# Patient Record
Sex: FEMALE | Race: WHITE | URBAN - METROPOLITAN AREA
[De-identification: names, ages, dates, MRNs, and addresses within clinical notes are randomized per-mention and may not be internally consistent; named-entity substitution may affect disease eponyms.]

---

## 2023-07-12 ENCOUNTER — INPATIENT (INPATIENT)
Facility: HOSPITAL | Age: 73
LOS: 7 days | End: 2023-07-20
Attending: INTERNAL MEDICINE | Admitting: INTERNAL MEDICINE
Payer: MEDICARE

## 2023-07-12 VITALS
SYSTOLIC BLOOD PRESSURE: 129 MMHG | RESPIRATION RATE: 22 BRPM | HEART RATE: 115 BPM | TEMPERATURE: 101 F | DIASTOLIC BLOOD PRESSURE: 60 MMHG | OXYGEN SATURATION: 97 %

## 2023-07-12 DIAGNOSIS — J96.01 ACUTE RESPIRATORY FAILURE WITH HYPOXIA: ICD-10-CM

## 2023-07-12 DIAGNOSIS — Z95.2 PRESENCE OF PROSTHETIC HEART VALVE: ICD-10-CM

## 2023-07-12 DIAGNOSIS — I10 ESSENTIAL (PRIMARY) HYPERTENSION: ICD-10-CM

## 2023-07-12 DIAGNOSIS — I50.20 UNSPECIFIED SYSTOLIC (CONGESTIVE) HEART FAILURE: ICD-10-CM

## 2023-07-12 DIAGNOSIS — J18.9 PNEUMONIA, UNSPECIFIED ORGANISM: ICD-10-CM

## 2023-07-12 DIAGNOSIS — G93.41 METABOLIC ENCEPHALOPATHY: ICD-10-CM

## 2023-07-12 DIAGNOSIS — E78.5 HYPERLIPIDEMIA, UNSPECIFIED: ICD-10-CM

## 2023-07-12 DIAGNOSIS — C34.90 MALIGNANT NEOPLASM OF UNSPECIFIED PART OF UNSPECIFIED BRONCHUS OR LUNG: ICD-10-CM

## 2023-07-12 DIAGNOSIS — Z29.9 ENCOUNTER FOR PROPHYLACTIC MEASURES, UNSPECIFIED: ICD-10-CM

## 2023-07-12 DIAGNOSIS — E11.9 TYPE 2 DIABETES MELLITUS WITHOUT COMPLICATIONS: ICD-10-CM

## 2023-07-12 DIAGNOSIS — I50.43 ACUTE ON CHRONIC COMBINED SYSTOLIC (CONGESTIVE) AND DIASTOLIC (CONGESTIVE) HEART FAILURE: ICD-10-CM

## 2023-07-12 DIAGNOSIS — A41.9 SEPSIS, UNSPECIFIED ORGANISM: ICD-10-CM

## 2023-07-12 LAB
ALBUMIN SERPL ELPH-MCNC: 2.8 G/DL — LOW (ref 3.3–5)
ALP SERPL-CCNC: 397 U/L — HIGH (ref 40–120)
ALT FLD-CCNC: 24 U/L — SIGNIFICANT CHANGE UP (ref 4–33)
ANION GAP SERPL CALC-SCNC: 17 MMOL/L — HIGH (ref 7–14)
APPEARANCE UR: ABNORMAL
AST SERPL-CCNC: 62 U/L — HIGH (ref 4–32)
B PERT DNA SPEC QL NAA+PROBE: SIGNIFICANT CHANGE UP
B PERT+PARAPERT DNA PNL SPEC NAA+PROBE: SIGNIFICANT CHANGE UP
BACTERIA # UR AUTO: NEGATIVE /HPF — SIGNIFICANT CHANGE UP
BASE EXCESS BLDV CALC-SCNC: -1.6 MMOL/L — SIGNIFICANT CHANGE UP (ref -2–3)
BASE EXCESS BLDV CALC-SCNC: -2.2 MMOL/L — LOW (ref -2–3)
BASE EXCESS BLDV CALC-SCNC: 0.6 MMOL/L — SIGNIFICANT CHANGE UP (ref -2–3)
BASOPHILS # BLD AUTO: 0.01 K/UL — SIGNIFICANT CHANGE UP (ref 0–0.2)
BASOPHILS NFR BLD AUTO: 0 % — SIGNIFICANT CHANGE UP (ref 0–2)
BILIRUB SERPL-MCNC: 1.4 MG/DL — HIGH (ref 0.2–1.2)
BILIRUB UR-MCNC: NEGATIVE — SIGNIFICANT CHANGE UP
BLOOD GAS VENOUS COMPREHENSIVE RESULT: SIGNIFICANT CHANGE UP
BORDETELLA PARAPERTUSSIS (RAPRVP): SIGNIFICANT CHANGE UP
BUN SERPL-MCNC: 26 MG/DL — HIGH (ref 7–23)
C PNEUM DNA SPEC QL NAA+PROBE: SIGNIFICANT CHANGE UP
CALCIUM SERPL-MCNC: 11.9 MG/DL — HIGH (ref 8.4–10.5)
CAST: 0 /LPF — SIGNIFICANT CHANGE UP (ref 0–4)
CHLORIDE BLDV-SCNC: 106 MMOL/L — SIGNIFICANT CHANGE UP (ref 96–108)
CHLORIDE BLDV-SCNC: 108 MMOL/L — SIGNIFICANT CHANGE UP (ref 96–108)
CHLORIDE BLDV-SCNC: 111 MMOL/L — HIGH (ref 96–108)
CHLORIDE SERPL-SCNC: 105 MMOL/L — SIGNIFICANT CHANGE UP (ref 98–107)
CK MB CFR SERPL CALC: 2.1 NG/ML — SIGNIFICANT CHANGE UP
CO2 BLDV-SCNC: 23.4 MMOL/L — SIGNIFICANT CHANGE UP (ref 22–26)
CO2 BLDV-SCNC: 24 MMOL/L — SIGNIFICANT CHANGE UP (ref 22–26)
CO2 BLDV-SCNC: 25.2 MMOL/L — SIGNIFICANT CHANGE UP (ref 22–26)
CO2 SERPL-SCNC: 22 MMOL/L — SIGNIFICANT CHANGE UP (ref 22–31)
COLOR SPEC: YELLOW — SIGNIFICANT CHANGE UP
CREAT SERPL-MCNC: 0.94 MG/DL — SIGNIFICANT CHANGE UP (ref 0.5–1.3)
DIFF PNL FLD: ABNORMAL
EGFR: 64 ML/MIN/1.73M2 — SIGNIFICANT CHANGE UP
EOSINOPHIL # BLD AUTO: 0 K/UL — SIGNIFICANT CHANGE UP (ref 0–0.5)
EOSINOPHIL NFR BLD AUTO: 0 % — SIGNIFICANT CHANGE UP (ref 0–6)
FLUAV SUBTYP SPEC NAA+PROBE: SIGNIFICANT CHANGE UP
FLUBV RNA SPEC QL NAA+PROBE: SIGNIFICANT CHANGE UP
GAS PNL BLDV: 139 MMOL/L — SIGNIFICANT CHANGE UP (ref 136–145)
GAS PNL BLDV: 140 MMOL/L — SIGNIFICANT CHANGE UP (ref 136–145)
GAS PNL BLDV: 141 MMOL/L — SIGNIFICANT CHANGE UP (ref 136–145)
GAS PNL BLDV: SIGNIFICANT CHANGE UP
GAS PNL BLDV: SIGNIFICANT CHANGE UP
GLUCOSE BLDV-MCNC: 154 MG/DL — HIGH (ref 70–99)
GLUCOSE BLDV-MCNC: 157 MG/DL — HIGH (ref 70–99)
GLUCOSE BLDV-MCNC: 158 MG/DL — HIGH (ref 70–99)
GLUCOSE SERPL-MCNC: 165 MG/DL — HIGH (ref 70–99)
GLUCOSE UR QL: NEGATIVE MG/DL — SIGNIFICANT CHANGE UP
HADV DNA SPEC QL NAA+PROBE: SIGNIFICANT CHANGE UP
HCO3 BLDV-SCNC: 22 MMOL/L — SIGNIFICANT CHANGE UP (ref 22–29)
HCO3 BLDV-SCNC: 23 MMOL/L — SIGNIFICANT CHANGE UP (ref 22–29)
HCO3 BLDV-SCNC: 24 MMOL/L — SIGNIFICANT CHANGE UP (ref 22–29)
HCOV 229E RNA SPEC QL NAA+PROBE: SIGNIFICANT CHANGE UP
HCOV HKU1 RNA SPEC QL NAA+PROBE: SIGNIFICANT CHANGE UP
HCOV NL63 RNA SPEC QL NAA+PROBE: SIGNIFICANT CHANGE UP
HCOV OC43 RNA SPEC QL NAA+PROBE: SIGNIFICANT CHANGE UP
HCT VFR BLD CALC: 29.6 % — LOW (ref 34.5–45)
HCT VFR BLDA CALC: 28 % — LOW (ref 34.5–46.5)
HCT VFR BLDA CALC: 30 % — LOW (ref 34.5–46.5)
HCT VFR BLDA CALC: 31 % — LOW (ref 34.5–46.5)
HGB BLD CALC-MCNC: 10.2 G/DL — LOW (ref 11.7–16.1)
HGB BLD CALC-MCNC: 9.2 G/DL — LOW (ref 11.7–16.1)
HGB BLD CALC-MCNC: 9.9 G/DL — LOW (ref 11.7–16.1)
HGB BLD-MCNC: 9.4 G/DL — LOW (ref 11.5–15.5)
HMPV RNA SPEC QL NAA+PROBE: SIGNIFICANT CHANGE UP
HPIV1 RNA SPEC QL NAA+PROBE: SIGNIFICANT CHANGE UP
HPIV2 RNA SPEC QL NAA+PROBE: SIGNIFICANT CHANGE UP
HPIV3 RNA SPEC QL NAA+PROBE: SIGNIFICANT CHANGE UP
HPIV4 RNA SPEC QL NAA+PROBE: SIGNIFICANT CHANGE UP
IANC: 17.87 K/UL — HIGH (ref 1.8–7.4)
IMM GRANULOCYTES NFR BLD AUTO: 1.1 % — HIGH (ref 0–0.9)
KETONES UR-MCNC: NEGATIVE MG/DL — SIGNIFICANT CHANGE UP
LACTATE BLDV-MCNC: 2.8 MMOL/L — HIGH (ref 0.5–2)
LACTATE BLDV-MCNC: 3.6 MMOL/L — HIGH (ref 0.5–2)
LACTATE BLDV-MCNC: 4.2 MMOL/L — CRITICAL HIGH (ref 0.5–2)
LEUKOCYTE ESTERASE UR-ACNC: NEGATIVE — SIGNIFICANT CHANGE UP
LYMPHOCYTES # BLD AUTO: 0.77 K/UL — LOW (ref 1–3.3)
LYMPHOCYTES # BLD AUTO: 3.8 % — LOW (ref 13–44)
M PNEUMO DNA SPEC QL NAA+PROBE: SIGNIFICANT CHANGE UP
MCHC RBC-ENTMCNC: 25.5 PG — LOW (ref 27–34)
MCHC RBC-ENTMCNC: 31.8 GM/DL — LOW (ref 32–36)
MCV RBC AUTO: 80.2 FL — SIGNIFICANT CHANGE UP (ref 80–100)
MONOCYTES # BLD AUTO: 1.18 K/UL — HIGH (ref 0–0.9)
MONOCYTES NFR BLD AUTO: 5.9 % — SIGNIFICANT CHANGE UP (ref 2–14)
NEUTROPHILS # BLD AUTO: 17.87 K/UL — HIGH (ref 1.8–7.4)
NEUTROPHILS NFR BLD AUTO: 89.2 % — HIGH (ref 43–77)
NITRITE UR-MCNC: NEGATIVE — SIGNIFICANT CHANGE UP
NRBC # BLD: 0 /100 WBCS — SIGNIFICANT CHANGE UP (ref 0–0)
NRBC # FLD: 0 K/UL — SIGNIFICANT CHANGE UP (ref 0–0)
PCO2 BLDV: 34 MMHG — LOW (ref 39–52)
PCO2 BLDV: 36 MMHG — LOW (ref 39–52)
PCO2 BLDV: 37 MMHG — LOW (ref 39–52)
PH BLDV: 7.4 — SIGNIFICANT CHANGE UP (ref 7.32–7.43)
PH BLDV: 7.4 — SIGNIFICANT CHANGE UP (ref 7.32–7.43)
PH BLDV: 7.46 — HIGH (ref 7.32–7.43)
PH UR: 5.5 — SIGNIFICANT CHANGE UP (ref 5–8)
PLATELET # BLD AUTO: 377 K/UL — SIGNIFICANT CHANGE UP (ref 150–400)
PO2 BLDV: 68 MMHG — HIGH (ref 25–45)
PO2 BLDV: 78 MMHG — HIGH (ref 25–45)
PO2 BLDV: 80 MMHG — HIGH (ref 25–45)
POTASSIUM BLDV-SCNC: 2.6 MMOL/L — CRITICAL LOW (ref 3.5–5.1)
POTASSIUM BLDV-SCNC: 2.9 MMOL/L — CRITICAL LOW (ref 3.5–5.1)
POTASSIUM BLDV-SCNC: 3.1 MMOL/L — LOW (ref 3.5–5.1)
POTASSIUM SERPL-MCNC: 3.1 MMOL/L — LOW (ref 3.5–5.3)
POTASSIUM SERPL-SCNC: 3.1 MMOL/L — LOW (ref 3.5–5.3)
PROT SERPL-MCNC: 5.8 G/DL — LOW (ref 6–8.3)
PROT UR-MCNC: 30 MG/DL
RAPID RVP RESULT: SIGNIFICANT CHANGE UP
RBC # BLD: 3.69 M/UL — LOW (ref 3.8–5.2)
RBC # FLD: 15.8 % — HIGH (ref 10.3–14.5)
RBC CASTS # UR COMP ASSIST: 2 /HPF — SIGNIFICANT CHANGE UP (ref 0–4)
RSV RNA SPEC QL NAA+PROBE: SIGNIFICANT CHANGE UP
RV+EV RNA SPEC QL NAA+PROBE: SIGNIFICANT CHANGE UP
SAO2 % BLDV: 94 % — HIGH (ref 67–88)
SAO2 % BLDV: 97.1 % — HIGH (ref 67–88)
SAO2 % BLDV: 97.3 % — HIGH (ref 67–88)
SARS-COV-2 RNA SPEC QL NAA+PROBE: SIGNIFICANT CHANGE UP
SODIUM SERPL-SCNC: 144 MMOL/L — SIGNIFICANT CHANGE UP (ref 135–145)
SP GR SPEC: 1.03 — SIGNIFICANT CHANGE UP (ref 1–1.03)
SQUAMOUS # UR AUTO: 7 /HPF — HIGH (ref 0–5)
TROPONIN T, HIGH SENSITIVITY RESULT: 83 NG/L — CRITICAL HIGH
TROPONIN T, HIGH SENSITIVITY RESULT: 89 NG/L — CRITICAL HIGH
UROBILINOGEN FLD QL: 0.2 MG/DL — SIGNIFICANT CHANGE UP (ref 0.2–1)
WBC # BLD: 20.05 K/UL — HIGH (ref 3.8–10.5)
WBC # FLD AUTO: 20.05 K/UL — HIGH (ref 3.8–10.5)
WBC UR QL: 11 /HPF — HIGH (ref 0–5)

## 2023-07-12 PROCEDURE — 71045 X-RAY EXAM CHEST 1 VIEW: CPT | Mod: 26

## 2023-07-12 PROCEDURE — 74177 CT ABD & PELVIS W/CONTRAST: CPT | Mod: 26,MA

## 2023-07-12 PROCEDURE — 71275 CT ANGIOGRAPHY CHEST: CPT | Mod: 26,MA

## 2023-07-12 PROCEDURE — 99285 EMERGENCY DEPT VISIT HI MDM: CPT

## 2023-07-12 RX ORDER — DEXTROSE 50 % IN WATER 50 %
15 SYRINGE (ML) INTRAVENOUS ONCE
Refills: 0 | Status: DISCONTINUED | OUTPATIENT
Start: 2023-07-12 | End: 2023-07-14

## 2023-07-12 RX ORDER — ENOXAPARIN SODIUM 100 MG/ML
40 INJECTION SUBCUTANEOUS EVERY 24 HOURS
Refills: 0 | Status: DISCONTINUED | OUTPATIENT
Start: 2023-07-12 | End: 2023-07-12

## 2023-07-12 RX ORDER — METOPROLOL TARTRATE 50 MG
100 TABLET ORAL DAILY
Refills: 0 | Status: DISCONTINUED | OUTPATIENT
Start: 2023-07-12 | End: 2023-07-14

## 2023-07-12 RX ORDER — POLYETHYLENE GLYCOL 3350 17 G/17G
17 POWDER, FOR SOLUTION ORAL DAILY
Refills: 0 | Status: DISCONTINUED | OUTPATIENT
Start: 2023-07-12 | End: 2023-07-20

## 2023-07-12 RX ORDER — PIPERACILLIN AND TAZOBACTAM 4; .5 G/20ML; G/20ML
3.38 INJECTION, POWDER, LYOPHILIZED, FOR SOLUTION INTRAVENOUS ONCE
Refills: 0 | Status: COMPLETED | OUTPATIENT
Start: 2023-07-12 | End: 2023-07-12

## 2023-07-12 RX ORDER — SODIUM CHLORIDE 9 MG/ML
1000 INJECTION, SOLUTION INTRAVENOUS
Refills: 0 | Status: DISCONTINUED | OUTPATIENT
Start: 2023-07-12 | End: 2023-07-14

## 2023-07-12 RX ORDER — SODIUM CHLORIDE 9 MG/ML
500 INJECTION, SOLUTION INTRAVENOUS ONCE
Refills: 0 | Status: COMPLETED | OUTPATIENT
Start: 2023-07-12 | End: 2023-07-12

## 2023-07-12 RX ORDER — DAPAGLIFLOZIN 10 MG/1
10 TABLET, FILM COATED ORAL EVERY 24 HOURS
Refills: 0 | Status: DISCONTINUED | OUTPATIENT
Start: 2023-07-12 | End: 2023-07-13

## 2023-07-12 RX ORDER — SODIUM CHLORIDE 9 MG/ML
1000 INJECTION INTRAMUSCULAR; INTRAVENOUS; SUBCUTANEOUS ONCE
Refills: 0 | Status: COMPLETED | OUTPATIENT
Start: 2023-07-12 | End: 2023-07-12

## 2023-07-12 RX ORDER — LEVOTHYROXINE SODIUM 125 MCG
25 TABLET ORAL DAILY
Refills: 0 | Status: DISCONTINUED | OUTPATIENT
Start: 2023-07-12 | End: 2023-07-14

## 2023-07-12 RX ORDER — DEXTROSE 50 % IN WATER 50 %
25 SYRINGE (ML) INTRAVENOUS ONCE
Refills: 0 | Status: DISCONTINUED | OUTPATIENT
Start: 2023-07-12 | End: 2023-07-14

## 2023-07-12 RX ORDER — SODIUM CHLORIDE 9 MG/ML
4 INJECTION INTRAMUSCULAR; INTRAVENOUS; SUBCUTANEOUS EVERY 12 HOURS
Refills: 0 | Status: DISCONTINUED | OUTPATIENT
Start: 2023-07-12 | End: 2023-07-13

## 2023-07-12 RX ORDER — SACUBITRIL AND VALSARTAN 24; 26 MG/1; MG/1
1 TABLET, FILM COATED ORAL
Refills: 0 | Status: DISCONTINUED | OUTPATIENT
Start: 2023-07-12 | End: 2023-07-14

## 2023-07-12 RX ORDER — INSULIN LISPRO 100/ML
VIAL (ML) SUBCUTANEOUS
Refills: 0 | Status: DISCONTINUED | OUTPATIENT
Start: 2023-07-12 | End: 2023-07-14

## 2023-07-12 RX ORDER — POTASSIUM CHLORIDE 20 MEQ
10 PACKET (EA) ORAL
Refills: 0 | Status: COMPLETED | OUTPATIENT
Start: 2023-07-12 | End: 2023-07-12

## 2023-07-12 RX ORDER — IPRATROPIUM/ALBUTEROL SULFATE 18-103MCG
3 AEROSOL WITH ADAPTER (GRAM) INHALATION EVERY 6 HOURS
Refills: 0 | Status: DISCONTINUED | OUTPATIENT
Start: 2023-07-12 | End: 2023-07-20

## 2023-07-12 RX ORDER — PIPERACILLIN AND TAZOBACTAM 4; .5 G/20ML; G/20ML
3.38 INJECTION, POWDER, LYOPHILIZED, FOR SOLUTION INTRAVENOUS EVERY 8 HOURS
Refills: 0 | Status: COMPLETED | OUTPATIENT
Start: 2023-07-12 | End: 2023-07-19

## 2023-07-12 RX ORDER — GLUCAGON INJECTION, SOLUTION 0.5 MG/.1ML
1 INJECTION, SOLUTION SUBCUTANEOUS ONCE
Refills: 0 | Status: DISCONTINUED | OUTPATIENT
Start: 2023-07-12 | End: 2023-07-14

## 2023-07-12 RX ORDER — POTASSIUM CHLORIDE 20 MEQ
40 PACKET (EA) ORAL EVERY 4 HOURS
Refills: 0 | Status: COMPLETED | OUTPATIENT
Start: 2023-07-12 | End: 2023-07-12

## 2023-07-12 RX ORDER — PANTOPRAZOLE SODIUM 20 MG/1
40 TABLET, DELAYED RELEASE ORAL
Refills: 0 | Status: DISCONTINUED | OUTPATIENT
Start: 2023-07-12 | End: 2023-07-14

## 2023-07-12 RX ORDER — ACETAMINOPHEN 500 MG
1000 TABLET ORAL ONCE
Refills: 0 | Status: COMPLETED | OUTPATIENT
Start: 2023-07-12 | End: 2023-07-12

## 2023-07-12 RX ORDER — VANCOMYCIN HCL 1 G
1000 VIAL (EA) INTRAVENOUS ONCE
Refills: 0 | Status: COMPLETED | OUTPATIENT
Start: 2023-07-12 | End: 2023-07-12

## 2023-07-12 RX ORDER — ATORVASTATIN CALCIUM 80 MG/1
80 TABLET, FILM COATED ORAL AT BEDTIME
Refills: 0 | Status: DISCONTINUED | OUTPATIENT
Start: 2023-07-12 | End: 2023-07-14

## 2023-07-12 RX ORDER — ACETAMINOPHEN 500 MG
650 TABLET ORAL EVERY 6 HOURS
Refills: 0 | Status: DISCONTINUED | OUTPATIENT
Start: 2023-07-12 | End: 2023-07-20

## 2023-07-12 RX ORDER — DEXTROSE 50 % IN WATER 50 %
12.5 SYRINGE (ML) INTRAVENOUS ONCE
Refills: 0 | Status: DISCONTINUED | OUTPATIENT
Start: 2023-07-12 | End: 2023-07-14

## 2023-07-12 RX ORDER — INSULIN LISPRO 100/ML
VIAL (ML) SUBCUTANEOUS AT BEDTIME
Refills: 0 | Status: DISCONTINUED | OUTPATIENT
Start: 2023-07-13 | End: 2023-07-14

## 2023-07-12 RX ADMIN — Medication 250 MILLIGRAM(S): at 19:40

## 2023-07-12 RX ADMIN — SODIUM CHLORIDE 500 MILLILITER(S): 9 INJECTION, SOLUTION INTRAVENOUS at 20:50

## 2023-07-12 RX ADMIN — PIPERACILLIN AND TAZOBACTAM 200 GRAM(S): 4; .5 INJECTION, POWDER, LYOPHILIZED, FOR SOLUTION INTRAVENOUS at 17:02

## 2023-07-12 RX ADMIN — SODIUM CHLORIDE 1000 MILLILITER(S): 9 INJECTION INTRAMUSCULAR; INTRAVENOUS; SUBCUTANEOUS at 15:58

## 2023-07-12 RX ADMIN — Medication 400 MILLIGRAM(S): at 17:01

## 2023-07-12 RX ADMIN — Medication 40 MILLIEQUIVALENT(S): at 20:43

## 2023-07-12 RX ADMIN — Medication 100 MILLIEQUIVALENT(S): at 20:45

## 2023-07-12 RX ADMIN — SODIUM CHLORIDE 1000 MILLILITER(S): 9 INJECTION INTRAMUSCULAR; INTRAVENOUS; SUBCUTANEOUS at 17:01

## 2023-07-12 RX ADMIN — Medication 100 MILLIEQUIVALENT(S): at 23:05

## 2023-07-12 RX ADMIN — PIPERACILLIN AND TAZOBACTAM 3.38 GRAM(S): 4; .5 INJECTION, POWDER, LYOPHILIZED, FOR SOLUTION INTRAVENOUS at 17:00

## 2023-07-12 RX ADMIN — Medication 40 MILLIEQUIVALENT(S): at 23:52

## 2023-07-12 RX ADMIN — Medication 1000 MILLIGRAM(S): at 17:01

## 2023-07-12 NOTE — H&P ADULT - HISTORY OF PRESENT ILLNESS
Patient is a 73 year old F with R sided lung cancer with metastasis to lung and liver, T2DM, COPD, HTN, hypothyroidism, HLD, and depression who presents from [place] for hypoxia and respiratory distress. Per NH staff, the patient began having sudden onset respiratory distress around 11 AM prior to arrival and was found to be hypoxic to the 80s at the time. The patient was placed on nasal cannula by EMS with improvement. The patient endorses shortness of breath,    In the ED, vitals notable for , TMax 38.4C, /60, SpO2 97% on 6L NC. Labs remarkable for WBC 20, hgb 9.4, hsTropT 89 --> 83, K 3.1 and mild LFT elevation. CT C/A/P without PE, but revealing large right hilar mass obstructing the right mainstem bronchus, along with bilateral pulmonary, judy and hepatic metastases. The patient received 2.5L IVF, vanc/zosyn, and IV tylenol. Patient is a 73 year old F with R sided lung cancer with metastasis to lung and liver, mechanical aortic valve on warfarin, ?HFrEF, T2DM, COPD, HTN, hypothyroidism, HLD, and depression who presents from [place] for hypoxia and respiratory distress. Per NH staff, the patient began having sudden onset respiratory distress around 11 AM prior to arrival and was found to be hypoxic to the 80s at the time. The patient was placed on nasal cannula by EMS with improvement. The patient cannot participate in the history given her altered mental status.    In the ED, vitals notable for , TMax 38.4C, /60, SpO2 97% on 6L NC. Labs remarkable for WBC 20, hgb 9.4, hsTropT 89 --> 83, K 3.1 and mild LFT elevation. CT C/A/P without PE, but revealing large right hilar mass obstructing the right mainstem bronchus, along with bilateral pulmonary, judy and hepatic metastases. The patient received 2.5L IVF, vanc/zosyn, and IV tylenol.

## 2023-07-12 NOTE — ED ADULT TRIAGE NOTE - CHIEF COMPLAINT QUOTE
Pt reporting to the ED [facility] as a notification for AMS and respiratory distress. Pt brought directly back to [facility]

## 2023-07-12 NOTE — H&P ADULT - PROBLEM SELECTOR PLAN 10
DVT PPx: home warfarin  Diet:   Code:   Dispo:   Communication:    Discharge information:  Pharmacy:  PCP: EKG with sinus tach + PVCs, prolonged QTc 524  - QTc falsely prolonged in the setting of sinus tachycardia  - recheck when HR returns to normal rate

## 2023-07-12 NOTE — H&P ADULT - PROBLEM SELECTOR PLAN 8
Patient on eztemibe and rosuvastatin at home    Plan:  > continue with atorvastatin while admitted Patient on Ezetimibe and rosuvastatin at home    Plan:  > continue with atorvastatin while admitted

## 2023-07-12 NOTE — ED PROVIDER NOTE - ATTENDING CONTRIBUTION TO CARE
73-year-old female past medical history of lung cancer with right-sided lung mass and metastases to liver, bilateral pneumonia, type 2 diabetes, hypertension, hyperlipidemia, hypothyroidism, COPD, depression brought in by EMS after [facility] with acute onset respiratory distress and lethargy.  Around 11 AM today patient was noted to have respiratory distress O2 sats at 80%, placed on nasal cannula at that time.  Patient is hot to touch, following commands, moving all extremities, decreased breath sounds on the right with visible soft tissue mass noted over right clavicle.  Heart tachycardic with regular rhythm, abdomen with tenderness to palpation.  Sepsis labs, CTA chest, CT abdomen, RVP, TBA.  Patient was treated with fluids will also treat with broad-spectrum antibiotics. Differential includes sepsis 2/2 pneumonia, obstruction secondary to mass, PE, pneumothorax, versus other infectious etiology.  Bedside ultrasound reveals lung sliding on the right chest, chest x-ray does not reveal pneumothorax.

## 2023-07-12 NOTE — ED ADULT NURSE NOTE - NSFALLUNIVINTERV_ED_ALL_ED
Bed/Stretcher in lowest position, wheels locked, appropriate side rails in place/Call bell, personal items and telephone in reach/Instruct patient to call for assistance before getting out of bed/chair/stretcher/Non-slip footwear applied when patient is off stretcher/[place] to call system/Physically safe environment - no spills, clutter or unnecessary equipment/Purposeful proactive rounding/Room/bathroom lighting operational, light cord in reach

## 2023-07-12 NOTE — H&P ADULT - PROBLEM SELECTOR PLAN 1
Patient with tachycardia, tachypnea, leukocytosis in the setting of likely post obstructive PNA  - s/p vanc and zosyn in the ED  - s/p 2.5 L IVF   - patient with recent admission at [facility] for AHRF due to post obstructive PNA, tx with IV levaquin  - likely obstructive PNA from large R hilar mass obstructing the mainstem bronchus  - per review of [facility] records, patient underwent bronchoscopy with biopsy and endobronchial stent placement in 6/2023    Plan:  > continue with IV zosyn, anticipate 7 day course pending intervention (7/12 - )  > follow up UA, UCx, blood cx, urine legionella Patient with tachycardia, tachypnea, leukocytosis in the setting of likely post obstructive PNA  - s/p vanc and zosyn in the ED  - s/p 2.5 L IVF   - patient with recent admission at [facility] for AHRF due to post obstructive PNA, tx with IV levaquin  - likely obstructive PNA from large R hilar mass obstructing the mainstem bronchus  - per review of [facility] records, patient underwent bronchoscopy with biopsy and endobronchial stent placement in 6/2023    Plan:  > continue with IV zosyn, anticipate 7 day course pending intervention (7/12 - )  > ID consult in AM  > follow up UA, UCx, blood cx, urine legionella, MRSA PCR Patient with tachycardia, tachypnea, leukocytosis, fever, in the setting of likely post obstructive PNA  - s/p vanc and zosyn in the ED  - s/p 2.5 L IVF   - patient with recent admission at [facility] for AHRF due to post obstructive PNA, tx with IV levaquin  - likely obstructive PNA from large R hilar mass obstructing the mainstem bronchus  - per review of [facility] records, patient underwent bronchoscopy with biopsy and endobronchial stent placement in 6/2023    Plan:  > continue with IV zosyn, anticipate 7 day course pending intervention (7/12 - )  > ID consult in AM  > follow up UA, UCx, blood cx, urine legionella, MRSA PCR  > ensure optimal hydration  > Tylenol PRN fever, mild pain

## 2023-07-12 NOTE — H&P ADULT - PROBLEM SELECTOR PLAN 2
Likely in the setting of post-obstructive PNA and worsening lung cancer  - found to be hypxoxic  - now stable on 6L NC, SpO2 94-96%    Plan:  > abx as above  > aggressive pulmonary toilet: chest PT, duoneb, hypersal  > pulm consult in AM; patient may benefit from further stenting or re-evaluation of obstructive lesion Likely in the setting of post-obstructive PNA and worsening lung cancer  - found to be hypxoxic  - now stable on 6L NC, SpO2 94-96%    Plan:  > abx as above  > aggressive pulmonary toilet: chest PT, duoneb, hypersal  > pulm consult in AM; patient may benefit from further stenting Likely in the setting of post-obstructive PNA and worsening lung cancer  - found to be hypxoxic  - now stable on 6L NC, SpO2 94-96%    Plan:  > abx as above  > aggressive pulmonary toilet: chest PT, duo-neb, hypersal  > HOB elevation  > aspiration precautions  > pulm consult in AM; patient may benefit from further stenting

## 2023-07-12 NOTE — H&P ADULT - PROBLEM SELECTOR PLAN 11
DVT PPx: home warfarin  Diet: pending bedside dysphagia  Code: FULL code per last hosp at [facility] 6/2023  Dispo: pending clinical improvement

## 2023-07-12 NOTE — ED PROVIDER NOTE - PHYSICAL EXAMINATION
Jennie Talbot MD  GENERAL: Patient +tired, pale.   HEENT: NC/AT, Moist mucous membranes, EOMI.  LUNGS: increased WOB, tachypneic, decreased breath sound   CARDIAC: RRR, no m/r/g.    ABDOMEN: Soft, +generally tender, ND, No rebound, guarding.   EXT: No edema. No calf tenderness.   MSK: No pain with movement, no deformities.  NEURO: A&Ox3. Moving all extremities.  SKIN: Warm and dry. No rash.  PSYCH: Normal affect.

## 2023-07-12 NOTE — ED ADULT NURSE NOTE - OBJECTIVE STATEMENT
chest pain SARITA RN: pt. received to [facility] as a notification for respiratory distress from [facility]. Pt. A&Ox4 at baseline. As per EMS, pt. was fine this AM until approx. 1100, when they noticed her Oxygen started to drop (80s on RA). Upon arrival, pt. noted to be on 6L supplemental O2 via NC. Pt. lethargic but responsive to painful stimuli. breath sounds noted to be absent on the R side, L side lung sounds present. Trachea noted to not be deviated at this time. No crepitus noted. Skin intact, no signs of skin breakdown noted. Sinus tach noted on bedside cardiac monitor. pt. arrives with a 22g IV in the L FA, removed upon arrival. 18g IV placed in the R Wrist, 20g IV placed in the L Hand. Labs drawn and sent as per orders. pt. cleaned, changed and repositioned, purewick applied. comfort measures provided. safety precautions maintained. report endorsed to Primary RN Chuy.

## 2023-07-12 NOTE — H&P ADULT - NSICDXNOFAMILYHX_GEN_ALL_CORE
Called patient regarding missed f/u CT chest. States she was unaware she had appointment but would like to proceed with rescheduling. Nurse navigator called CS and obtained new appointment for 01/05/20 at 2pm at [facility]. Notified patient of this appointment date, time and location. She v/u and agreeable to plan. She knows to call with questions or concerns.    <-- Click to add NO pertinent Family History

## 2023-07-12 NOTE — ED ADULT NURSE REASSESSMENT NOTE - NS ED NURSE REASSESS COMMENT FT1
Facilitator RN: pt sating 88-92% on pulse oximetry on 6L, pt comfortably appearing, in no apparent distress. Spoke to ACP, ok for patient to remains at 6L, request patient to be transported on continuous pulse ox
Pt oxygen saturation was ranging from 95-96% on 6L nasal cannula and HR ranging from . MD Cruz spoken to at bedside. Ordered that pt able to transfer to floor without continuous pulse ox. Pt transferred upstairs not in any distress or discomfort. Pt safety maintained.
Received report from Day RN, pt is A&O x 2-3. Pt is on 6L of oxygen on nasal cannula. Pt is lying in bed comfortably. Pt started on K runs. pt safety maintained.
Pt resting comfortably in bed, in no acute distress. Pt pending admission bed assignment, will continue to monitor.

## 2023-07-12 NOTE — H&P ADULT - NSHPREVIEWOFSYSTEMS_GEN_ALL_CORE
REVIEW OF SYSTEMS:    CONSTITUTIONAL: +weakness; + lethargy; fevers or chills  EYES/ENT: No visual changes;  No vertigo or throat pain   NECK: No pain or stiffness  RESPIRATORY: No cough, wheezing, hemoptysis; No shortness of breath  CARDIOVASCULAR: No chest pain or palpitations  GASTROINTESTINAL: No abdominal or epigastric pain. No nausea, vomiting, or hematemesis; No diarrhea or constipation. No melena or hematochezia.  GENITOURINARY: No dysuria, frequency or hematuria  NEUROLOGICAL: No numbness or weakness  SKIN: No itching, rashes REVIEW OF SYSTEMS:    CONSTITUTIONAL: +weakness; + lethargy; fevers or chills  EYES/ENT: No visual changes;  No vertigo or throat pain   NECK: No pain or stiffness  RESPIRATORY: +shortness of breath; No cough, wheezing, hemoptysis; No shortness of breath  CARDIOVASCULAR: No chest pain or palpitations  GASTROINTESTINAL: No abdominal or epigastric pain. No nausea, vomiting, or hematemesis; No diarrhea or constipation. No melena or hematochezia.  GENITOURINARY: No dysuria, frequency or hematuria  NEUROLOGICAL: No numbness or weakness  SKIN: No itching, rashes

## 2023-07-12 NOTE — ED PROVIDER NOTE - PROGRESS NOTE DETAILS
pt found to be febrile, abx and tylenol and fluids at this time for sepsis.  trop 80s likely 2/2 cardiac demand

## 2023-07-12 NOTE — H&P ADULT - NSHPSOCIALHISTORY_GEN_ALL_CORE
Lives at [facility] Former smoker, quit 5 months ago. Former smoker, quit 5 months ago, previously 25 pack year

## 2023-07-12 NOTE — H&P ADULT - PROBLEM SELECTOR PLAN 5
Patient without documented history of HFrEF; however on GDMT per outpt medication review  - on farxiga, entresto, vericiguat, metoprolol at home    Plan:  > continue with home meds, kidney function stable  > repeat TTE given unclear baseline and diagnosis Patient without documented history of HFrEF; however on GDMT per Outpt medication review  - on Farxiga, Entresto, vericiguat, metoprolol at home    Plan:  > continue with home meds, kidney function stable  > repeat TTE given unclear baseline and diagnosis

## 2023-07-12 NOTE — H&P ADULT - NSHPLABSRESULTS_GEN_ALL_CORE
LABS:                          9.4    20.05 )-----------( 377      ( 12 Jul 2023 14:30 )             29.6     07-12    144  |  105  |  26<H>  ----------------------------<  165<H>  3.1<L>   |  22  |  0.94    Ca    11.9<H>      12 Jul 2023 14:30    TPro  5.8<L>  /  Alb  2.8<L>  /  TBili  1.4<H>  /  DBili  x   /  AST  62<H>  /  ALT  24  /  AlkPhos  397<H>  07-12      < from: CT Abdomen and Pelvis w/ IV Cont (07.12.23 @ 17:31) >    IMPRESSION:  No pulmonary embolism to the segmental level.    Findings suspicious for metastatic lung cancer. Large right hilar mass   obstructing the right mainstem bronchus, as further described above, with   bilateral pulmonary, judy and hepatic metastases. Indeterminate   bilateral adrenal thickening.    Heterogeneous multinodular thyroid.    < end of copied text >    EKG: LABS:                          9.4    20.05 )-----------( 377      ( 12 Jul 2023 14:30 )             29.6     07-12    144  |  105  |  26<H>  ----------------------------<  165<H>  3.1<L>   |  22  |  0.94    Ca    11.9<H>      12 Jul 2023 14:30    TPro  5.8<L>  /  Alb  2.8<L>  /  TBili  1.4<H>  /  DBili  x   /  AST  62<H>  /  ALT  24  /  AlkPhos  397<H>  07-12      < from: CT Abdomen and Pelvis w/ IV Cont (07.12.23 @ 17:31) >    IMPRESSION:  No pulmonary embolism to the segmental level.    Findings suspicious for metastatic lung cancer. Large right hilar mass   obstructing the right mainstem bronchus, as further described above, with   bilateral pulmonary, judy and hepatic metastases. Indeterminate   bilateral adrenal thickening.    Heterogeneous multinodular thyroid.    < end of copied text >    EKG: sinus tachycardia with PVCs LABS:                          9.4    20.05 )-----------( 377      ( 12 Jul 2023 14:30 )             29.6     07-12    144  |  105  |  26<H>  ----------------------------<  165<H>  3.1<L>   |  22  |  0.94    Ca    11.9<H>      12 Jul 2023 14:30    TPro  5.8<L>  /  Alb  2.8<L>  /  TBili  1.4<H>  /  DBili  x   /  AST  62<H>  /  ALT  24  /  AlkPhos  397<H>  07-12      < from: CT Abdomen and Pelvis w/ IV Cont (07.12.23 @ 17:31) >    IMPRESSION:  No pulmonary embolism to the segmental level.    Findings suspicious for metastatic lung cancer. Large right hilar mass   obstructing the right mainstem bronchus, as further described above, with   bilateral pulmonary, judy and hepatic metastases. Indeterminate   bilateral adrenal thickening.    Heterogeneous multinodular thyroid.    < end of copied text >    EKG: sinus tachycardia () with PVCs, RBBB, QTc 524 LABS:                          9.4    20.05 )-----------( 377      ( 12 Jul 2023 14:30 )             29.6     07-12    144  |  105  |  26<H>  ----------------------------<  165<H>  3.1<L>   |  22  |  0.94    Ca    11.9<H>      12 Jul 2023 14:30    TPro  5.8<L>  /  Alb  2.8<L>  /  TBili  1.4<H>  /  DBili  x   /  AST  62<H>  /  ALT  24  /  AlkPhos  397<H>  07-12      < from: CT Abdomen and Pelvis w/ IV Cont (07.12.23 @ 17:31) >    IMPRESSION:  No pulmonary embolism to the segmental level.    Findings suspicious for metastatic lung cancer. Large right hilar mass   obstructing the right mainstem bronchus, as further described above, with   bilateral pulmonary, judy and hepatic metastases. Indeterminate   bilateral adrenal thickening.    Heterogeneous multinodular thyroid.    < end of copied text >      EKG: sinus tachycardia () with PVCs, RBBB, QTc 524

## 2023-07-12 NOTE — ED PROVIDER NOTE - OBJECTIVE STATEMENT
pt is a 72 yo F with lung cancer (R sided mass) with mets to liver, bilat pna, DM2, COPD, hypothyroidism, HTN, HLD, depression who presents to the ED for resp distress. Patient is presenting from [facility].  Per staff there, patient was doing well until about 11 AM to noon when she started having respiratory distress.  Her oxygenation saturation dropped down to 80s.  EMS placed her on nasal cannula.  Patient endorses shortness of breath and feeling warm but denies other symptoms.  At baseline, patient is fully alert and oriented.  At this time, patient is lethargic but still answering questions appropriately

## 2023-07-12 NOTE — H&P ADULT - PROBLEM SELECTOR PLAN 3
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

## 2023-07-12 NOTE — ED PROVIDER NOTE - CLINICAL SUMMARY MEDICAL DECISION MAKING FREE TEXT BOX
Antione - pt is a 72 yo F with lung cancer (R sided mass) with mets to liver, bilat pna, DM2, COPD, hypothyroidism, HTN, HLD, depression who presents to the ED for resp distress. ddx includes but is not limited to pna, worsening cancer, pneumothorax, PE. will check labs, cxr, CTA chest, CTA abd.

## 2023-07-12 NOTE — H&P ADULT - ASSESSMENT
Patient is a 73 year old F with metastatic R sided lung cancer, T2DM, COPD, HTN, hypothyroidism, HLD, and depression who presents from [place] for respiratory distress and hypoxia, found to have sepsis and acute hypoxemic respiratory failure likely 2/2 from post-obstructive pnuemonia [ x ]  Lab studies personally reviewed  [ x ]  Radiology personally reviewed  [ x ]  Old records personally reviewed; including transfer documentation from outside facility    Patient is a 73 year old F with metastatic R sided lung cancer, T2DM, COPD, HTN, hypothyroidism, HLD, and depression who presents from [place] for respiratory distress and hypoxia, found to have sepsis and acute hypoxemic respiratory failure likely 2/2 from post-obstructive pneumonia.

## 2023-07-12 NOTE — H&P ADULT - ATTENDING COMMENTS
73 year old female, with past history as noted above, being treated chiefly for sepsis due to pneumonia, Acute respiratory failure with hypoxia; complicated by lung cancer with metastases.  Tachycardic, tachypneic, febrile and with leukocytosis.  RVP/COVID-19 PCR negative.  Received zosyn and vancomycin in the ED; continuing with zosyn.  IVF hydration.  F/up cultures.  On supplemental oxygen via nasal cannula; currently on 6L NC.  Electrolytes being supplemented (hypokalemic to 3.1 on initial CMP).  Ensure optimal hydration.  HOB elevation, aspiration precautions.  Had endobronchial stent placed in 6/2023.  Pulmonology consult in the AM.   Would also benefit from ID consult in the AM.    QTc = 524, with frequent PVCs and heart rate at 117 bpm; would repeat ECG when heart rate improves/normalizes.    All other management as prescribed.

## 2023-07-12 NOTE — H&P ADULT - PROBLEM SELECTOR PLAN 4
Patient reportedly AAOx3 at baseline, now AAOx1 answering questions inappropriately   - likely from sepsis encephalopathy  - pCO2 WNL despite resp status    Plan:  > continue to monitor, expect improvement with tx of infection as above  > delirium precautions Patient reportedly AAOx3 at baseline, now AAOx1 answering questions inappropriately   - likely from sepsis encephalopathy  - pCO2 WNL despite resp status    Plan:  > continue to monitor, expect improvement with tx of infection as above  > hold pain meds for now   > delirium precautions Patient reportedly AAOx3 at baseline, now AAOx1 answering questions inappropriately   - likely from sepsis encephalopathy  - pCO2 WNL despite resp status    Plan:  > continue to monitor, expect improvement with tx of infection as above  > hold opiate pain meds for now   > delirium precautions  > improving mentation

## 2023-07-12 NOTE — H&P ADULT - NSHPPHYSICALEXAM_GEN_ALL_CORE
VITALS:   T(C): 36.5 (07-12-23 @ 18:35), Max: 38.4 (07-12-23 @ 14:54)  HR: 91 (07-12-23 @ 18:35) (91 - 115)  BP: 160/64 (07-12-23 @ 18:35) (129/60 - 160/64)  RR: 18 (07-12-23 @ 18:35) (18 - 22)  SpO2: 96% (07-12-23 @ 18:35) (96% - 97%)    GENERAL: NAD, lying in bed comfortably  HEAD:  Atraumatic, normocephalic  EYES: EOMI, PERRLA, conjunctiva and sclera clear  ENT: Moist mucous membranes  NECK: Supple, no JVD  HEART: Regular rate and rhythm, no murmurs, rubs, or gallops  LUNGS: Unlabored respirations.  Clear to auscultation bilaterally, no crackles, wheezing, or rhonchi  ABDOMEN: Soft, nontender, nondistended, +BS  EXTREMITIES: 2+ peripheral pulses bilaterally. No clubbing, cyanosis, or edema  NERVOUS SYSTEM:  A&Ox3, no focal deficits   SKIN: No rashes or lesions VITALS:   T(C): 36.5 (07-12-23 @ 18:35), Max: 38.4 (07-12-23 @ 14:54)  HR: 91 (07-12-23 @ 18:35) (91 - 115)  BP: 160/64 (07-12-23 @ 18:35) (129/60 - 160/64)  RR: 18 (07-12-23 @ 18:35) (18 - 22)  SpO2: 96% (07-12-23 @ 18:35) (96% - 97%)    GENERAL: NAD, lying in bed comfortably  HEAD:  Atraumatic, normocephalic  EYES: EOMI, conjunctiva and sclera clear  ENT: Dry mucous membranes  HEART: Tachycardic, sinus with PVCs on telemetry, mechanical valve sound  LUNGS: dyspneic, unable to speak in full sentences, +rhonchorous, dec breath sounds R side  ABDOMEN: Soft, nontender, nondistended, +BS  EXTREMITIES: 2+ peripheral pulses bilaterally. No clubbing, cyanosis, or edema  NERVOUS SYSTEM: A&Ox1, no focal deficits   SKIN: No rashes or lesions VITALS:   T(C): 36.5 (07-12-23 @ 18:35), Max: 38.4 (07-12-23 @ 14:54)  HR: 91 (07-12-23 @ 18:35) (91 - 115)  BP: 160/64 (07-12-23 @ 18:35) (129/60 - 160/64)  RR: 18 (07-12-23 @ 18:35) (18 - 22)  SpO2: 96% (07-12-23 @ 18:35) (96% - 97%)    GENERAL: Appears uncomfortable and w/ some increased respiratory effort  HEAD:  Atraumatic, normocephalic  EYES: EOMI, conjunctiva and sclera clear  ENT: Very dry lips.  Dry mucous membranes.  No conjunctival pallor  HEART: Tachycardic, sinus with PVCs on telemetry, mechanical valve sound  LUNGS: dyspneic, unable to speak in full sentences, +rhonchorous, dec breath sounds R side  ABDOMEN: Soft, nontender, nondistended, +BS  EXTREMITIES: 2+ peripheral pulses bilaterally. No clubbing, cyanosis, or edema  NERVOUS SYSTEM: A&Ox1, no focal deficits   SKIN: Very dry lips.  No rashes or lesions

## 2023-07-12 NOTE — H&P ADULT - NSICDXPASTMEDICALHX_GEN_ALL_CORE_FT
PAST MEDICAL HISTORY:  DM2 (diabetes mellitus, type 2)     HTN (hypertension)     Lung cancer      PAST MEDICAL HISTORY:  COPD without exacerbation     Depression with anxiety     DM2 (diabetes mellitus, type 2)     HFrEF (heart failure with reduced ejection fraction)     HLD (hyperlipidemia)     HTN (hypertension)     Hypothyroidism     Lung cancer

## 2023-07-12 NOTE — H&P ADULT - PROBLEM SELECTOR PLAN 6
DVT PPx: lovenox (held in case of procedure)  Diet:   Code:   Dispo:   Communication:    Discharge information:  Pharmacy:  PCP: Patient with hx of mechanical aortic valve replacement  - on warfarin at home  - dose appears to be 5 mg daily per surescripts, however medication not listed on [facility] medication list    Plan:  > follow up INR in AM; if stable continue 5 mg qd  > daily INR while admitted, goal 2.5 - 3.5 Patient with hx of mechanical aortic valve replacement  - on warfarin at home  - dose appears to be 5 mg daily per surescripts, however medication not listed on [facility] medication list    Plan:  > follow up INR, goal 2.5 - 3.5  > if subtherapeutic, consider starting heparin gtt given possibility of bronch Patient with hx of mechanical aortic valve replacement  - on warfarin at home  - dose appears to be 5 mg daily per S[facility], however medication not listed on [facility] medication list    Plan:  > follow up INR, goal 2.5 - 3.5  > if subtherapeutic, consider starting heparin gtt given possibility of bronch

## 2023-07-12 NOTE — H&P ADULT - PROBLEM SELECTOR PLAN 7
Patient on orals at home: metformin 500 mg BID and farxiga 10 mg qd  - glucose on BMP within range thus far    Plan:  > monitor FS for now  > low dose ISS for meals  > can add basal/bolus if needed Patient on orals at home: metformin 500 mg BID and farxiga 10 mg qd  - glucose on BMP within range thus far    Plan:  > monitor FS  > low dose ISS  > can add basal/bolus if needed Patient on orals at home: metformin 500 mg BID and Farxiga 10 mg qd  - glucose on BMP within range thus far    Plan:  > monitor FS  > low dose ISS  > can add basal/bolus if needed

## 2023-07-12 NOTE — H&P ADULT - PROBLEM SELECTOR PLAN 9
Patient on entresto, amlodipine,    Plan:  > resume home meds Patient on entresto, amlodipine, metoprolol    Plan:  > resume home meds Patient on Entresto, amlodipine, metoprolol    Plan:  > resume home meds

## 2023-07-13 DIAGNOSIS — R52 PAIN, UNSPECIFIED: ICD-10-CM

## 2023-07-13 DIAGNOSIS — Z95.2 PRESENCE OF PROSTHETIC HEART VALVE: Chronic | ICD-10-CM

## 2023-07-13 DIAGNOSIS — Z51.5 ENCOUNTER FOR PALLIATIVE CARE: ICD-10-CM

## 2023-07-13 DIAGNOSIS — R94.31 ABNORMAL ELECTROCARDIOGRAM [ECG] [EKG]: ICD-10-CM

## 2023-07-13 PROBLEM — Z00.00 ENCOUNTER FOR PREVENTIVE HEALTH EXAMINATION: Status: ACTIVE | Noted: 2023-07-13

## 2023-07-13 LAB
A1C WITH ESTIMATED AVERAGE GLUCOSE RESULT: 6.4 % — HIGH (ref 4–5.6)
ALBUMIN SERPL ELPH-MCNC: 2.4 G/DL — LOW (ref 3.3–5)
ALP SERPL-CCNC: 390 U/L — HIGH (ref 40–120)
ALT FLD-CCNC: 25 U/L — SIGNIFICANT CHANGE UP (ref 4–33)
ANION GAP SERPL CALC-SCNC: 15 MMOL/L — HIGH (ref 7–14)
APTT BLD: 44.1 SEC — HIGH (ref 27–36.3)
AST SERPL-CCNC: 67 U/L — HIGH (ref 4–32)
BILIRUB SERPL-MCNC: 1.6 MG/DL — HIGH (ref 0.2–1.2)
BUN SERPL-MCNC: 24 MG/DL — HIGH (ref 7–23)
CALCIUM SERPL-MCNC: 12.1 MG/DL — HIGH (ref 8.4–10.5)
CHLORIDE SERPL-SCNC: 108 MMOL/L — HIGH (ref 98–107)
CO2 SERPL-SCNC: 19 MMOL/L — LOW (ref 22–31)
CREAT SERPL-MCNC: 0.77 MG/DL — SIGNIFICANT CHANGE UP (ref 0.5–1.3)
CULTURE RESULTS: SIGNIFICANT CHANGE UP
EGFR: 82 ML/MIN/1.73M2 — SIGNIFICANT CHANGE UP
ESTIMATED AVERAGE GLUCOSE: 137 — SIGNIFICANT CHANGE UP
GLUCOSE BLDC GLUCOMTR-MCNC: 123 MG/DL — HIGH (ref 70–99)
GLUCOSE BLDC GLUCOMTR-MCNC: 124 MG/DL — HIGH (ref 70–99)
GLUCOSE BLDC GLUCOMTR-MCNC: 128 MG/DL — HIGH (ref 70–99)
GLUCOSE BLDC GLUCOMTR-MCNC: 133 MG/DL — HIGH (ref 70–99)
GLUCOSE SERPL-MCNC: 141 MG/DL — HIGH (ref 70–99)
HCT VFR BLD CALC: 30.8 % — LOW (ref 34.5–45)
HCV AB S/CO SERPL IA: 0.08 S/CO — SIGNIFICANT CHANGE UP (ref 0–0.99)
HCV AB SERPL-IMP: SIGNIFICANT CHANGE UP
HGB BLD-MCNC: 9.8 G/DL — LOW (ref 11.5–15.5)
INR BLD: 5.77 RATIO — CRITICAL HIGH (ref 0.88–1.16)
MAGNESIUM SERPL-MCNC: 2.1 MG/DL — SIGNIFICANT CHANGE UP (ref 1.6–2.6)
MCHC RBC-ENTMCNC: 25.5 PG — LOW (ref 27–34)
MCHC RBC-ENTMCNC: 31.8 GM/DL — LOW (ref 32–36)
MCV RBC AUTO: 80.2 FL — SIGNIFICANT CHANGE UP (ref 80–100)
MRSA PCR RESULT.: SIGNIFICANT CHANGE UP
NRBC # BLD: 0 /100 WBCS — SIGNIFICANT CHANGE UP (ref 0–0)
NRBC # FLD: 0.02 K/UL — HIGH (ref 0–0)
PHOSPHATE SERPL-MCNC: 1.2 MG/DL — LOW (ref 2.5–4.5)
PLATELET # BLD AUTO: 420 K/UL — HIGH (ref 150–400)
POTASSIUM SERPL-MCNC: 3.1 MMOL/L — LOW (ref 3.5–5.3)
POTASSIUM SERPL-SCNC: 3.1 MMOL/L — LOW (ref 3.5–5.3)
PROT SERPL-MCNC: 6 G/DL — SIGNIFICANT CHANGE UP (ref 6–8.3)
PROTHROM AB SERPL-ACNC: 68.1 SEC — HIGH (ref 10.5–13.4)
RBC # BLD: 3.84 M/UL — SIGNIFICANT CHANGE UP (ref 3.8–5.2)
RBC # FLD: 16 % — HIGH (ref 10.3–14.5)
S AUREUS DNA NOSE QL NAA+PROBE: SIGNIFICANT CHANGE UP
SODIUM SERPL-SCNC: 142 MMOL/L — SIGNIFICANT CHANGE UP (ref 135–145)
SPECIMEN SOURCE: SIGNIFICANT CHANGE UP
WBC # BLD: 24.51 K/UL — HIGH (ref 3.8–10.5)
WBC # FLD AUTO: 24.51 K/UL — HIGH (ref 3.8–10.5)

## 2023-07-13 PROCEDURE — 99223 1ST HOSP IP/OBS HIGH 75: CPT

## 2023-07-13 PROCEDURE — 93306 TTE W/DOPPLER COMPLETE: CPT | Mod: 26

## 2023-07-13 PROCEDURE — 12345: CPT | Mod: NC

## 2023-07-13 PROCEDURE — 99223 1ST HOSP IP/OBS HIGH 75: CPT | Mod: GC

## 2023-07-13 PROCEDURE — 99222 1ST HOSP IP/OBS MODERATE 55: CPT

## 2023-07-13 RX ORDER — PHYTONADIONE (VIT K1) 5 MG
10 TABLET ORAL ONCE
Refills: 0 | Status: COMPLETED | OUTPATIENT
Start: 2023-07-13 | End: 2023-07-13

## 2023-07-13 RX ORDER — POTASSIUM PHOSPHATE, MONOBASIC POTASSIUM PHOSPHATE, DIBASIC 236; 224 MG/ML; MG/ML
30 INJECTION, SOLUTION INTRAVENOUS ONCE
Refills: 0 | Status: DISCONTINUED | OUTPATIENT
Start: 2023-07-13 | End: 2023-07-13

## 2023-07-13 RX ORDER — HYDROMORPHONE HYDROCHLORIDE 2 MG/ML
0.25 INJECTION INTRAMUSCULAR; INTRAVENOUS; SUBCUTANEOUS ONCE
Refills: 0 | Status: DISCONTINUED | OUTPATIENT
Start: 2023-07-13 | End: 2023-07-13

## 2023-07-13 RX ORDER — SODIUM CHLORIDE 9 MG/ML
4 INJECTION INTRAMUSCULAR; INTRAVENOUS; SUBCUTANEOUS EVERY 6 HOURS
Refills: 0 | Status: DISCONTINUED | OUTPATIENT
Start: 2023-07-13 | End: 2023-07-20

## 2023-07-13 RX ORDER — SODIUM CHLORIDE 9 MG/ML
1000 INJECTION, SOLUTION INTRAVENOUS ONCE
Refills: 0 | Status: COMPLETED | OUTPATIENT
Start: 2023-07-13 | End: 2023-07-13

## 2023-07-13 RX ORDER — SODIUM CHLORIDE 9 MG/ML
1000 INJECTION, SOLUTION INTRAVENOUS
Refills: 0 | Status: DISCONTINUED | OUTPATIENT
Start: 2023-07-13 | End: 2023-07-14

## 2023-07-13 RX ORDER — POTASSIUM PHOSPHATE, MONOBASIC POTASSIUM PHOSPHATE, DIBASIC 236; 224 MG/ML; MG/ML
30 INJECTION, SOLUTION INTRAVENOUS ONCE
Refills: 0 | Status: COMPLETED | OUTPATIENT
Start: 2023-07-13 | End: 2023-07-13

## 2023-07-13 RX ORDER — HYDROMORPHONE HYDROCHLORIDE 2 MG/ML
0.2 INJECTION INTRAMUSCULAR; INTRAVENOUS; SUBCUTANEOUS EVERY 4 HOURS
Refills: 0 | Status: DISCONTINUED | OUTPATIENT
Start: 2023-07-13 | End: 2023-07-15

## 2023-07-13 RX ORDER — POTASSIUM CHLORIDE 20 MEQ
10 PACKET (EA) ORAL
Refills: 0 | Status: COMPLETED | OUTPATIENT
Start: 2023-07-13 | End: 2023-07-13

## 2023-07-13 RX ADMIN — Medication 3 MILLILITER(S): at 21:09

## 2023-07-13 RX ADMIN — Medication 100 MILLIEQUIVALENT(S): at 16:41

## 2023-07-13 RX ADMIN — PIPERACILLIN AND TAZOBACTAM 25 GRAM(S): 4; .5 INJECTION, POWDER, LYOPHILIZED, FOR SOLUTION INTRAVENOUS at 01:40

## 2023-07-13 RX ADMIN — PIPERACILLIN AND TAZOBACTAM 25 GRAM(S): 4; .5 INJECTION, POWDER, LYOPHILIZED, FOR SOLUTION INTRAVENOUS at 10:38

## 2023-07-13 RX ADMIN — PIPERACILLIN AND TAZOBACTAM 25 GRAM(S): 4; .5 INJECTION, POWDER, LYOPHILIZED, FOR SOLUTION INTRAVENOUS at 17:02

## 2023-07-13 RX ADMIN — Medication 3 MILLILITER(S): at 16:59

## 2023-07-13 RX ADMIN — SODIUM CHLORIDE 4 MILLILITER(S): 9 INJECTION INTRAMUSCULAR; INTRAVENOUS; SUBCUTANEOUS at 09:40

## 2023-07-13 RX ADMIN — DAPAGLIFLOZIN 10 MILLIGRAM(S): 10 TABLET, FILM COATED ORAL at 11:02

## 2023-07-13 RX ADMIN — SODIUM CHLORIDE 4 MILLILITER(S): 9 INJECTION INTRAMUSCULAR; INTRAVENOUS; SUBCUTANEOUS at 21:05

## 2023-07-13 RX ADMIN — Medication 102 MILLIGRAM(S): at 17:01

## 2023-07-13 RX ADMIN — Medication 100 MILLIEQUIVALENT(S): at 00:11

## 2023-07-13 RX ADMIN — HYDROMORPHONE HYDROCHLORIDE 0.25 MILLIGRAM(S): 2 INJECTION INTRAMUSCULAR; INTRAVENOUS; SUBCUTANEOUS at 09:45

## 2023-07-13 RX ADMIN — Medication 100 MILLIEQUIVALENT(S): at 08:34

## 2023-07-13 RX ADMIN — HYDROMORPHONE HYDROCHLORIDE 0.25 MILLIGRAM(S): 2 INJECTION INTRAMUSCULAR; INTRAVENOUS; SUBCUTANEOUS at 09:30

## 2023-07-13 RX ADMIN — SODIUM CHLORIDE 50 MILLILITER(S): 9 INJECTION, SOLUTION INTRAVENOUS at 22:34

## 2023-07-13 RX ADMIN — Medication 100 MILLIEQUIVALENT(S): at 16:52

## 2023-07-13 RX ADMIN — Medication 0.25 MILLIGRAM(S): at 16:40

## 2023-07-13 RX ADMIN — Medication 3 MILLILITER(S): at 09:40

## 2023-07-13 RX ADMIN — SODIUM CHLORIDE 500 MILLILITER(S): 9 INJECTION, SOLUTION INTRAVENOUS at 10:05

## 2023-07-13 RX ADMIN — POTASSIUM PHOSPHATE, MONOBASIC POTASSIUM PHOSPHATE, DIBASIC 83.33 MILLIMOLE(S): 236; 224 INJECTION, SOLUTION INTRAVENOUS at 11:03

## 2023-07-13 NOTE — CONSULT NOTE ADULT - ASSESSMENT
73 year old F smoker with newly diagnosed lung cancer with metastasis to lung and liver not yet on treatment, mechanical aortic valve on warfarin, HFrEF, T2DM, COPD, HTN, hypothyroidism, HLD, and depression who presents from [place] for hypoxia and respiratory distress found to have likely post obstructive pna in the setting of endobronchial obstruction vs external compression from malignancy. IP consulted for further reccs     [facility]ob[facility]ul[facility] for obstructing Lung Ca with no stents placed and not yet on treatment [facility]   would continue vanc zosyn as patient recently hospitalized with prolonged course  please obtain sputum culture, RVP, nasal MRSA swab, blood culture, urine legionella, urine streptococcus  needs aggressive airway clearance: humidified oxygen, standing guaifenesin, duonebs q6h, hyper sal q6h, chest PT q6 h, aerobika/acapella q6 h, deep NT suction q6 hr, chest vest q12h  tentatively added on for urgent bronchoscopy tomorrow if cardiac clearance and INR is adequately reversed   please keep patient NPO at MN, adjust insulin as necessary, CBC, CMP, Mag, PHOS, Coags, type and screen, in the early morning   please obtain ECHO  please start Hep ggt and reverse coumadin.   low threshold for MICU consult

## 2023-07-13 NOTE — PATIENT PROFILE ADULT - FUNCTIONAL ASSESSMENT - DAILY ACTIVITY 1.
October 5, 2020      Merari Elias MD  [address] [facility] [address]           [facility]  [address] [place] [id number]  Phone: [phone]  Fax: [phone]          Patient: Craig Ji   MR Number: [id number]   YOB: 1968   Date of Visit: 10/5/2020       Dear Dr. Merari Elias:    Thank you for referring Craig Ji to me for evaluation. Attached you will find relevant portions of my assessment and plan of care.    If you have questions, please do not hesitate to call me. I look forward to following Craig Ji along with you.    Sincerely,    Kwabena Portillo III, MD    Enclosure  CC:  No Recipients    If you would like to receive this communication electronically, please contact [email or web address] or [phone] to request more information on [employer] access.    For providers and/or their staff who would like to refer a patient to [facility], please contact us through our one-stop-shop provider referral line, [facility], at [phone].    If you feel you have received this communication in error or would no longer like to receive these types of communications, please e-mail [email or web address]          1 = Total assistance

## 2023-07-13 NOTE — CONSULT NOTE ADULT - PROBLEM SELECTOR RECOMMENDATION 9
Per chart review, patient with recently diagnosed lung cancer at [facility] (6/2023).   > CTA/p (7/12): Findings suspicious for metastatic lung cancer. Large right hilar mass obstructing the right mainstem bronchus, as further described above, with bilateral pulmonary, judy and hepatic metastases. Indeterminate   bilateral adrenal thickening.  > Per chart review, patient has not started treatment yet.   > Awaiting onc recs

## 2023-07-13 NOTE — DIETITIAN INITIAL EVALUATION ADULT - PROBLEM SELECTOR PLAN 10
EKG with sinus tach + PVCs, prolonged QTc 524  - QTc falsely prolonged in the setting of sinus tachycardia  - recheck when HR returns to normal rate

## 2023-07-13 NOTE — CONSULT NOTE ADULT - ASSESSMENT
73 year old Female with R sided lung cancer with metastasis to lung and liver, mechanical aortic valve on warfarin, ?HFrEF, T2DM, COPD, HTN, hypothyroidism, HLD, and depression who presented on 7/12 from [place] for hypoxia and respiratory distress. Palliative consulted for symptom management in setting of advanced malignancy.

## 2023-07-13 NOTE — DIETITIAN INITIAL EVALUATION ADULT - NSICDXPASTMEDICALHX_GEN_ALL_CORE_FT
PAST MEDICAL HISTORY:  COPD without exacerbation     Depression with anxiety     DM2 (diabetes mellitus, type 2)     HFrEF (heart failure with reduced ejection fraction)     HLD (hyperlipidemia)     HTN (hypertension)     Hypothyroidism     Lung cancer

## 2023-07-13 NOTE — PROVIDER CONTACT NOTE (OTHER) - SITUATION
Patient failed dysphagia screening. Patient started coughing really bad when sip of water was given. Morning meds are on hold to prevent aspiration.

## 2023-07-13 NOTE — CONSULT NOTE ADULT - ASSESSMENT
73 year old F with R sided lung cancer with metastasis to lung and liver, mechanical aortic valve on warfarin (operated on in [place]), HFrEF, T2DM, COPD, HTN, hypothyroidism, HLD, and depression who presents from [place] for hypoxia and respiratory distress.   CT C/A/P without PE, but revealing large right hilar mass obstructing the right mainstem bronchus, along with bilateral pulmonary, judy and hepatic metastases. She has been admitted on iv abx and pulm plans on doing exploratory bronch and possible restenting procedure tomorrow.  Cardiology consulted for perioperative risk stratification    Cardiovascular Risk Stratification - RCRI =  (1).  1. History of ischemic heart disease: No  2. History of congestive heart failure: Yes  3. History of cerebrovascular disease (stroke or transient ischemic attack): No  4. History of diabetes requiring preoperative insulin use: No  5. Chronic kidney disease (creatinine > 2 mg/dL): No  6. Undergoing suprainguinal vascular, intraperitoneal, or intrathoracic surgery: No  0 predictors = 0.4%, 1 predictor = 0.9%, 2 predictors = 6.6%, =3 predictors = >11%    - Overall this patient is as _0.9_% risk (for cardiac death, nonfatal myocardial infarction, and nonfatal cardiac arrest perioperatively for this _low-moderate_risk procedure).  - She has no evidence of ACS, unstable angina. Her hypoxemic respiratory failure is likely 2/2 bronchial obstruction from mass rather than acute decompensated heart failure. She has no unstable arrhythmias or critical valvular lesions appreciated. No further cardiac testing is recommended at this time  - Continue patient's GDMT (metop, atorva, entresto). Discontinue patient's hydrochlorothiazide. Dapagliflozin should be held given risk of perioperative euglycemic DKA   - Resume patient's Coumadin for mechanical valve w/ hep gtt bridge as soon as s/p interventions  73 year old F with R sided lung cancer with metastasis to lung and liver, mechanical aortic valve on warfarin (operated on in [place]), HFrEF, T2DM, COPD, HTN, hypothyroidism, HLD, and depression who presents from [place] for hypoxia and respiratory distress.   CT C/A/P without PE, but revealing large right hilar mass obstructing the right mainstem bronchus, along with bilateral pulmonary, judy and hepatic metastases. She has been admitted on iv abx and pulm plans on doing exploratory bronch and possible restenting procedure tomorrow.  Cardiology consulted for perioperative risk stratification    Update: Echo this admission with the following:  DIMENSIONS:  Dimensions:     Normal Values:  LA:     3.0 cm    2.0 - 4.0 cm  Ao:     2.4 cm    2.0 - 3.8 cm  SEPTUM: 1.1 cm    0.6 - 1.2 cm  PWT:    1.1 cm    0.6 - 1.1 cm  LVIDd:  4.2 cm    3.0 - 5.6 cm  LVIDs:    ---     1.8 - 4.0 cm  Derived Variables:  LVMI: 90 g/m2  RWT: 0.52  Ejection Fraction (Visual Estimate): 70 %  ------------------------------------------------------------------------  OBSERVATIONS:  Mitral Valve: Mitral annular calcification, otherwise  normal mitral valve.  Aortic Root: Normal aortic root. Possible ascending aorta  dilatation.  Aortic Valve: Mechanical aortic valve prosthesis. Peak  transaortic valve gradient equals 26 mm Hg, mean  transaortic valve gradient equals 15 mm Hg, aortic valve  velocity time integral equals 48 cm, consistent with mild  aortic stenosis.  Left Atrium: Normal left atrium.  Left Ventricle: Endocardium not well visualized;grossly  normal left ventricular systolic function. Normal left  ventricular internal dimensions and wall thicknesses.  Right Heart: Normal right atrium. The right ventricle is  not well visualized; grossly normal right ventricular  systolic function. Normal tricuspid valve. Mild tricuspid  regurgitation. Normal pulmonic valve.  Pericardium/PleuraNormal pericardium with no pericardial  effusion.  Hemodynamic: Estimated right ventricular systolic pressure  equals 43 mm Hg, assuming right atrial pressure equals 10  mm Hg, consistent with mild pulmonary hypertension.  ------------------------------------------------------------------------  CONCLUSIONS:  1. Mitral annular calcification, otherwise normal mitral  valve.  2. Mechanical aortic valveprosthesis. Peak transaortic  valve gradient equals 26 mm Hg, mean transaortic valve  gradient equals 15 mm Hg, aortic valve velocity time  integral equals 48 cm, consistent with mild aortic  stenosis.  3. Endocardium not well visualized; grossly normal left  ventricular systolic function.  4. The right ventricle is not well visualized; grossly  normal right ventricular systolic function.    Cardiovascular Risk Stratification - RCRI =  (1).  1. History of ischemic heart disease: No  2. History of congestive heart failure: Yes  3. History of cerebrovascular disease (stroke or transient ischemic attack): No  4. History of diabetes requiring preoperative insulin use: No  5. Chronic kidney disease (creatinine > 2 mg/dL): No  6. Undergoing suprainguinal vascular, intraperitoneal, or intrathoracic surgery: No  0 predictors = 0.4%, 1 predictor = 0.9%, 2 predictors = 6.6%, =3 predictors = >11%    - Overall this patient is as _0.9_% risk (for cardiac death, nonfatal myocardial infarction, and nonfatal cardiac arrest perioperatively for this _low-moderate_risk procedure).  - She has no evidence of ACS, unstable angina. Her hypoxemic respiratory failure is likely 2/2 bronchial obstruction from mass rather than acute decompensated heart failure. She has no unstable arrhythmias or critical valvular lesions appreciated. No further cardiac testing is recommended at this time  - Continue patient's GDMT (metop, atorva, entresto). Discontinue patient's hydrochlorothiazide. Dapagliflozin should be held given risk of perioperative euglycemic DKA   - Resume patient's Coumadin for mechanical valve w/ hep gtt bridge as soon as s/p interventions     Please call back if additional questions or concerns.

## 2023-07-13 NOTE — CONSULT NOTE ADULT - SUBJECTIVE AND OBJECTIVE BOX
CHIEF COMPLAINT:    HPI:  HPI:  Patient is a 73 year old F with R sided lung cancer with metastasis to lung and liver, mechanical aortic valve on warfarin, ?HFrEF, T2DM, COPD, HTN, hypothyroidism, HLD, and depression who presents from [place] for hypoxia and respiratory distress. Per NH staff, the patient began having sudden onset respiratory distress around 11 AM prior to arrival and was found to be hypoxic to the 80s at the time. The patient was placed on nasal cannula by EMS with improvement. The patient cannot participate in the history given her altered mental status.    In the ED, vitals notable for , TMax 38.4C, /60, SpO2 97% on 6L NC. Labs remarkable for WBC 20, hgb 9.4, hsTropT 89 --> 83, K 3.1 and mild LFT elevation. CT C/A/P without PE, but revealing large right hilar mass obstructing the right mainstem bronchus, along with bilateral pulmonary, judy and hepatic metastases. The patient received 2.5L IVF, vanc/zosyn, and IV tylenol. (12 Jul 2023 21:16)    unable to obtain further history from patient herself however endorses shortness of breath  however spoke to daughter extensively   patient was recently admitted to [facility] s/p endobronchial debulking for obstructing Lung Ca not yet on treatment and was discharged to [facility]    PAST MEDICAL & SURGICAL HISTORY:  Lung cancer      HTN (hypertension)      DM2 (diabetes mellitus, type 2)      COPD without exacerbation      Hypothyroidism      HFrEF (heart failure with reduced ejection fraction)      HLD (hyperlipidemia)      Depression with anxiety      H/O aortic valve replacement          FAMILY HISTORY:  Family history unknown        SOCIAL HISTORY:  Smoking: [ ] Never Smoked [ ] Former Smoker (__ packs x ___ years) [ ] Current Smoker  (__ packs x ___ years)  Substance Use: [ ] Never Used [ ] Used ____  EtOH Use:  Marital Status: [ ] Single [ ]  [ ]  [ ]   Sexual History:   Occupation:  Recent Travel:  Country of Birth:  Advance Directives:    Allergies    morphine (Unknown)    Intolerances        HOME MEDICATIONS:  Home Medications:  amLODIPine 2.5 mg oral tablet: 1 tab(s) orally once a day (at bedtime) (12 Jul 2023 22:52)  droNABinol 2.5 mg oral capsule: 1 tab(s) orally 2 times a day (12 Jul 2023 22:35)  Entresto 24 mg-26 mg oral tablet: 1 tab(s) orally 2 times a day (12 Jul 2023 22:48)  Farxiga 10 mg oral tablet: 1 tab(s) orally once a day (12 Jul 2023 22:47)  fentaNYL 37.5 mcg/hr transdermal film, extended release: 1 patch transdermally every 3 days (12 Jul 2023 22:35)  gabapentin 600 mg oral tablet: 1 tab(s) orally once a day (at bedtime) (12 Jul 2023 22:35)  hydroCHLOROthiazide 25 mg oral tablet: 1 tab(s) orally once a day (12 Jul 2023 22:48)  HYDROmorphone 1 mg/mL oral liquid: 2 milligram(s) orally every 4 hours as needed for  severe pain (12 Jul 2023 22:35)  levalbuterol 0.63 mg/3 mL inhalation solution: 3 milliliter(s) by nebulizer every 8 hours as needed for  shortness of breath and/or wheezing (12 Jul 2023 22:35)  levothyroxine 25 mcg (0.025 mg) oral tablet: 1 tab(s) orally once a day (12 Jul 2023 22:35)  magnesium oxide 400 mg oral tablet: 1 tab(s) orally once a day (12 Jul 2023 22:35)  metFORMIN 500 mg oral tablet: 1 tab(s) orally 2 times a day (12 Jul 2023 22:49)  metoprolol tartrate 100 mg oral tablet: 1 tab(s) orally once a day (12 Jul 2023 22:49)  MiraLax oral powder for reconstitution: 17 gram(s) orally once a day (12 Jul 2023 22:35)  Protonix 40 mg oral delayed release tablet: 1 tab(s) orally once a day (12 Jul 2023 22:35)  rosuvastatin 40 mg oral tablet: 1 tab(s) orally once a day (at bedtime) (12 Jul 2023 22:35)  tiZANidine 4 mg oral tablet: 1 tab(s) orally every 12 hours (12 Jul 2023 22:35)  Verquvo 2.5 mg oral tablet: 1 tab(s) orally once a day (12 Jul 2023 22:35)  warfarin 5 mg oral tablet: 1 orally once a day (12 Jul 2023 22:52)  Zetia 10 mg oral tablet: 1 orally once a day (at bedtime) (12 Jul 2023 22:35)      REVIEW OF SYSTEMS:  Constitutional: [ ] negative [ ] fevers [ ] chills [ ] weight loss [ ] weight gain  HEENT: [ ] negative [ ] dry eyes [ ] eye irritation [ ] postnasal drip [ ] nasal congestion  CV: [ ] negative  [ ] chest pain [ ] orthopnea [ ] palpitations [ ] murmur  Resp: [ ] negative [ ] cough [ ] shortness of breath [ ] dyspnea [ ] wheezing [ ] sputum [ ] hemoptysis  GI: [ ] negative [ ] nausea [ ] vomiting [ ] diarrhea [ ] constipation [ ] abd pain [ ] dysphagia   : [ ] negative [ ] dysuria [ ] nocturia [ ] hematuria [ ] increased urinary frequency  Musculoskeletal: [ ] negative [ ] back pain [ ] myalgias [ ] arthralgias [ ] fracture  Skin: [ ] negative [ ] rash [ ] itch  Neurological: [ ] negative [ ] headache [ ] dizziness [ ] syncope [ ] weakness [ ] numbness  Psychiatric: [ ] negative [ ] anxiety [ ] depression  Endocrine: [ ] negative [ ] diabetes [ ] thyroid problem  Hematologic/Lymphatic: [ ] negative [ ] anemia [ ] bleeding problem  Allergic/Immunologic: [ ] negative [ ] itchy eyes [ ] nasal discharge [ ] hives [ ] angioedema  [ ] All other systems negative  [ ] Unable to assess ROS because ________    OBJECTIVE:  ICU Vital Signs Last 24 Hrs  T(C): 36.3 (13 Jul 2023 09:26), Max: 36.6 (12 Jul 2023 22:35)  T(F): 97.4 (13 Jul 2023 09:26), Max: 97.9 (12 Jul 2023 22:35)  HR: 90 (13 Jul 2023 09:44) (90 - 108)  BP: 155/76 (13 Jul 2023 09:26) (139/72 - 160/64)  BP(mean): --  ABP: --  ABP(mean): --  RR: 19 (13 Jul 2023 09:26) (18 - 22)  SpO2: 97% (13 Jul 2023 09:26) (96% - 97%)    O2 Parameters below as of 13 Jul 2023 09:26  Patient On (Oxygen Delivery Method): nasal cannula              CAPILLARY BLOOD GLUCOSE      POCT Blood Glucose.: 124 mg/dL (13 Jul 2023 11:06)      PHYSICAL EXAM:  General: awake and alert, nontoxic appearing *** lying in bed  HEENT: NC/AT, EOMI b/l, conjunctiva normal, MMM  Lymph Nodes: no cervical LAD  Neck: supple. full range of motion  Respiratory: CTA b/l, no w/r/c, appears comfortable on ***, no conversational dyspnea or accessory muscle use  Cardiovascular: S1 S2 present, RRR, no m/r/g  Abdomen: soft, NT/ND, +BS  Extremities: no c/c/e  Skin: no rashes or lesions noted  Neurological: AAOx3, no focal deficits  Psychiatry: calm, cooperative    LINES:     HOSPITAL MEDICATIONS:  Standing Meds:  albuterol/ipratropium for Nebulization 3 milliLiter(s) Nebulizer every 6 hours  atorvastatin 80 milliGRAM(s) Oral at bedtime  dapagliflozin 10 milliGRAM(s) Oral every 24 hours  dextrose 5%. 1000 milliLiter(s) IV Continuous <Continuous>  dextrose 5%. 1000 milliLiter(s) IV Continuous <Continuous>  dextrose 50% Injectable 25 Gram(s) IV Push once  dextrose 50% Injectable 25 Gram(s) IV Push once  dextrose 50% Injectable 12.5 Gram(s) IV Push once  glucagon  Injectable 1 milliGRAM(s) IntraMuscular once  hydrochlorothiazide 25 milliGRAM(s) Oral daily  insulin lispro (ADMELOG) corrective regimen sliding scale   SubCutaneous three times a day before meals  insulin lispro (ADMELOG) corrective regimen sliding scale   SubCutaneous at bedtime  levothyroxine 25 MICROGram(s) Oral daily  metoprolol tartrate 100 milliGRAM(s) Oral daily  pantoprazole    Tablet 40 milliGRAM(s) Oral before breakfast  phytonadione  IVPB 10 milliGRAM(s) IV Intermittent once  piperacillin/tazobactam IVPB.. 3.375 Gram(s) IV Intermittent every 8 hours  polyethylene glycol 3350 17 Gram(s) Oral daily  potassium chloride  10 mEq/100 mL IVPB 10 milliEquivalent(s) IV Intermittent every 1 hour  sacubitril 24 mG/valsartan 26 mG 1 Tablet(s) Oral two times a day  sodium chloride 3%  Inhalation 4 milliLiter(s) Inhalation every 12 hours      PRN Meds:  acetaminophen     Tablet .. 650 milliGRAM(s) Oral every 6 hours PRN  dextrose Oral Gel 15 Gram(s) Oral once PRN  HYDROmorphone  Injectable 0.2 milliGRAM(s) IV Push every 4 hours PRN      LABS:                        9.8    24.51 )-----------( 420      ( 13 Jul 2023 06:06 )             30.8     Hgb Trend: 9.8<--, 9.4<--  07-13    142  |  108<H>  |  24<H>  ----------------------------<  141<H>  3.1<L>   |  19<L>  |  0.77    Ca    12.1<H>      13 Jul 2023 06:06  Phos  1.2     07-13  Mg     2.10     07-13    TPro  6.0  /  Alb  2.4<L>  /  TBili  1.6<H>  /  DBili  x   /  AST  67<H>  /  ALT  25  /  AlkPhos  390<H>  07-13    Creatinine Trend: 0.77<--, 0.94<--  PT/INR - ( 13 Jul 2023 02:45 )   PT: 68.1 sec;   INR: 5.77 ratio         PTT - ( 13 Jul 2023 02:45 )  PTT:44.1 sec  Urinalysis Basic - ( 13 Jul 2023 06:06 )    Color: x / Appearance: x / SG: x / pH: x  Gluc: 141 mg/dL / Ketone: x  / Bili: x / Urobili: x   Blood: x / Protein: x / Nitrite: x   Leuk Esterase: x / RBC: x / WBC x   Sq Epi: x / Non Sq Epi: x / Bacteria: x        Venous Blood Gas:  07-12 @ 19:30  7.40/36/80/22/97.3  VBG Lactate: 2.8  Venous Blood Gas:  07-12 @ 17:00  7.40/37/68/23/94.0  VBG Lactate: 3.6  Venous Blood Gas:  07-12 @ 14:30  7.46/34/78/24/97.1  VBG Lactate: 4.2      MICROBIOLOGY:       RADIOLOGY:  [ ] Reviewed and interpreted by me    PULMONARY FUNCTION TESTS:    EKG:   CHIEF COMPLAINT:    HPI:  HPI:  Patient is a 73 year old F with R sided lung cancer with metastasis to lung and liver, mechanical aortic valve on warfarin, ?HFrEF, T2DM, COPD, HTN, hypothyroidism, HLD, and depression who presents from [place] for hypoxia and respiratory distress. Per NH staff, the patient began having sudden onset respiratory distress around 11 AM prior to arrival and was found to be hypoxic to the 80s at the time. The patient was placed on nasal cannula by EMS with improvement. The patient cannot participate in the history given her altered mental status.    In the ED, vitals notable for , TMax 38.4C, /60, SpO2 97% on 6L NC. Labs remarkable for WBC 20, hgb 9.4, hsTropT 89 --> 83, K 3.1 and mild LFT elevation. CT C/A/P without PE, but revealing large right hilar mass obstructing the right mainstem bronchus, along with bilateral pulmonary, judy and hepatic metastases. The patient received 2.5L IVF, vanc/zosyn, and IV tylenol. (12 Jul 2023 21:16)    unable to obtain further history from patient herself however endorses shortness of breath  however spoke to daughter extensively   patient was recently admitted to [facility] s/p endobronchial debulking for obstructing Lung Ca not yet on treatment and was discharged to [facility]    PAST MEDICAL & SURGICAL HISTORY:  Lung cancer      HTN (hypertension)      DM2 (diabetes mellitus, type 2)      COPD without exacerbation      Hypothyroidism      HFrEF (heart failure with reduced ejection fraction)      HLD (hyperlipidemia)      Depression with anxiety      H/O aortic valve replacement          FAMILY HISTORY:  Family history unknown        SOCIAL HISTORY:  smoker, from [place], former doctor    Allergies    morphine (Unknown)    Intolerances        HOME MEDICATIONS:  Home Medications:  amLODIPine 2.5 mg oral tablet: 1 tab(s) orally once a day (at bedtime) (12 Jul 2023 22:52)  droNABinol 2.5 mg oral capsule: 1 tab(s) orally 2 times a day (12 Jul 2023 22:35)  Entresto 24 mg-26 mg oral tablet: 1 tab(s) orally 2 times a day (12 Jul 2023 22:48)  Farxiga 10 mg oral tablet: 1 tab(s) orally once a day (12 Jul 2023 22:47)  fentaNYL 37.5 mcg/hr transdermal film, extended release: 1 patch transdermally every 3 days (12 Jul 2023 22:35)  gabapentin 600 mg oral tablet: 1 tab(s) orally once a day (at bedtime) (12 Jul 2023 22:35)  hydroCHLOROthiazide 25 mg oral tablet: 1 tab(s) orally once a day (12 Jul 2023 22:48)  HYDROmorphone 1 mg/mL oral liquid: 2 milligram(s) orally every 4 hours as needed for  severe pain (12 Jul 2023 22:35)  levalbuterol 0.63 mg/3 mL inhalation solution: 3 milliliter(s) by nebulizer every 8 hours as needed for  shortness of breath and/or wheezing (12 Jul 2023 22:35)  levothyroxine 25 mcg (0.025 mg) oral tablet: 1 tab(s) orally once a day (12 Jul 2023 22:35)  magnesium oxide 400 mg oral tablet: 1 tab(s) orally once a day (12 Jul 2023 22:35)  metFORMIN 500 mg oral tablet: 1 tab(s) orally 2 times a day (12 Jul 2023 22:49)  metoprolol tartrate 100 mg oral tablet: 1 tab(s) orally once a day (12 Jul 2023 22:49)  MiraLax oral powder for reconstitution: 17 gram(s) orally once a day (12 Jul 2023 22:35)  Protonix 40 mg oral delayed release tablet: 1 tab(s) orally once a day (12 Jul 2023 22:35)  rosuvastatin 40 mg oral tablet: 1 tab(s) orally once a day (at bedtime) (12 Jul 2023 22:35)  tiZANidine 4 mg oral tablet: 1 tab(s) orally every 12 hours (12 Jul 2023 22:35)  Verquvo 2.5 mg oral tablet: 1 tab(s) orally once a day (12 Jul 2023 22:35)  warfarin 5 mg oral tablet: 1 orally once a day (12 Jul 2023 22:52)  Zetia 10 mg oral tablet: 1 orally once a day (at bedtime) (12 Jul 2023 22:35)      REVIEW OF SYSTEMS:  unable to assess    OBJECTIVE:  ICU Vital Signs Last 24 Hrs  T(C): 36.3 (13 Jul 2023 09:26), Max: 36.6 (12 Jul 2023 22:35)  T(F): 97.4 (13 Jul 2023 09:26), Max: 97.9 (12 Jul 2023 22:35)  HR: 90 (13 Jul 2023 09:44) (90 - 108)  BP: 155/76 (13 Jul 2023 09:26) (139/72 - 160/64)  BP(mean): --  ABP: --  ABP(mean): --  RR: 19 (13 Jul 2023 09:26) (18 - 22)  SpO2: 97% (13 Jul 2023 09:26) (96% - 97%)    O2 Parameters below as of 13 Jul 2023 09:26  Patient On (Oxygen Delivery Method): nasal cannula              CAPILLARY BLOOD GLUCOSE      POCT Blood Glucose.: 124 mg/dL (13 Jul 2023 11:06)      PHYSICAL EXAM:  General: awake and alert,  HEENT: NC/AT, EOMI b/l, conjunctiva normal, MMM  Lymph Nodes: no cervical LAD  Neck: supple. full range of motion  Respiratory: coarse rhonchi and wheezeing   Cardiovascular: S1 S2 present, RRR, no m/r/g  Abdomen: soft, NT/ND, +BS  Extremities: no c/c/e  Skin: no rashes or lesions noted  Neurological: AAOx1, no focal deficits  Psychiatry: calm, cooperative    LINES:     HOSPITAL MEDICATIONS:  Standing Meds:  albuterol/ipratropium for Nebulization 3 milliLiter(s) Nebulizer every 6 hours  atorvastatin 80 milliGRAM(s) Oral at bedtime  dapagliflozin 10 milliGRAM(s) Oral every 24 hours  dextrose 5%. 1000 milliLiter(s) IV Continuous <Continuous>  dextrose 5%. 1000 milliLiter(s) IV Continuous <Continuous>  dextrose 50% Injectable 25 Gram(s) IV Push once  dextrose 50% Injectable 25 Gram(s) IV Push once  dextrose 50% Injectable 12.5 Gram(s) IV Push once  glucagon  Injectable 1 milliGRAM(s) IntraMuscular once  hydrochlorothiazide 25 milliGRAM(s) Oral daily  insulin lispro (ADMELOG) corrective regimen sliding scale   SubCutaneous three times a day before meals  insulin lispro (ADMELOG) corrective regimen sliding scale   SubCutaneous at bedtime  levothyroxine 25 MICROGram(s) Oral daily  metoprolol tartrate 100 milliGRAM(s) Oral daily  pantoprazole    Tablet 40 milliGRAM(s) Oral before breakfast  phytonadione  IVPB 10 milliGRAM(s) IV Intermittent once  piperacillin/tazobactam IVPB.. 3.375 Gram(s) IV Intermittent every 8 hours  polyethylene glycol 3350 17 Gram(s) Oral daily  potassium chloride  10 mEq/100 mL IVPB 10 milliEquivalent(s) IV Intermittent every 1 hour  sacubitril 24 mG/valsartan 26 mG 1 Tablet(s) Oral two times a day  sodium chloride 3%  Inhalation 4 milliLiter(s) Inhalation every 12 hours      PRN Meds:  acetaminophen     Tablet .. 650 milliGRAM(s) Oral every 6 hours PRN  dextrose Oral Gel 15 Gram(s) Oral once PRN  HYDROmorphone  Injectable 0.2 milliGRAM(s) IV Push every 4 hours PRN      LABS:                        9.8    24.51 )-----------( 420      ( 13 Jul 2023 06:06 )             30.8     Hgb Trend: 9.8<--, 9.4<--  07-13    142  |  108<H>  |  24<H>  ----------------------------<  141<H>  3.1<L>   |  19<L>  |  0.77    Ca    12.1<H>      13 Jul 2023 06:06  Phos  1.2     07-13  Mg     2.10     07-13    TPro  6.0  /  Alb  2.4<L>  /  TBili  1.6<H>  /  DBili  x   /  AST  67<H>  /  ALT  25  /  AlkPhos  390<H>  07-13    Creatinine Trend: 0.77<--, 0.94<--  PT/INR - ( 13 Jul 2023 02:45 )   PT: 68.1 sec;   INR: 5.77 ratio         PTT - ( 13 Jul 2023 02:45 )  PTT:44.1 sec  Urinalysis Basic - ( 13 Jul 2023 06:06 )    Color: x / Appearance: x / SG: x / pH: x  Gluc: 141 mg/dL / Ketone: x  / Bili: x / Urobili: x   Blood: x / Protein: x / Nitrite: x   Leuk Esterase: x / RBC: x / WBC x   Sq Epi: x / Non Sq Epi: x / Bacteria: x        Venous Blood Gas:  07-12 @ 19:30  7.40/36/80/22/97.3  VBG Lactate: 2.8  Venous Blood Gas:  07-12 @ 17:00  7.40/37/68/23/94.0  VBG Lactate: 3.6  Venous Blood Gas:  07-12 @ 14:30  7.46/34/78/24/97.1  VBG Lactate: 4.2      MICROBIOLOGY:       RADIOLOGY:  [ ] Reviewed and interpreted by me    PULMONARY FUNCTION TESTS:    EKG:

## 2023-07-13 NOTE — DIETITIAN INITIAL EVALUATION ADULT - PROBLEM SELECTOR PLAN 2
Likely in the setting of post-obstructive PNA and worsening lung cancer  - found to be hypxoxic  - now stable on 6L NC, SpO2 94-96%    Plan:  > abx as above  > aggressive pulmonary toilet: chest PT, duo-neb, hypersal  > HOB elevation  > aspiration precautions  > pulm consult in AM; patient may benefit from further stenting

## 2023-07-13 NOTE — PATIENT PROFILE ADULT - FALL HARM RISK - HARM RISK INTERVENTIONS

## 2023-07-13 NOTE — DIETITIAN INITIAL EVALUATION ADULT - PROBLEM SELECTOR PLAN 4
Patient reportedly AAOx3 at baseline, now AAOx1 answering questions inappropriately   - likely from sepsis encephalopathy  - pCO2 WNL despite resp status    Plan:  > continue to monitor, expect improvement with tx of infection as above  > hold opiate pain meds for now   > delirium precautions  > improving mentation

## 2023-07-13 NOTE — DIETITIAN INITIAL EVALUATION ADULT - PROBLEM SELECTOR PLAN 5
Patient without documented history of HFrEF; however on GDMT per Outpt medication review  - on Farxiga, Entresto, vericiguat, metoprolol at home    Plan:  > continue with home meds, kidney function stable  > repeat TTE given unclear baseline and diagnosis

## 2023-07-13 NOTE — PROGRESS NOTE ADULT - PROBLEM SELECTOR PLAN 4
Patient reportedly AAOx3 at baseline, now AAOx1 answering questions inappropriately   - likely from sepsis encephalopathy  - pCO2 WNL despite resp status    Plan:  > continue to monitor, expect improvement with tx of infection as above  > hold opiate pain meds for now   > delirium precautions  > improving mentation Patient reportedly AAOx3 at baseline, now AAOx1 answering questions inappropriately   - likely from sepsis encephalopathy  - pCO2 WNL despite resp status    Plan:  > continue to monitor, expect improvement with tx of infection as above  > delirium precautions  > improving mentation

## 2023-07-13 NOTE — CONSULT NOTE ADULT - PROBLEM SELECTOR RECOMMENDATION 5
Patient's daughter, Zhanna Chang, [phone], is listed as her emergency contact. Patient also gave provider permission to speak to her, however discussed case with Dr. Last who just spoke to patient's daughter. Daughter very overwhelmed at this time and asking for no further calls today unless emergent.     Thank you for allowing us to participate in your patient's care. We will continue to follow with you. Please page [phone] for any q's or c's. The [facility]iatric and Palliative Medicine service has coverage 24 hours a day/ 7 days a week to provide medical recommendations regarding symptom management needs via telephone.    Melissa Hernandez D.O.   Palliative Medicine

## 2023-07-13 NOTE — CONSULT NOTE ADULT - SUBJECTIVE AND OBJECTIVE BOX
CARDIOLOGY FELLOW CONSULT NOTE    HPI:  Patient is a 73 year old F with R sided lung cancer with metastasis to lung and liver, mechanical aortic valve on warfarin (operated on in [place]), HFrEF, T2DM, COPD, HTN, hypothyroidism, HLD, and depression who presents from [place] for hypoxia and respiratory distress. Per NH staff, the patient began having sudden onset respiratory distress around 11 AM prior to arrival and was found to be hypoxic to the 80s at the time. The patient was placed on nasal cannula by EMS with improvement.   In the ED, vitals notable for , TMax 38.4C, /60, SpO2 97% on 6L NC. Labs remarkable for WBC 20, hgb 9.4, hsTropT 89 --> 83, K 3.1 and mild LFT elevation. CT C/A/P without PE, but revealing large right hilar mass obstructing the right mainstem bronchus, along with bilateral pulmonary, judy and hepatic metastases. The patient received 2.5L IVF, vanc/zosyn, and IV tylenol.    Hospital Course:  Cardiology consulted for perioperative risk stratification as pulm plans on doing possible bronch with re-stenting tomorrow AM.  Patient is awake, alert, on 6L NC laying supine. Says difficulty breathing is not worse laying down vs. sitting up. No chest pain whatsoever. No STEPHANIE   Cre 0.77; hstrop 89 > 83   ECG reviewed, with RBBB and atrial bigeminy  ID following, on antibiotics  REceived vitamin K for INR reversal   Palliative consulted, for pain management    PMHx:   Lung cancer  HTN (hypertension)  DM2 (diabetes mellitus, type 2)  COPD without exacerbation  Hypothyroidism  HFrEF (heart failure with reduced ejection fraction)  HLD (hyperlipidemia)  Depression with anxiety    PSHx:   H/O aortic valve replacement    Allergies:  morphine (Unknown)    Current Medications:   acetaminophen     Tablet .. 650 milliGRAM(s) Oral every 6 hours PRN  albuterol/ipratropium for Nebulization 3 milliLiter(s) Nebulizer every 6 hours  atorvastatin 80 milliGRAM(s) Oral at bedtime  dapagliflozin 10 milliGRAM(s) Oral every 24 hours  dextrose 5%. 1000 milliLiter(s) IV Continuous <Continuous>  dextrose 5%. 1000 milliLiter(s) IV Continuous <Continuous>  dextrose 50% Injectable 25 Gram(s) IV Push once  dextrose 50% Injectable 25 Gram(s) IV Push once  dextrose 50% Injectable 12.5 Gram(s) IV Push once  dextrose Oral Gel 15 Gram(s) Oral once PRN  glucagon  Injectable 1 milliGRAM(s) IntraMuscular once  hydrochlorothiazide 25 milliGRAM(s) Oral daily  HYDROmorphone  Injectable 0.2 milliGRAM(s) IV Push every 4 hours PRN  insulin lispro (ADMELOG) corrective regimen sliding scale   SubCutaneous three times a day before meals  insulin lispro (ADMELOG) corrective regimen sliding scale   SubCutaneous at bedtime  levothyroxine 25 MICROGram(s) Oral daily  metoprolol tartrate 100 milliGRAM(s) Oral daily  pantoprazole    Tablet 40 milliGRAM(s) Oral before breakfast  phytonadione  IVPB 10 milliGRAM(s) IV Intermittent once  piperacillin/tazobactam IVPB.. 3.375 Gram(s) IV Intermittent every 8 hours  polyethylene glycol 3350 17 Gram(s) Oral daily  potassium chloride  10 mEq/100 mL IVPB 10 milliEquivalent(s) IV Intermittent every 1 hour  sacubitril 24 mG/valsartan 26 mG 1 Tablet(s) Oral two times a day  sodium chloride 3%  Inhalation 4 milliLiter(s) Inhalation every 12 hours    FAMILY HISTORY:  Family history unknown    REVIEW OF SYSTEMS:  CONSTITUTIONAL: No weakness, fevers or chills  EYES/ENT: No visual changes;  No dysphagia  NECK: No pain or stiffness  RESPIRATORY: No cough, wheezing, hemoptysis; No shortness of breath  CARDIOVASCULAR: No chest pain or palpitations; No lower extremity edema  GASTROINTESTINAL: No abdominal or epigastric pain. No nausea, vomiting, or hematemesis; No diarrhea or constipation. No melena or hematochezia.  BACK: No back pain  GENITOURINARY: No dysuria, frequency or hematuria  NEUROLOGICAL: No numbness or weakness  SKIN: No itching, burning, rashes, or lesions   All other review of systems is negative unless indicated above.    Physical Exam:  T(F): 97.4 (07-13), Max: 97.9 (07-12)  HR: 90 (07-13) (90 - 108)  BP: 155/76 (07-13) (139/72 - 160/64)  RR: 19 (07-13)  SpO2: 97% (07-13)  GENERAL: Chronically ill appearing   HEAD:  Atraumatic, Normocephalic  ENT: EOMI, PERRLA, conjunctiva and sclera clear, Neck supple, No JVD, moist mucosa  CHEST/LUNG: Clear to auscultation bilaterally anteriorly  BACK: No spinal tenderness  HEART: Regular rate and rhythm; No murmurs, rubs, or gallops. +RACHEL   ABDOMEN: Soft, Nontender, Nondistended; Bowel sounds present  EXTREMITIES:  No clubbing, cyanosis, or edema  PSYCH: Nl behavior, nl affect  NEUROLOGY: AAOx3, non-focal, cranial nerves intact  SKIN: Normal color, No rashes or lesions    ECG: RBBB, atrial bigeminy     Labs: Personally reviewed                        9.8    24.51 )-----------( 420      ( 13 Jul 2023 06:06 )             30.8     07-13    142  |  108<H>  |  24<H>  ----------------------------<  141<H>  3.1<L>   |  19<L>  |  0.77    Ca    12.1<H>      13 Jul 2023 06:06  Phos  1.2     07-13  Mg     2.10     07-13    TPro  6.0  /  Alb  2.4<L>  /  TBili  1.6<H>  /  DBili  x   /  AST  67<H>  /  ALT  25  /  AlkPhos  390<H>  07-13    PT/INR - ( 13 Jul 2023 02:45 )   PT: 68.1 sec;   INR: 5.77 ratio         PTT - ( 13 Jul 2023 02:45 )  PTT:44.1 sec    CARDIAC MARKERS ( 12 Jul 2023 17:00 )  83 ng/L / x     / x     / x     / x     / 2.1 ng/mL  CARDIAC MARKERS ( 12 Jul 2023 14:30 )  89 ng/L / x     / x     / x     / x     / x                      Signed by:  Jere Holder MD  PGY4 Cardiology   CARDIOLOGY FELLOW CONSULT NOTE    HPI:  Patient is a 73 year old F with R sided lung cancer with metastasis to lung and liver, mechanical aortic valve on warfarin (operated on in [place]), HFrEF, T2DM, COPD, HTN, hypothyroidism, HLD, and depression who presents from [place] for hypoxia and respiratory distress. Per NH staff, the patient began having sudden onset respiratory distress around 11 AM prior to arrival and was found to be hypoxic to the 80s at the time. The patient was placed on nasal cannula by EMS with improvement.   In the ED, vitals notable for , TMax 38.4C, /60, SpO2 97% on 6L NC. Labs remarkable for WBC 20, hgb 9.4, hsTropT 89 --> 83, K 3.1 and mild LFT elevation. CT C/A/P without PE, but revealing large right hilar mass obstructing the right mainstem bronchus, along with bilateral pulmonary, judy and hepatic metastases. The patient received 2.5L IVF, vanc/zosyn, and IV tylenol.    Hospital Course:  Cardiology consulted for perioperative risk stratification as pulm plans on doing possible bronch with re-stenting tomorrow AM.  Patient is awake, alert, on 6L NC laying supine. Says difficulty breathing is not worse laying down vs. sitting up. No chest pain whatsoever. No STEPHANIE   Cre 0.77; hstrop 89 > 83   ECG reviewed, with RBBB and atrial bigeminy  ID following, on antibiotics  REceived vitamin K for INR reversal   Palliative consulted, for pain management    PMHx:   Lung cancer  HTN (hypertension)  DM2 (diabetes mellitus, type 2)  COPD without exacerbation  Hypothyroidism  HFrEF (heart failure with reduced ejection fraction)  HLD (hyperlipidemia)  Depression with anxiety    PSHx:   H/O aortic valve replacement    Allergies:  morphine (Unknown)    Current Medications:   acetaminophen     Tablet .. 650 milliGRAM(s) Oral every 6 hours PRN  albuterol/ipratropium for Nebulization 3 milliLiter(s) Nebulizer every 6 hours  atorvastatin 80 milliGRAM(s) Oral at bedtime  dapagliflozin 10 milliGRAM(s) Oral every 24 hours  dextrose 5%. 1000 milliLiter(s) IV Continuous <Continuous>  dextrose 5%. 1000 milliLiter(s) IV Continuous <Continuous>  dextrose 50% Injectable 25 Gram(s) IV Push once  dextrose 50% Injectable 25 Gram(s) IV Push once  dextrose 50% Injectable 12.5 Gram(s) IV Push once  dextrose Oral Gel 15 Gram(s) Oral once PRN  glucagon  Injectable 1 milliGRAM(s) IntraMuscular once  hydrochlorothiazide 25 milliGRAM(s) Oral daily  HYDROmorphone  Injectable 0.2 milliGRAM(s) IV Push every 4 hours PRN  insulin lispro (ADMELOG) corrective regimen sliding scale   SubCutaneous three times a day before meals  insulin lispro (ADMELOG) corrective regimen sliding scale   SubCutaneous at bedtime  levothyroxine 25 MICROGram(s) Oral daily  metoprolol tartrate 100 milliGRAM(s) Oral daily  pantoprazole    Tablet 40 milliGRAM(s) Oral before breakfast  phytonadione  IVPB 10 milliGRAM(s) IV Intermittent once  piperacillin/tazobactam IVPB.. 3.375 Gram(s) IV Intermittent every 8 hours  polyethylene glycol 3350 17 Gram(s) Oral daily  potassium chloride  10 mEq/100 mL IVPB 10 milliEquivalent(s) IV Intermittent every 1 hour  sacubitril 24 mG/valsartan 26 mG 1 Tablet(s) Oral two times a day  sodium chloride 3%  Inhalation 4 milliLiter(s) Inhalation every 12 hours    FAMILY HISTORY:  Family history unknown    REVIEW OF SYSTEMS:  CONSTITUTIONAL: No weakness, fevers or chills  EYES/ENT: No visual changes;  No dysphagia  NECK: No pain or stiffness  RESPIRATORY: No cough, wheezing, hemoptysis; No shortness of breath  CARDIOVASCULAR: No chest pain or palpitations; No lower extremity edema  GASTROINTESTINAL: No abdominal or epigastric pain. No nausea, vomiting, or hematemesis; No diarrhea or constipation. No melena or hematochezia.  BACK: No back pain  GENITOURINARY: No dysuria, frequency or hematuria  NEUROLOGICAL: No numbness or weakness  SKIN: No itching, burning, rashes, or lesions   All other review of systems is negative unless indicated above.    Physical Exam:  T(F): 97.4 (07-13), Max: 97.9 (07-12)  HR: 90 (07-13) (90 - 108)  BP: 155/76 (07-13) (139/72 - 160/64)  RR: 19 (07-13)  SpO2: 97% (07-13)  GENERAL: Chronically ill appearing   HEAD:  Atraumatic, Normocephalic  ENT: EOMI, PERRLA, conjunctiva and sclera clear, Neck supple, No JVD, moist mucosa  CHEST/LUNG: Clear to auscultation bilaterally anteriorly  BACK: No spinal tenderness  HEART: Regular rate and rhythm; No murmurs, rubs, or gallops. +RACHEL   ABDOMEN: Soft, Nontender, Nondistended; Bowel sounds present  EXTREMITIES:  No clubbing, cyanosis, or edema  PSYCH: Nl behavior, nl affect  NEUROLOGY: AAOx3, non-focal, cranial nerves intact  SKIN: Normal color, No rashes or lesions    ECG: RBBB, atrial bigeminy     Labs: Personally reviewed                        9.8    24.51 )-----------( 420      ( 13 Jul 2023 06:06 )             30.8     07-13    142  |  108<H>  |  24<H>  ----------------------------<  141<H>  3.1<L>   |  19<L>  |  0.77    Ca    12.1<H>      13 Jul 2023 06:06  Phos  1.2     07-13  Mg     2.10     07-13    TPro  6.0  /  Alb  2.4<L>  /  TBili  1.6<H>  /  DBili  x   /  AST  67<H>  /  ALT  25  /  AlkPhos  390<H>  07-13    PT/INR - ( 13 Jul 2023 02:45 )   PT: 68.1 sec;   INR: 5.77 ratio         PTT - ( 13 Jul 2023 02:45 )  PTT:44.1 sec    CARDIAC MARKERS ( 12 Jul 2023 17:00 )  83 ng/L / x     / x     / x     / x     / 2.1 ng/mL  CARDIAC MARKERS ( 12 Jul 2023 14:30 )  89 ng/L / x     / x     / x     / x     / x

## 2023-07-13 NOTE — DIETITIAN INITIAL EVALUATION ADULT - PROBLEM SELECTOR PLAN 7
Patient on orals at home: metformin 500 mg BID and Farxiga 10 mg qd  - glucose on BMP within range thus far    Plan:  > monitor FS  > low dose ISS  > can add basal/bolus if needed

## 2023-07-13 NOTE — PROGRESS NOTE ADULT - PROBLEM SELECTOR PLAN 3
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, CT head at the time with meningioma (could not undergo MR for further visualization given sternotomy wires were not MR compatible)  - has not yet started any tx    Plan:  > onc consult in AM  > hold home dilaudid and fentanyl for now given encephalopathy, appears comfortable, restart as needed Patient with recently diagnosed lung cancer  - extensive smoking history, recently quit  - recent admission 6/2023 at [facility]: underwent bronchoscopy with biopsy, lung pathology consistent with 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 Hernandez. Low dose IV dilaudid PRN for pain

## 2023-07-13 NOTE — PROVIDER CONTACT NOTE (CRITICAL VALUE NOTIFICATION) - ACTION/TREATMENT ORDERED:
Deshaun Churchill (TEAMS) notified. No recommendations made at this time. Will continue to monitor. Safety maintained.

## 2023-07-13 NOTE — CONSULT NOTE ADULT - ASSESSMENT
73 year old Female with R sided lung cancer with metastasis to lung and liver, mechanical aortic valve on warfarin, ?HFrEF, T2DM, COPD, HTN, hypothyroidism, HLD, and depression who presented on 7/12 from [place] for hypoxia and respiratory distress.    Febrile to 101.2 F  On 6 L NC   Leukocytosis 20--24K   Elevated cuca 1.4-->1.6  Elevated alk phos 397  AST 62    Workup:  -CT angio C/A/P: No pulmonary embolism to the segmental level. Findings suspicious for metastatic lung cancer. Large right hilar mass obstructing the right mainstem bronchus, as further described above, with bilateral pulmonary, judy and hepatic metastases. Indeterminate bilateral adrenal thickening.  -RVP neg     -UA (7/12): WBC 11, neg bacteria, neg LE and Nitrite     Antimicrobials:  Vanc 7/12  Zosyn 7/12-->    #Fever, leukocytosis, acute hypoxic resp failure   #R sided lung cancer with metastasis to lung and liver    Recommendations:     PT TO BE SEEN. PRELIM NOTE  PENDING RECS. PLEASE WAIT FOR FINAL RECS AFTER DISCUSSION WITH ATTENDING#                   73 year old Female with R sided lung cancer with metastasis to lung and liver, mechanical aortic valve on warfarin, ?HFrEF, T2DM, COPD, HTN, hypothyroidism, HLD, and depression who presented on 7/12 from [place] for hypoxia and respiratory distress.    Febrile to 101.2 F  On 6 L NC   Leukocytosis 20--24K   Elevated cuca 1.4-->1.6  Elevated alk phos 397  AST 62    Workup:  -CT angio C/A/P: No pulmonary embolism to the segmental level. Findings suspicious for metastatic lung cancer. Large right hilar mass obstructing the right mainstem bronchus, as further described above, with bilateral pulmonary, judy and hepatic metastases. Indeterminate bilateral adrenal thickening.  -RVP neg     -UA (7/12): WBC 11, neg bacteria, neg LE and Nitrite     Antimicrobials:  Vanc 7/12  Zosyn 7/12-->    #Fever, leukocytosis, acute hypoxic resp failure, obstructive R lung mass, post obstructive pneumonia Vs worsening lung mass and metastasis   #Recent [facility] hospitalization with endobronchial stent   #R sided lung cancer with metastasis to lung and liver    Recommendations:   Continue Zosyn 3.375 g IV q 8hrs   F/up MRSA PCR   Send sputum Cx if feasible  F/up blood Cx   Pulm eval   Monitor respiratory status, currently on 6 L NC    Seen and discussed with Dr Mika Piña MD, PGY4  ID fellow  [employer] Teams Preferred  After 5pm/weekends call [phone]

## 2023-07-13 NOTE — DIETITIAN INITIAL EVALUATION ADULT - PERTINENT LABORATORY DATA
07-13    142  |  108<H>  |  24<H>  ----------------------------<  141<H>  3.1<L>   |  19<L>  |  0.77    Ca    12.1<H>      13 Jul 2023 06:06  Phos  1.2     07-13  Mg     2.10     07-13    TPro  6.0  /  Alb  2.4<L>  /  TBili  1.6<H>  /  DBili  x   /  AST  67<H>  /  ALT  25  /  AlkPhos  390<H>  07-13  POCT Blood Glucose.: 124 mg/dL (07-13-23 @ 11:06)  A1C with Estimated Average Glucose Result: 6.4 % (07-13-23 @ 06:06)

## 2023-07-13 NOTE — CHART NOTE - NSCHARTNOTEFT_GEN_A_CORE
This report was requested by: Melissa Hernandez | Reference #: [id number]    Practitioner Count: 3  Pharmacy Count: 1  Current Opioid Prescriptions: 3  Current Benzodiazepine Prescriptions: 0  Current Stimulant Prescriptions: 0      Patient Demographic Information (PDI)       PDI	First Name	Last Name	Birth Date	Gender	Street Address	City	State	Zip Code  KADE España	1950	Female	[id number]	[facility] [place] [facility]	[address]    Prescription Information      PDI Filter:    PDI	My Rx	Current Rx	Drug Type	Rx Written	Rx Dispensed	Drug	Quantity	Days Supply	Prescriber Name	Prescriber SANDY #	Payment Method	Dispenser  A	N	Y	O	06/30/2023	06/30/2023	fentanyl 12 mcg/hr patch	10	30	Marlen Chester	[id number]	Cash	[facility]  A	N	Y	O	06/30/2023	06/30/2023	fentanyl 25 mcg/hr patch	10	30	Marlen Chester	[id number]	Cash	[facility]  A	N	Y	O	06/19/2023	06/28/2023	hydromorphone 5 mg/5 ml soln	360ml	30	Khadijah Williamson MD	[id number]	[place]	[facility]  A	N	Y		06/19/2023	06/19/2023	dronabinol 2.5 mg capsule	60	30	Khadijah Williamson MD	[id number]	Cash	[facility]  A	N	N	O	06/16/2023	06/16/2023	fentanyl 12 mcg/hr patch	5	15	Hanny Fuentes	[id number]	Cash	[facility]  A	N	N	O	06/16/2023	06/16/2023	fentanyl 25 mcg/hr patch	5	15	Hanny Fuentes	[id number]	Cash	[facility]  A	N	N	O	06/16/2023	06/16/2023	hydromorphone 5 mg/5 ml soln	112ml	14	Hanny Fuentes	[id number]	[place]	[facility]

## 2023-07-13 NOTE — CONSULT NOTE ADULT - PROBLEM SELECTOR RECOMMENDATION 4
Per [facility], patient was on fentanyl patch 12mcg q72 hours at home with dilaudid solution 5mg prn   > Case discussed with Dr. Last who spoke to pt's daughter. 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 In hospital, patient received 0.25mg IV dilaudid x1, without adverse reactions.   > Will start 0.2mg IV dilaudid q4h prn severe pain   > Bowel regimen while on opioids- dulcolax suppository 10mg qd prn as patient NPO  > narcan prn

## 2023-07-13 NOTE — CONSULT NOTE ADULT - CONSULT REASON
pneumonia
symptom management
Lung cancer, metastatic
endobronchial obstruction
Perioperative risk stratification

## 2023-07-13 NOTE — PROGRESS NOTE ADULT - PROBLEM SELECTOR PLAN 1
Patient with tachycardia, tachypnea, leukocytosis, fever, in the setting of likely post obstructive PNA  - s/p vanc and zosyn in the ED  - s/p 2.5 L IVF   - patient with recent admission at [facility] for AHRF due to post obstructive PNA, tx with IV levaquin  - likely obstructive PNA from large R hilar mass obstructing the mainstem bronchus  - per review of [facility] records, patient underwent bronchoscopy with biopsy and endobronchial stent placement in 6/2023    Plan:  > continue with IV zosyn, anticipate 7 day course pending intervention (7/12 - )  > ID consult in AM  > follow up UA, UCx, blood cx, urine legionella, MRSA PCR  > ensure optimal hydration  > Tylenol PRN fever, mild pain Patient with tachycardia, tachypnea, leukocytosis, fever, in the setting of likely post obstructive PNA  - s/p vanc and zosyn in the ED  - patient with recent admission at [facility] for AHRF due to post obstructive PNA, tx with IV levaquin  - likely obstructive PNA from large R hilar mass obstructing the mainstem bronchus  - per review of [facility] records, patient underwent bronchoscopy with biopsy and NO endobronchial stent placement     Plan:  > continue with IV zosyn, ID recs appreciated   > follow up UA, UCx, blood cx, urine legionella, urine strep, MRSA PCR  > Plan for AHRF as below

## 2023-07-13 NOTE — PROGRESS NOTE ADULT - ASSESSMENT
73F with newly diagnosed metastatic R sided lung cancer, AVR (on Jantoven), T2DM, COPD, HTN, hypothyroidism, HF and depression who presents from [place] for respiratory distress and hypoxia, found to have sepsis and acute hypoxemic respiratory failure likely 2/2 from post-obstructive pneumonia. 73F with newly diagnosed metastatic R sided lung cancer, AVR (on Jantoven), T2DM, COPD, HTN, hypothyroidism, HFpEF and depression who presents from [place] for respiratory distress and hypoxia, found to have sepsis and acute hypoxemic respiratory failure likely 2/2 from post-obstructive pneumonia for R lung mass

## 2023-07-13 NOTE — DIETITIAN INITIAL EVALUATION ADULT - PERTINENT MEDS FT
MEDICATIONS  (STANDING):  albuterol/ipratropium for Nebulization 3 milliLiter(s) Nebulizer every 6 hours  atorvastatin 80 milliGRAM(s) Oral at bedtime  dapagliflozin 10 milliGRAM(s) Oral every 24 hours  dextrose 5%. 1000 milliLiter(s) (100 mL/Hr) IV Continuous <Continuous>  dextrose 5%. 1000 milliLiter(s) (50 mL/Hr) IV Continuous <Continuous>  dextrose 50% Injectable 25 Gram(s) IV Push once  dextrose 50% Injectable 25 Gram(s) IV Push once  dextrose 50% Injectable 12.5 Gram(s) IV Push once  glucagon  Injectable 1 milliGRAM(s) IntraMuscular once  hydrochlorothiazide 25 milliGRAM(s) Oral daily  insulin lispro (ADMELOG) corrective regimen sliding scale   SubCutaneous three times a day before meals  insulin lispro (ADMELOG) corrective regimen sliding scale   SubCutaneous at bedtime  levothyroxine 25 MICROGram(s) Oral daily  metoprolol tartrate 100 milliGRAM(s) Oral daily  pantoprazole    Tablet 40 milliGRAM(s) Oral before breakfast  piperacillin/tazobactam IVPB.. 3.375 Gram(s) IV Intermittent every 8 hours  polyethylene glycol 3350 17 Gram(s) Oral daily  potassium chloride  10 mEq/100 mL IVPB 10 milliEquivalent(s) IV Intermittent every 1 hour  sacubitril 24 mG/valsartan 26 mG 1 Tablet(s) Oral two times a day  sodium chloride 3%  Inhalation 4 milliLiter(s) Inhalation every 12 hours    MEDICATIONS  (PRN):  acetaminophen     Tablet .. 650 milliGRAM(s) Oral every 6 hours PRN Temp greater or equal to 38C (100.4F), Mild Pain (1 - 3)  dextrose Oral Gel 15 Gram(s) Oral once PRN Blood Glucose LESS THAN 70 milliGRAM(s)/deciliter

## 2023-07-13 NOTE — CONSULT NOTE ADULT - PROBLEM SELECTOR RECOMMENDATION 3
On supplemental oxygen   Patient appears very dyspneic during encounter.   Awaiting pulm recs  CT shows obstructing right mainstem bronchus- would recommend eval for pulmonary stent

## 2023-07-13 NOTE — PROGRESS NOTE ADULT - PROBLEM SELECTOR PLAN 11
DVT PPx: home warfarin  Diet: pending bedside dysphagia  Code: FULL code per last hosp at [facility] 6/2023  Dispo: pending clinical improvement DVT PPx: supratherapeutic INR   Diet: pending S/S eval for dysphagia  Code: FULL code per last hosp at [facility] 6/2023  Dispo: guarded     Communication: Spoke with patient's daughter Zhanna ([phone]). We discussed patient's clinical course at [place] and [facility]. Daughter also spoke w/ pulm team and aware for possible procedure tomorrow. Discussed treatment for pneumonia and plan for INR. Discussed we will attempt to get her meds from [facility]. Daughter very overwhelmed, emotional - support provided

## 2023-07-13 NOTE — PROGRESS NOTE ADULT - PROBLEM SELECTOR PLAN 7
Patient on orals at home: metformin 500 mg BID and Farxiga 10 mg qd  - glucose on BMP within range thus far    Plan:  > monitor FS  > low dose ISS  > can add basal/bolus if needed Patient on orals at home: metformin 500 mg BID and Farxiga 10 mg qd  - glucose on BMP within range thus far    Plan:  > monitor FS  > low dose ISS  > Hold farxiga as NPO   > can add basal/bolus if needed

## 2023-07-13 NOTE — PROGRESS NOTE ADULT - PROBLEM SELECTOR PLAN 6
Patient with hx of mechanical aortic valve replacement  - on warfarin at home  - dose appears to be 5 mg daily per [facility], however medication not listed on [facility] medication list    Plan:  > follow up INR, goal 2.5 - 3.5  > if subtherapeutic, consider starting heparin gtt given possibility of bronch Patient with hx of mechanical aortic valve replacement  - on JANTOVEN at home, per daughter she CANNOT take coumadin/warfarin. She has her own meds and it was being dispensed at [facility]  - Attempting to obtain meds from [facility] assistance appreciated. Staff to bring meds tomorrow     Plan:  > INR supratherapeutic at 5.77, plan for bronch in AM --> Given IV Vit K 10mg x 1. Check INR in AM, will give further reversal if needed vs. start on heparin gtt

## 2023-07-13 NOTE — CONSULT NOTE ADULT - SUBJECTIVE AND OBJECTIVE BOX
[facility]  Melissa Hernandez, Palliative Care Attending  Contact Info: Page [phone] (including Nights/Weekends), message on Microsoft Teams (Melissa Hernandez), or leave  at Palliative Office [phone] (non-urgent)     Date of [id number] @ 14:38  HPI: Patient is a 73 year old F with R sided lung cancer with metastasis to lung and liver, mechanical aortic valve on warfarin, ?HFrEF, T2DM, COPD, HTN, hypothyroidism, HLD, and depression who presents from [place] for hypoxia and respiratory distress. Per NH staff, the patient began having sudden onset respiratory distress around 11 AM prior to arrival and was found to be hypoxic to the 80s at the time. The patient was placed on nasal cannula by EMS with improvement. The patient cannot participate in the history given her altered mental status.    In the ED, vitals notable for , TMax 38.4C, /60, SpO2 97% on 6L NC. Labs remarkable for WBC 20, hgb 9.4, hsTropT 89 --> 83, K 3.1 and mild LFT elevation. CT C/A/P without PE, but revealing large right hilar mass obstructing the right mainstem bronchus, along with bilateral pulmonary, judy and hepatic metastases. The patient received 2.5L IVF, vanc/zosyn, and IV tylenol. (12 Jul 2023 21:16)    PERTINENT PM/SXH:   Lung cancer  HTN (hypertension)  DM2 (diabetes mellitus, type 2)  COPD without exacerbation  Hypothyroidism  HFrEF (heart failure with reduced ejection fraction)  HLD (hyperlipidemia)  Depression with anxiety    H/O aortic valve replacement    FAMILY HISTORY:  Family history unknown    Family Hx substance abuse [ ]yes [ ]no  ITEMS NOT CHECKED ARE NOT PRESENT    SOCIAL HISTORY: Per discussion with Dr. 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   Significant other/partner[ ]  Children[ x]  Spiritism/Spirituality:  Substance hx:  [ ]   Tobacco hx:  [ ]   Alcohol hx: [ ]   Home Opioid hx: see separate chart note [ ] I-Stop Reference No: [id number]  Living Situation: [ ]Home  [ ]Long term care  [x ][facility] [ ]Other    ADVANCE DIRECTIVES:    DNR/MOLST  [ ]  Living Will  [ ]   DECISION MAKER(s):   [ ] Health Care Proxy(s)  [x ] Surrogate(s)  [ ] Guardian           Name(s): Zhanna Chang (daughter) Phone Number(s): [phone]    BASELINE (I)ADL(s) (prior to admission): Per chart review, patient came from rehab.   [place]: [ ]Total  [x ] Moderate [ ]Dependent    Allergies    morphine (Unknown)    Intolerances    MEDICATIONS  (STANDING):  albuterol/ipratropium for Nebulization 3 milliLiter(s) Nebulizer every 6 hours  atorvastatin 80 milliGRAM(s) Oral at bedtime  dapagliflozin 10 milliGRAM(s) Oral every 24 hours  dextrose 5%. 1000 milliLiter(s) (100 mL/Hr) IV Continuous <Continuous>  dextrose 5%. 1000 milliLiter(s) (50 mL/Hr) IV Continuous <Continuous>  dextrose 50% Injectable 25 Gram(s) IV Push once  dextrose 50% Injectable 25 Gram(s) IV Push once  dextrose 50% Injectable 12.5 Gram(s) IV Push once  glucagon  Injectable 1 milliGRAM(s) IntraMuscular once  hydrochlorothiazide 25 milliGRAM(s) Oral daily  insulin lispro (ADMELOG) corrective regimen sliding scale   SubCutaneous three times a day before meals  insulin lispro (ADMELOG) corrective regimen sliding scale   SubCutaneous at bedtime  levothyroxine 25 MICROGram(s) Oral daily  metoprolol tartrate 100 milliGRAM(s) Oral daily  pantoprazole    Tablet 40 milliGRAM(s) Oral before breakfast  phytonadione  IVPB 10 milliGRAM(s) IV Intermittent once  piperacillin/tazobactam IVPB.. 3.375 Gram(s) IV Intermittent every 8 hours  polyethylene glycol 3350 17 Gram(s) Oral daily  potassium chloride  10 mEq/100 mL IVPB 10 milliEquivalent(s) IV Intermittent every 1 hour  sacubitril 24 mG/valsartan 26 mG 1 Tablet(s) Oral two times a day  sodium chloride 3%  Inhalation 4 milliLiter(s) Inhalation every 12 hours    MEDICATIONS  (PRN):  acetaminophen     Tablet .. 650 milliGRAM(s) Oral every 6 hours PRN Temp greater or equal to 38C (100.4F), Mild Pain (1 - 3)  dextrose Oral Gel 15 Gram(s) Oral once PRN Blood Glucose LESS THAN 70 milliGRAM(s)/deciliter    PRESENT SYMPTOMS: [ ]Unable to self-report  [ ] CPOT [ ] PAINADs [ ] RDOS  Source if other than patient:  [ ]Family   [ ]Team     Pain: [ x]yes [ ]no  QOL impact - patient writhing in bed   Location - pt points to her head, back, shoulder.   Aggravating factors - unable to elicit   Quality - unable to elicit as patient restless in bed   Radiation - unable to elicit as patient restless in bed   Timing- constant   Severity (0-10 scale): 10  Minimal acceptable level/pain goal (0-10 scale): 2    CPOT:    https://www.[email or web address]/getattachment/[email or web address]/Critical-Care-Pain-Observation-Tool-(CPOT)    Dyspnea:                           [ ]Mild [ ]Moderate [x ]Severe  Anxiety:                             [ ]Mild [ ]Moderate [ ]Severe  Fatigue:                             [ ]Mild [ ]Moderate [ ]Severe  Nausea:                             [ ]Mild [ ]Moderate [ ]Severe  Loss of appetite:              [ ]Mild [ ]Moderate [ ]Severe  Constipation:                    [ ]Mild [ ]Moderate [ ]Severe    Other Symptoms:   [ x]All other review of systems negative     PCSSQ[Palliative Care Spiritual Screening Question]   Severity (0-10):  Chaplaincy Referral: [ ] yes [ ] refused [ ] following [x ] deferred     Caregiver [place]? : [ ] yes [ ] no [x ] Deferred [ ] Declined             Social work referral [ ] Patient & Family Centered Care Referral [ ]  Anticipatory Grief present?:  [ ] yes [ ] no  [x ] Deferred                  Social work referral [ ] Patient & Family Centered Care Referral [ ]    PHYSICAL EXAM:  Vital Signs Last 24 Hrs  T(C): 36.3 (13 Jul 2023 09:26), Max: 38.4 (12 Jul 2023 14:54)  T(F): 97.4 (13 Jul 2023 09:26), Max: 101.2 (12 Jul 2023 14:54)  HR: 90 (13 Jul 2023 09:44) (90 - 115)  BP: 155/76 (13 Jul 2023 09:26) (129/60 - 160/64)  BP(mean): 81 (12 Jul 2023 14:54) (81 - 81)  RR: 19 (13 Jul 2023 09:26) (18 - 22)  SpO2: 97% (13 Jul 2023 09:26) (96% - 97%)    Parameters below as of 13 Jul 2023 09:26  Patient On (Oxygen Delivery Method): nasal cannula     I&O's Summary    GENERAL: [ ]Cachexia    [x ]Alert  [ x]Oriented x 1-2 (knows self, states she is in [place])   [ ]Lethargic  [ ]Unarousable  [x ]Verbal  [ ]Non-Verbal  Behavioral:   [ ] Anxiety  [ ] Delirium [ ] Agitation [x ] Other- restless in bed, pushing hospital bed buttons   HEENT:  [ ]Normal   [x ]Dry mouth   [ ]ET Tube/Trach  [ ]Oral lesions  PULMONARY:   [ ]Clear [ x]Tachypnea  [ ]Audible excessive secretions   [ ]Rhonchi        [ ]Right [ ]Left [ ]Bilateral  [ ]Crackles        [ ]Right [ ]Left [ ]Bilateral  [ ]Wheezing     [ ]Right [ ]Left [ ]Bilateral  [ ]Diminished breath sounds [ ]right [ ]left [ ]bilateral  CARDIOVASCULAR:    [ ]Regular [ ]Irregular [ x]Tachy  [ ]Deion [ ]Murmur [ ]Other  GASTROINTESTINAL:  [x ]Soft  [ ]Distended   [ ]+BS  [ ]Non tender [ ]Tender  [ ]Other [ ]PEG [ ]OGT/ NGT  Last BM: 7/13  GENITOURINARY:  [ ]Normal [x ] Incontinent   [ ]Oliguria/Anuria   [ ]Phan  MUSCULOSKELETAL:   [ ]Normal   [ x]Weakness  [ ]Bed/Wheelchair bound [ ]Edema  NEUROLOGIC:   [ ]No focal deficits  [ ]Cognitive impairment  [ ]Dysphagia [ ]Dysarthria [ ]Paresis [x ]Other   SKIN: Please see flowsheets   [ ]Normal  [ ]Rash  [ ]Other  [ ]Pressure ulcer(s)       Present on admission [ ]y [ ]n    CRITICAL CARE:  [ ] Shock Present  [ ]Septic [ ]Cardiogenic [ ]Neurologic [ ]Hypovolemic  [ ]  Vasopressors [ ]  Inotropes   [ ]Respiratory failure present [ ]Mechanical ventilation [ ]Non-invasive ventilatory support [ ]High flow    [ ]Acute  [ ]Chronic [ ]Hypoxic  [ ]Hypercarbic [ ]Other  [ ]Other organ failure     LABS:                        9.8    24.51 )-----------( 420      ( 13 Jul 2023 06:06 )             30.8   07-13    142  |  108<H>  |  24<H>  ----------------------------<  141<H>  3.1<L>   |  19<L>  |  0.77    Ca    12.1<H>      13 Jul 2023 06:06  Phos  1.2     07-13  Mg     2.10     07-13    TPro  6.0  /  Alb  2.4<L>  /  TBili  1.6<H>  /  DBili  x   /  AST  67<H>  /  ALT  25  /  AlkPhos  390<H>  07-13  PT/INR - ( 13 Jul 2023 02:45 )   PT: 68.1 sec;   INR: 5.77 ratio         PTT - ( 13 Jul 2023 02:45 )  PTT:44.1 sec    Urinalysis Basic - ( 13 Jul 2023 06:06 )    Color: x / Appearance: x / SG: x / pH: x  Gluc: 141 mg/dL / Ketone: x  / Bili: x / Urobili: x   Blood: x / Protein: x / Nitrite: x   Leuk Esterase: x / RBC: x / WBC x   Sq Epi: x / Non Sq Epi: x / Bacteria: x      RADIOLOGY & ADDITIONAL STUDIES: < from: CT Abdomen and Pelvis w/ IV Cont (07.12.23 @ 17:31) >  IMPRESSION:  No pulmonary embolism to the segmental level.    Findings suspicious for metastatic lung cancer. Large right hilar mass   obstructing the right mainstem bronchus, as further described above, with   bilateral pulmonary, judy and hepatic metastases. Indeterminate   bilateral adrenal thickening.    Heterogeneous multinodular thyroid.    < end of copied text >      PROTEIN CALORIE MALNUTRITION PRESENT: [ ]mild [ ]moderate [ ]severe [ ]underweight [ ]morbid obesity  https://www.andeal.org/vault/2440/web/files/ONC/Table_Clinical%20Characteristics%20to%20Document%20Malnutrition-White%20JV%20et%20al%202012.pdf    Height (cm): 157.5 (07-13-23 @ 06:08)  Weight (kg): 72.8 (07-13-23 @ 06:08)  BMI (kg/m2): 29.3 (07-13-23 @ 06:08)    [ ]PPSV2 < or = to 30% [ ]significant weight loss  [ ]poor nutritional intake  [ ]anasarca[ ]Artificial Nutrition      Other REFERRALS:  [ ]Hospice  [ ]Child Life  [ ]Social Work  [ ]Case management [ ]Holistic Therapy

## 2023-07-13 NOTE — DIETITIAN INITIAL EVALUATION ADULT - REASON FOR ADMISSION
Pneumonia due to infectious organism  Patient is a 73 year old F with R sided lung cancer with metastasis to lung and liver, mechanical aortic valve on warfarin, ?HFrEF, T2DM, COPD, HTN, hypothyroidism, HLD, and depression who presents from [place] for hypoxia and respiratory distress.

## 2023-07-13 NOTE — PATIENT PROFILE ADULT - NSPRESCRALCAMT_GEN_A_NUR
Prep Survey      Responses   [facility] patient discharged from?  [facility]   Is LACE score < 7 ?  No   Emergency Room discharge w/ pulse ox?  No   Eligibility  [facility]   Date of Admission  02/03/21   Date of Discharge  02/06/21   Discharge Disposition  Home or Self Care   Discharge diagnosis  Atrial fibrillation with rapid ventricular response,    Diabetes mellitus type 2 with hyperglycemia and neuropathy   Does the patient have one of the following disease processes/diagnoses(primary or secondary)?  Other   Does the patient have Home health ordered?  No   Is there a DME ordered?  No   Prep survey completed?  Yes          Talia Rosas RN        
Patient AXO2. Unable to obtain information.

## 2023-07-13 NOTE — CONSULT NOTE ADULT - SUBJECTIVE AND OBJECTIVE BOX
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 Per NH staff, the patient began having sudden onset respiratory distress around 11 AM prior to arrival and was found to be hypoxic to the 80s at the time. The patient was placed on nasal cannula by EMS with improvement. The patient cannot participate in the history given her altered mental status.      ID was consulted for further recommendations.     REVIEW OF SYSTEMS  [  ] ROS unobtainable because:    [  ] All other systems negative except as noted below    Constitutional:  [ ] fever [ ] chills  [ ] weight loss  [ ]night sweat  [ ]poor appetite/PO intake [ ]fatigue   Skin:  [ ] rash [ ] phlebitis	  Eyes: [ ] icterus [ ] pain  [ ] discharge	  ENMT: [ ] sore throat  [ ] thrush [ ] ulcers [ ] exudates [ ]anosmia  Respiratory: [ ] dyspnea [ ] hemoptysis [ ] cough [ ] sputum	  Cardiovascular:  [ ] chest pain [ ] palpitations [ ] edema	  Gastrointestinal:  [ ] nausea [ ] vomiting [ ] diarrhea [ ] constipation [ ] pain	  Genitourinary:  [ ] dysuria [ ] frequency [ ] hematuria [ ] discharge [ ] flank pain  [ ] incontinence  Musculoskeletal:  [ ] myalgias [ ] arthralgias [ ] arthritis  [ ] back pain  Neurological:  [ ] headache [ ] weakness [ ] seizures  [ ] confusion/altered mental status    prior hospital charts reviewed [V]  primary team notes reviewed [V]  other consultant notes reviewed [V]    PAST MEDICAL & SURGICAL HISTORY:  Lung cancer    HTN (hypertension)    DM2 (diabetes mellitus, type 2)    COPD without exacerbation    Hypothyroidism    HFrEF (heart failure with reduced ejection fraction)    HLD (hyperlipidemia)    Depression with anxiety    H/O aortic valve replacement    SOCIAL HISTORY:  - Denied smoking/vaping/alcohol/recreational drug use    FAMILY HISTORY:  Family history unknown    Allergies  morphine (Unknown)    ANTIMICROBIALS:  piperacillin/tazobactam IVPB.. 3.375 every 8 hours    ANTIMICROBIALS (past 90 days):  MEDICATIONS  (STANDING):  piperacillin/tazobactam IVPB..   25 mL/Hr IV Intermittent (07-13-23 @ 10:38)   25 mL/Hr IV Intermittent (07-13-23 @ 01:40)    piperacillin/tazobactam IVPB...   200 mL/Hr IV Intermittent (07-12-23 @ 17:02)    vancomycin  IVPB.   250 mL/Hr IV Intermittent (07-12-23 @ 19:40)    OTHER MEDS:   MEDICATIONS  (STANDING):  acetaminophen     Tablet .. 650 every 6 hours PRN  albuterol/ipratropium for Nebulization 3 every 6 hours  atorvastatin 80 at bedtime  dapagliflozin 10 every 24 hours  dextrose 50% Injectable 25 once  dextrose 50% Injectable 25 once  dextrose 50% Injectable 12.5 once  dextrose Oral Gel 15 once PRN  glucagon  Injectable 1 once  hydrochlorothiazide 25 daily  insulin lispro (ADMELOG) corrective regimen sliding scale  at bedtime  insulin lispro (ADMELOG) corrective regimen sliding scale  three times a day before meals  levothyroxine 25 daily  metoprolol tartrate 100 daily  pantoprazole    Tablet 40 before breakfast  polyethylene glycol 3350 17 daily  sacubitril 24 mG/valsartan 26 mG 1 two times a day  sodium chloride 3%  Inhalation 4 every 12 hours      VITALS:  Vital Signs Last 24 Hrs  T(F): 97.4 (07-13-23 @ 09:26), Max: 101.2 (07-12-23 @ 14:54)    Vital Signs Last 24 Hrs  HR: 90 (07-13-23 @ 09:44) (90 - 115)  BP: 155/76 (07-13-23 @ 09:26) (129/60 - 160/64)  RR: 19 (07-13-23 @ 09:26)  SpO2: 97% (07-13-23 @ 09:26) (96% - 97%)  Wt(kg): --    EXAM:  Physical Exam:  Constitutional:  well preserved, comfortable  Head/Eyes: no icterus, PERRL, EOMI  ENT:  supple; no thrush  LUNGS:  CTA  CVS:  normal S1, S2, no murmur  Abd:  soft, non-tender; non-distended  Ext:  no edema  Vascular:  IV site no erythema tenderness or discharge  MSK:  joints without swelling  Neuro: AAO X 3, non- focal    Labs:                        9.8    24.51 )-----------( 420      ( 13 Jul 2023 06:06 )             30.8     07-13    142  |  108<H>  |  24<H>  ----------------------------<  141<H>  3.1<L>   |  19<L>  |  0.77    Ca    12.1<H>      13 Jul 2023 06:06  Phos  1.2     07-13  Mg     2.10     07-13    TPro  6.0  /  Alb  2.4<L>  /  TBili  1.6<H>  /  DBili  x   /  AST  67<H>  /  ALT  25  /  AlkPhos  390<H>  07-13    WBC Trend:  WBC Count: 24.51 (07-13-23 @ 06:06)  WBC Count: 20.05 (07-12-23 @ 14:30)    Auto Neutrophil #: 17.87 K/uL (07-12-23 @ 14:30)    Creatine Trend:  Creatinine: 0.77 (07-13)  Creatinine: 0.94 (07-12)    Liver Biochemical Testing Trend:  Alanine Aminotransferase (ALT/SGPT): 25 (07-13)  Alanine Aminotransferase (ALT/SGPT): 24 (07-12)  Aspartate Aminotransferase (AST/SGOT): 67 (07-13-23 @ 06:06)  Aspartate Aminotransferase (AST/SGOT): 62 (07-12-23 @ 14:30)  Bilirubin Total: 1.6 (07-13)  Bilirubin Total: 1.4 (07-12)    Trend LDH    Auto Eosinophil %: 0.0 % (07-12-23 @ 14:30)    Urinalysis Basic - ( 13 Jul 2023 06:06 )    Color: x / Appearance: x / SG: x / pH: x  Gluc: 141 mg/dL / Ketone: x  / Bili: x / Urobili: x   Blood: x / Protein: x / Nitrite: x   Leuk Esterase: x / RBC: x / WBC x   Sq Epi: x / Non Sq Epi: x / Bacteria: x    MICROBIOLOGY:    Rapid RVP Result: NotDetec (07-12 @ 14:30)  Troponin T, High Sensitivity Result: 83 (07-12)  Troponin T, High Sensitivity Result: 89 (07-12)    Blood Gas Venous - Lactate: 2.8 (07-12 @ 19:30)  Blood Gas Venous - Lactate: 3.6 (07-12 @ 17:00)  Blood Gas Venous - Lactate: 4.2 (07-12 @ 14:30)    A1C with Estimated Average Glucose Result: 6.4 % (07-13-23 @ 06:06)    RADIOLOGY:  imaging below personally reviewed  < from: CT Angio Chest PE Protocol w/ IV Cont (07.12.23 @ 17:31) >  IMPRESSION:  No pulmonary embolism to the segmental level.    Findings suspicious for metastatic lung cancer. Large right hilar mass   obstructing the right mainstem bronchus, as further described above, with   bilateral pulmonary, judy and hepatic metastases. Indeterminate   bilateral adrenal thickening.    Heterogeneous multinodular thyroid.    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 Per NH staff, the patient began having sudden onset respiratory distress around 11 AM prior to arrival and was found to be hypoxic to the 80s at the time. The patient was placed on nasal cannula by EMS with improvement. The patient cannot participate in the history given her altered mental status.    ID was consulted for further recommendations.     REVIEW OF SYSTEMS  [X] ROS unobtainable because:  AMS   [  ] All other systems negative except as noted below    Constitutional:  [ ] fever [ ] chills  [ ] weight loss  [ ]night sweat  [ ]poor appetite/PO intake [ ]fatigue   Skin:  [ ] rash [ ] phlebitis	  Eyes: [ ] icterus [ ] pain  [ ] discharge	  ENMT: [ ] sore throat  [ ] thrush [ ] ulcers [ ] exudates [ ]anosmia  Respiratory: [ ] dyspnea [ ] hemoptysis [ ] cough [ ] sputum	  Cardiovascular:  [ ] chest pain [ ] palpitations [ ] edema	  Gastrointestinal:  [ ] nausea [ ] vomiting [ ] diarrhea [ ] constipation [ ] pain	  Genitourinary:  [ ] dysuria [ ] frequency [ ] hematuria [ ] discharge [ ] flank pain  [ ] incontinence  Musculoskeletal:  [ ] myalgias [ ] arthralgias [ ] arthritis  [ ] back pain  Neurological:  [ ] headache [ ] weakness [ ] seizures  [ ] confusion/altered mental status    prior hospital charts reviewed [V]  primary team notes reviewed [V]  other consultant notes reviewed [V]    PAST MEDICAL & SURGICAL HISTORY:  Lung cancer    HTN (hypertension)    DM2 (diabetes mellitus, type 2)    COPD without exacerbation    Hypothyroidism    HFrEF (heart failure with reduced ejection fraction)    HLD (hyperlipidemia)    Depression with anxiety    H/O aortic valve replacement    SOCIAL HISTORY:  - Denied smoking/vaping/alcohol/recreational drug use    FAMILY HISTORY:  Family history unknown    Allergies  morphine (Unknown)    ANTIMICROBIALS:  piperacillin/tazobactam IVPB.. 3.375 every 8 hours    ANTIMICROBIALS (past 90 days):  MEDICATIONS  (STANDING):  piperacillin/tazobactam IVPB..   25 mL/Hr IV Intermittent (07-13-23 @ 10:38)   25 mL/Hr IV Intermittent (07-13-23 @ 01:40)    piperacillin/tazobactam IVPB...   200 mL/Hr IV Intermittent (07-12-23 @ 17:02)    vancomycin  IVPB.   250 mL/Hr IV Intermittent (07-12-23 @ 19:40)    OTHER MEDS:   MEDICATIONS  (STANDING):  acetaminophen     Tablet .. 650 every 6 hours PRN  albuterol/ipratropium for Nebulization 3 every 6 hours  atorvastatin 80 at bedtime  dapagliflozin 10 every 24 hours  dextrose 50% Injectable 25 once  dextrose 50% Injectable 25 once  dextrose 50% Injectable 12.5 once  dextrose Oral Gel 15 once PRN  glucagon  Injectable 1 once  hydrochlorothiazide 25 daily  insulin lispro (ADMELOG) corrective regimen sliding scale  at bedtime  insulin lispro (ADMELOG) corrective regimen sliding scale  three times a day before meals  levothyroxine 25 daily  metoprolol tartrate 100 daily  pantoprazole    Tablet 40 before breakfast  polyethylene glycol 3350 17 daily  sacubitril 24 mG/valsartan 26 mG 1 two times a day  sodium chloride 3%  Inhalation 4 every 12 hours      VITALS:  Vital Signs Last 24 Hrs  T(F): 97.4 (07-13-23 @ 09:26), Max: 101.2 (07-12-23 @ 14:54)    Vital Signs Last 24 Hrs  HR: 90 (07-13-23 @ 09:44) (90 - 115)  BP: 155/76 (07-13-23 @ 09:26) (129/60 - 160/64)  RR: 19 (07-13-23 @ 09:26)  SpO2: 97% (07-13-23 @ 09:26) (96% - 97%)  Wt(kg): --    EXAM:  Physical Exam:  Constitutional:  agitated  Head/Eyes: no icterus, PERRL, EOMI  ENT:  supple; no thrush  LUNGS:  R sided decrease breath sounds  CVS:  normal S1, S2, no murmur  Abd:  soft, non-tender; non-distended  Ext:  no edema  Vascular:  IV site no erythema tenderness or discharge, b/l upper extremity ecchymosis   MSK:  joints without swelling  Neuro: AAO X 2    Labs:                        9.8    24.51 )-----------( 420      ( 13 Jul 2023 06:06 )             30.8     07-13    142  |  108<H>  |  24<H>  ----------------------------<  141<H>  3.1<L>   |  19<L>  |  0.77    Ca    12.1<H>      13 Jul 2023 06:06  Phos  1.2     07-13  Mg     2.10     07-13    TPro  6.0  /  Alb  2.4<L>  /  TBili  1.6<H>  /  DBili  x   /  AST  67<H>  /  ALT  25  /  AlkPhos  390<H>  07-13    WBC Trend:  WBC Count: 24.51 (07-13-23 @ 06:06)  WBC Count: 20.05 (07-12-23 @ 14:30)    Auto Neutrophil #: 17.87 K/uL (07-12-23 @ 14:30)    Creatine Trend:  Creatinine: 0.77 (07-13)  Creatinine: 0.94 (07-12)    Liver Biochemical Testing Trend:  Alanine Aminotransferase (ALT/SGPT): 25 (07-13)  Alanine Aminotransferase (ALT/SGPT): 24 (07-12)  Aspartate Aminotransferase (AST/SGOT): 67 (07-13-23 @ 06:06)  Aspartate Aminotransferase (AST/SGOT): 62 (07-12-23 @ 14:30)  Bilirubin Total: 1.6 (07-13)  Bilirubin Total: 1.4 (07-12)    Trend LDH    Auto Eosinophil %: 0.0 % (07-12-23 @ 14:30)    Urinalysis Basic - ( 13 Jul 2023 06:06 )    Color: x / Appearance: x / SG: x / pH: x  Gluc: 141 mg/dL / Ketone: x  / Bili: x / Urobili: x   Blood: x / Protein: x / Nitrite: x   Leuk Esterase: x / RBC: x / WBC x   Sq Epi: x / Non Sq Epi: x / Bacteria: x    MICROBIOLOGY:    Rapid RVP Result: NotDetec (07-12 @ 14:30)  Troponin T, High Sensitivity Result: 83 (07-12)  Troponin T, High Sensitivity Result: 89 (07-12)    Blood Gas Venous - Lactate: 2.8 (07-12 @ 19:30)  Blood Gas Venous - Lactate: 3.6 (07-12 @ 17:00)  Blood Gas Venous - Lactate: 4.2 (07-12 @ 14:30)    A1C with Estimated Average Glucose Result: 6.4 % (07-13-23 @ 06:06)    RADIOLOGY:  imaging below personally reviewed  < from: CT Angio Chest PE Protocol w/ IV Cont (07.12.23 @ 17:31) >  IMPRESSION:  No pulmonary embolism to the segmental level.    Findings suspicious for metastatic lung cancer. Large right hilar mass   obstructing the right mainstem bronchus, as further described above, with   bilateral pulmonary, judy and hepatic metastases. Indeterminate   bilateral adrenal thickening.    Heterogeneous multinodular thyroid.    --- End of Report ---    < end of copied text >

## 2023-07-13 NOTE — DIETITIAN INITIAL EVALUATION ADULT - OTHER INFO
A&Ox2; RN at bedside. States patient has a lot of anxiety this AM. Pt is NPO awaiting bedside swallow eval; she failed nursing screen. No reported GI issues (nausea/vomiting/diarrhea/constipation.) NKFA. No wt history available in HIE.

## 2023-07-13 NOTE — PATIENT PROFILE ADULT - NSPROGENPREVTRANSF_GEN_A_NUR
Patient AXO2. Unable to obtain information. Consent (Ear)/Introductory Paragraph: The rationale for Mohs was explained to the patient and consent was obtained. The risks, benefits and alternatives to therapy were discussed in detail. Specifically, the risks of ear deformity, infection, scarring, bleeding, prolonged wound healing, incomplete removal, allergy to anesthesia, nerve injury and recurrence were addressed. Prior to the procedure, the treatment site was clearly identified and confirmed by the patient. All components of Universal Protocol/PAUSE Rule completed.

## 2023-07-13 NOTE — PROGRESS NOTE ADULT - PROBLEM SELECTOR PLAN 5
Patient without documented history of HFrEF; however on GDMT per Outpt medication review  - on Farxiga, Entresto, vericiguat, metoprolol at home    Plan:  > continue with home meds, kidney function stable  > repeat TTE given unclear baseline and diagnosis Patient without documented history of HFrEF; however on GDMT per Outpt medication review  - on Farxiga, Entresto, vericiguat, metoprolol at home    Plan:  > repeat TTE showing grossly normal LVSF

## 2023-07-13 NOTE — CONSULT NOTE ADULT - ASSESSMENT
a/p  72 y/o woman with Metastatic lung carcinoma liver mets. Presents with hypoxia.    1) onc- NSCLC. liver mets. Poor PS.   not a ctx candidate at this time.  can consider outpt rx , if there is improvement.  -will touch base with patients dtr in am  - palliative care following  -prognosis is poor    2) AMS. likely related to hypercalcemia. malignancy and infection could also be playing a role  - mgmt of hypercalcemia as per med.  consider aggressive hydration/diuresis, steroids, bisphosphonates, calcitonin  - consider mri of brain to r/o mets    3) heme- anemia, leukocytosis. likely related to malig.  monitor cbc and manage supportively.  check iron stores, b12, folate, ldh, hapto.     4)coagulopathy- h/o rx with coumadin. also has liver mets.  vit k given.  f/u coags    5) Resp- mgmt as per pulm    6)

## 2023-07-13 NOTE — CONSULT NOTE ADULT - TIME BILLING
time spent in review of laboratory data, radiology images and results, discussion with primary team/patient, and monitoring for potential decompensation. Interventions performed as documented above. In addition, time also spent to risks and benefits of the procedure, alternative procedures, diagnostic and therapeutic outcomes as well as further management plan. Complex patient requiring services. Interventional Pulmonology services provided to the patient were separate from general pulmonary service due to complexity of issues and interventions required. Time spent separate from time spent doing any procedures. Time also spent in discussion with IP team from [facility].

## 2023-07-13 NOTE — DIETITIAN INITIAL EVALUATION ADULT - PROBLEM SELECTOR PLAN 1
Patient with tachycardia, tachypnea, leukocytosis, fever, in the setting of likely post obstructive PNA  - s/p vanc and zosyn in the ED  - s/p 2.5 L IVF   - patient with recent admission at [facility] for AHRF due to post obstructive PNA, tx with IV levaquin  - likely obstructive PNA from large R hilar mass obstructing the mainstem bronchus  - per review of [facility] records, patient underwent bronchoscopy with biopsy and endobronchial stent placement in 6/2023    Plan:  > continue with IV zosyn, anticipate 7 day course pending intervention (7/12 - )  > ID consult in AM  > follow up UA, UCx, blood cx, urine legionella, MRSA PCR  > ensure optimal hydration  > Tylenol PRN fever, mild pain

## 2023-07-13 NOTE — PROGRESS NOTE ADULT - SUBJECTIVE AND OBJECTIVE BOX
[facility] Division of Hospital Medicine  Naomi Last MD  Available on [employer] TEAMS    SUBJECTIVE / OVERNIGHT EVENTS: Patient seen and examined. Reports that she is short of breath. Confused and asking me to speak to her daughter. Per RN, failed dysphagia screening and has been feeling anxious.       MEDICATIONS  (STANDING):  albuterol/ipratropium for Nebulization 3 milliLiter(s) Nebulizer every 6 hours  atorvastatin 80 milliGRAM(s) Oral at bedtime  dextrose 5%. 1000 milliLiter(s) (50 mL/Hr) IV Continuous <Continuous>  dextrose 5%. 1000 milliLiter(s) (100 mL/Hr) IV Continuous <Continuous>  dextrose 50% Injectable 12.5 Gram(s) IV Push once  dextrose 50% Injectable 25 Gram(s) IV Push once  dextrose 50% Injectable 25 Gram(s) IV Push once  glucagon  Injectable 1 milliGRAM(s) IntraMuscular once  insulin lispro (ADMELOG) corrective regimen sliding scale   SubCutaneous three times a day before meals  insulin lispro (ADMELOG) corrective regimen sliding scale   SubCutaneous at bedtime  levothyroxine 25 MICROGram(s) Oral daily  metoprolol tartrate 100 milliGRAM(s) Oral daily  pantoprazole    Tablet 40 milliGRAM(s) Oral before breakfast  piperacillin/tazobactam IVPB.. 3.375 Gram(s) IV Intermittent every 8 hours  polyethylene glycol 3350 17 Gram(s) Oral daily  sacubitril 24 mG/valsartan 26 mG 1 Tablet(s) Oral two times a day  sodium chloride 3%  Inhalation 4 milliLiter(s) Inhalation every 6 hours    MEDICATIONS  (PRN):  acetaminophen     Tablet .. 650 milliGRAM(s) Oral every 6 hours PRN Temp greater or equal to 38C (100.4F), Mild Pain (1 - 3)  dextrose Oral Gel 15 Gram(s) Oral once PRN Blood Glucose LESS THAN 70 milliGRAM(s)/deciliter  HYDROmorphone  Injectable 0.2 milliGRAM(s) IV Push every 4 hours PRN Severe Pain (7 - 10)      I&O's Summary      PHYSICAL EXAM:  Vital Signs Last 24 Hrs  T(C): 36.4 (13 Jul 2023 17:00), Max: 36.6 (12 Jul 2023 22:35)  T(F): 97.5 (13 Jul 2023 17:00), Max: 97.9 (12 Jul 2023 22:35)  HR: 88 (13 Jul 2023 17:01) (88 - 108)  BP: 160/70 (13 Jul 2023 17:00) (139/72 - 160/70)  BP(mean): --  RR: 19 (13 Jul 2023 17:00) (18 - 22)  SpO2: 100% (13 Jul 2023 17:00) (96% - 100%)    Parameters below as of 13 Jul 2023 17:00  Patient On (Oxygen Delivery Method): nasal cannula      CONSTITUTIONAL: ill appearing female in moderate resp distress   EYES: Conjunctiva and sclera clear  ENMT: dry oral mucosa  RESPIRATORY: Increased resp effort; + decreased BS on R side. Scattered course breath sounds   CARDIOVASCULAR: +systolic murmur; no LE edema   ABDOMEN: Soft, Nontender, Nondistended; Bowel sounds present  MUSCULOSKELETAL:  No clubbing or cyanosis of digits; No joint swelling or tenderness to palpation  PSYCH: AAOx1-2    LABS:                        9.8    24.51 )-----------( 420      ( 13 Jul 2023 06:06 )             30.8     07-13    142  |  108<H>  |  24<H>  ----------------------------<  141<H>  3.1<L>   |  19<L>  |  0.77    Ca    12.1<H>      13 Jul 2023 06:06  Phos  1.2     07-13  Mg     2.10     07-13    TPro  6.0  /  Alb  2.4<L>  /  TBili  1.6<H>  /  DBili  x   /  AST  67<H>  /  ALT  25  /  AlkPhos  390<H>  07-13    PT/INR - ( 13 Jul 2023 02:45 )   PT: 68.1 sec;   INR: 5.77 ratio         PTT - ( 13 Jul 2023 02:45 )  PTT:44.1 sec  CARDIAC MARKERS ( 12 Jul 2023 17:00 )  x     / x     / x     / x     / 2.1 ng/mL      Urinalysis Basic - ( 13 Jul 2023 06:06 )    Color: x / Appearance: x / SG: x / pH: x  Gluc: 141 mg/dL / Ketone: x  / Bili: x / Urobili: x   Blood: x / Protein: x / Nitrite: x   Leuk Esterase: x / RBC: x / WBC x   Sq Epi: x / Non Sq Epi: x / Bacteria: x        SARS-CoV-2: NotDetec (12 Jul 2023 14:30)      RADIOLOGY & ADDITIONAL TESTS:  New Results Reviewed Today:   New Imaging Personally Reviewed Today:  New Electrocardiogram Personally Reviewed Today:  Prior or Outpatient Records Reviewed Today:    COMMUNICATION:  Care Discussed with Consultants/Other Providers and Details of Discussion:  Discussions with Patient/Family:  PCP Communication:

## 2023-07-13 NOTE — CONSULT NOTE ADULT - SUBJECTIVE AND OBJECTIVE BOX
ANATOLY MERRITT  MRN-[id number]    Patient is a 73y old  Female who presents with a chief complaint of Respiratory distress (13 Jul 2023 17:24)    HPI:  Patient is a 73 year old F with R sided lung cancer with metastasis to lung and liver, mechanical aortic valve on warfarin, ?HFrEF, T2DM, COPD, HTN, hypothyroidism, HLD, and depression who presents from [place] for hypoxia and respiratory distress. Per NH staff, the patient began having sudden onset respiratory distress around 11 AM prior to arrival and was found to be hypoxic to the 80s at the time. The patient was placed on nasal cannula by EMS with improvement. The patient cannot participate in the history given her altered mental status.    In the ED, vitals notable for , TMax 38.4C, /60, SpO2 97% on 6L NC. Labs remarkable for WBC 20, hgb 9.4, hsTropT 89 --> 83, K 3.1 and mild LFT elevation. CT C/A/P without PE, but revealing large right hilar mass obstructing the right mainstem bronchus, along with bilateral pulmonary, judy and hepatic metastases. The patient received 2.5L IVF, vanc/zosyn, and IV tylenol. (12 Jul 2023 21:16)      PAST MEDICAL & SURGICAL HISTORY:  Lung cancer  HTN (hypertension)  DM2 (diabetes mellitus, type 2)  COPD without exacerbation  Hypothyroidism  HFrEF (heart failure with reduced ejection fraction)  HLD (hyperlipidemia)  Depression with anxiety  H/O aortic valve replacement    Current Meds  MEDICATIONS  (STANDING):  albuterol/ipratropium for Nebulization 3 milliLiter(s) Nebulizer every 6 hours  atorvastatin 80 milliGRAM(s) Oral at bedtime  dextrose 5%. 1000 milliLiter(s) (50 mL/Hr) IV Continuous <Continuous>  dextrose 5%. 1000 milliLiter(s) (100 mL/Hr) IV Continuous <Continuous>  dextrose 50% Injectable 12.5 Gram(s) IV Push once  dextrose 50% Injectable 25 Gram(s) IV Push once  dextrose 50% Injectable 25 Gram(s) IV Push once  glucagon  Injectable 1 milliGRAM(s) IntraMuscular once  insulin lispro (ADMELOG) corrective regimen sliding scale   SubCutaneous three times a day before meals  insulin lispro (ADMELOG) corrective regimen sliding scale   SubCutaneous at bedtime  levothyroxine 25 MICROGram(s) Oral daily  metoprolol tartrate 100 milliGRAM(s) Oral daily  pantoprazole    Tablet 40 milliGRAM(s) Oral before breakfast  piperacillin/tazobactam IVPB.. 3.375 Gram(s) IV Intermittent every 8 hours  polyethylene glycol 3350 17 Gram(s) Oral daily  sacubitril 24 mG/valsartan 26 mG 1 Tablet(s) Oral two times a day  sodium chloride 3%  Inhalation 4 milliLiter(s) Inhalation every 6 hours    MEDICATIONS  (PRN):  acetaminophen     Tablet .. 650 milliGRAM(s) Oral every 6 hours PRN Temp greater or equal to 38C (100.4F), Mild Pain (1 - 3)  dextrose Oral Gel 15 Gram(s) Oral once PRN Blood Glucose LESS THAN 70 milliGRAM(s)/deciliter  HYDROmorphone  Injectable 0.2 milliGRAM(s) IV Push every 4 hours PRN Severe Pain (7 - 10)    Allergies    morphine (Unknown)    Social History  former smoker    FAMILY HISTORY:  Family history unknown      REVIEW OF SYSTEMS    Pt is confused, unable to participate in ROS      Vital Signs Last 24 Hrs  T(C): 36.4 (13 Jul 2023 17:00), Max: 36.6 (12 Jul 2023 22:35)  T(F): 97.5 (13 Jul 2023 17:00), Max: 97.9 (12 Jul 2023 22:35)  HR: 108 (13 Jul 2023 19:26) (88 - 108)  BP: 151/62 (13 Jul 2023 19:26) (139/72 - 160/70)  BP(mean): 86 (13 Jul 2023 19:26) (86 - 86)  RR: 16 (13 Jul 2023 19:26) (16 - 22)  SpO2: 98% (13 Jul 2023 19:26) (96% - 100%)    Parameters below as of 13 Jul 2023 19:26  Patient On (Oxygen Delivery Method): nasal cannula  O2 Flow (L/min): 6      PHYSICAL EXAM:    Constitutional: NAD    Eyes: PERRLA EOMI, anicteric sclera    Heent :No oral sores, no pharyngeal injection. moist mucosa.    Neck: supple, no jvd, no LAD    Respiratory: CTA b/l     Cardiovascular: s1s2, no m/g/r    Gastrointestinal: soft, nt, nd, + BS    Extremities: no c/c/e    Skin: no rash on exposed skin    Lymph Nodes: no lymphadenopathy.      Lab  CBC Full  -  ( 13 Jul 2023 06:06 )  WBC Count : 24.51 K/uL  RBC Count : 3.84 M/uL  Hemoglobin : 9.8 g/dL  Hematocrit : 30.8 %  Platelet Count - Automated : 420 K/uL  Mean Cell Volume : 80.2 fL  Mean Cell Hemoglobin : 25.5 pg  Mean Cell Hemoglobin Concentration : 31.8 gm/dL  Auto Neutrophil # : x  Auto Lymphocyte # : x  Auto Monocyte # : x  Auto Eosinophil # : x  Auto Basophil # : x  Auto Neutrophil % : x  Auto Lymphocyte % : x  Auto Monocyte % : x  Auto Eosinophil % : x  Auto Basophil % : x    07-13    142  |  108<H>  |  24<H>  ----------------------------<  141<H>  3.1<L>   |  19<L>  |  0.77    Ca    12.1<H>      13 Jul 2023 06:06  Phos  1.2     07-13  Mg     2.10     07-13    TPro  6.0  /  Alb  2.4<L>  /  TBili  1.6<H>  /  DBili  x   /  AST  67<H>  /  ALT  25  /  AlkPhos  390<H>  07-13    PT/INR - ( 13 Jul 2023 02:45 )   PT: 68.1 sec;   INR: 5.77 ratio         PTT - ( 13 Jul 2023 02:45 )  PTT:44.1 sec    Rad:    Assessment/Plan       "Called pt- no current symptoms. Advised if he develops afib symptoms again to go to ED. He agreed. Will keep on schedule    Additional Information    Negative: Passed out (i.e., fainted, collapsed and was not responding)    Negative: Shock suspected (e.g., cold/pale/clammy skin, too weak to stand, low BP, rapid pulse)    Negative: Difficult to awaken or acting confused (e.g., disoriented, slurred speech)    Negative: Visible sweat on face or sweat dripping down face    Negative: Unable to walk, or can only walk with assistance (e.g., requires support)    Negative: Received SHOCK from implantable cardiac defibrillator and has persisting symptoms (i.e., palpitations, lightheadedness)    Negative: Sounds like a life-threatening emergency to the triager    Negative: Chest pain    Negative: Difficulty breathing    Negative: Dizziness, lightheadedness, or weakness    Negative: Heart beating very rapidly (e.g., > 140 / minute) and present now (EXCEPTION: during exercise)    Negative: Heart beating very slowly (e.g., < 50 / minute) (EXCEPTION: athlete)    Negative: New or worsened shortness of breath with activity (dyspnea on exertion)    Negative: Patient sounds very sick or weak to the triager    Negative: Wearing a \"holter monitor\" or \"cardiac event monitor\"    Negative: Received SHOCK from implantable cardiac defibrillator (and now feels well)    Negative: Heart beating very rapidly (e.g., > 140 / minute) and not present now (EXCEPTION: during exercise)    Negative: Skipped or extra beat(s) and increases with exercise or exertion    Negative: Skipped or extra beat(s) and occurs 4 or more times per minute    Negative: History of heart disease (i.e., heart attack, bypass surgery, angina, angioplasty)    Negative: Age > 60 years    Negative: Taking water pill (i.e., diuretic) or heart medication (e.g., digoxin)    Negative: Patient wants to be seen    Negative: History of hyperthyroidism or taking thyroid medication    " "Negative: Known or suspected substance abuse (e.g., cocaine, alcohol abuse)    Negative: Palpitations and no improvement after following Care Advice    Answer Assessment - Initial Assessment Questions  1. DESCRIPTION: \"Please describe your heart rate or heart beat that you are having\" (e.g., fast/slow, regular/irregular, skipped or extra beats, \"palpitations\")      Fluttering feeling in the chest  2. ONSET: \"When did it start?\" (Minutes, hours or days)       10/17/20  3. DURATION: \"How long does it last\" (e.g., seconds, minutes, hours)      4 hours  4. PATTERN \"Does it come and go, or has it been constant since it started?\"  \"Does it get worse with exertion?\"   \"Are you feeling it now?\"      Come and go. Happens when laying in bed. Not feeling not  5. TAP: \"Using your hand, can you tap out what you are feeling on a chair or table in front of you, so that I can hear?\" (Note: not all patients can do this)          6. HEART RATE: \"Can you tell me your heart rate?\" \"How many beats in 15 seconds?\"  (Note: not all patients can do this)        60  7. RECURRENT SYMPTOM: \"Have you ever had this before?\" If so, ask: \"When was the last time?\" and \"What happened that time?\"       A year ago when he was stressed  8. CAUSE: \"What do you think is causing the palpitations?\"      afib or sleep apnea  9. CARDIAC HISTORY: \"Do you have any history of heart disease?\" (e.g., heart attack, angina, bypass surgery, angioplasty, arrhythmia)       no  10. OTHER SYMPTOMS: \"Do you have any other symptoms?\" (e.g., dizziness, chest pain, sweating, difficulty breathing)        dizzy  11. PREGNANCY: \"Is there any chance you are pregnant?\" \"When was your last menstrual period?\"        no    Protocols used: HEART RATE AND HEARTBEAT [id number]    "

## 2023-07-13 NOTE — PROGRESS NOTE ADULT - PROBLEM SELECTOR PLAN 2
Likely in the setting of post-obstructive PNA and worsening lung cancer  - found to be hypxoxic  - now stable on 6L NC, SpO2 94-96%    Plan:  > abx as above  > aggressive pulmonary toilet: chest PT, duo-neb, hypersal  > HOB elevation  > aspiration precautions  > pulm consult in AM; patient may benefit from further stenting Likely in the setting of large R hilar mass obstructing the mainstem bronchus seen on CTA Chest, negative for PE   - found to be hypoxic, on 6L NC    Plan:  > abx as above  > aggressive pulmonary toilet: chest PT, duo-neb, hypersal  > HOB elevation  > aspiration precautions  > Discussed with pulm fellow Dr. Mendoza, plan for possible urgent bronchoscopy tomorrow. Per collateral from pulm, no stent was placed at [facility]. Will give Vit K 10mg IVPB x1 for INR reversal. Discussed need for cardiology clearance prior to bronch - cards consulted, recs appreciated   > low threshold for MICU eval if worsening resp status

## 2023-07-13 NOTE — CONSULT NOTE ADULT - ATTENDING COMMENTS
Date of service 7/13    Patient with progressive lung cancer with malignant central airway obstruction s/p rigid bronchoscopy 6 weeks ago at [facility] with debulking of tumor and of RMB and BI. Now with progressive hypoxemic respiratory failure with worsening obstruction in the BI again with distal atelectasis. Discussed case with [facility] team. Systemic therapy has not been started. For symptom relief, may need endobronchial intervention. Reverse INR to be able to perform procedure safely. NPO after midnight. IP team to follow.
There are no active cardiac conditions. There is no evidence of ACS, heart failure, or obstructive valvular heart disease. The patient may safely proceed with the planned procedure, including the use of sedation or GET as clinically indicated.  No further cardiac testing is required.
74 yo woman with right lung cancer s/p bronch, stent and biopsy at outside hospital now sent from [place] with respiratory failure, WBC 24K  Obstructive right lung mass   Post obstructive pneumonia   RVP neg   blood cultures testing   Agree with c/w zosyn   check nasal MSSA/ MRSA PCR   will follow

## 2023-07-13 NOTE — CONSULT NOTE ADULT - PROBLEM SELECTOR RECOMMENDATION 2
f/u blood cultures x2, urine cx   On zosyn   Appreciate ID recs  Pt answers questions after extensive questioning. She is aware of her lung cancer but unable to elicit further information.

## 2023-07-13 NOTE — DIETITIAN INITIAL EVALUATION ADULT - PROBLEM SELECTOR PLAN 3
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

## 2023-07-13 NOTE — DIETITIAN INITIAL EVALUATION ADULT - PROBLEM SELECTOR PLAN 6
Patient with hx of mechanical aortic valve replacement  - on warfarin at home  - dose appears to be 5 mg daily per Surescripts, however medication not listed on [facility] medication list    Plan:  > follow up INR, goal 2.5 - 3.5  > if subtherapeutic, consider starting heparin gtt given possibility of bronch

## 2023-07-14 DIAGNOSIS — Z71.89 OTHER SPECIFIED COUNSELING: ICD-10-CM

## 2023-07-14 DIAGNOSIS — I47.1 SUPRAVENTRICULAR TACHYCARDIA: ICD-10-CM

## 2023-07-14 DIAGNOSIS — E83.52 HYPERCALCEMIA: ICD-10-CM

## 2023-07-14 LAB
ALBUMIN SERPL ELPH-MCNC: 2.5 G/DL — LOW (ref 3.3–5)
ALP SERPL-CCNC: 340 U/L — HIGH (ref 40–120)
ALT FLD-CCNC: 29 U/L — SIGNIFICANT CHANGE UP (ref 4–33)
ANION GAP SERPL CALC-SCNC: 22 MMOL/L — HIGH (ref 7–14)
ANISOCYTOSIS BLD QL: SLIGHT — SIGNIFICANT CHANGE UP
APTT BLD: 35.7 SEC — SIGNIFICANT CHANGE UP (ref 27–36.3)
AST SERPL-CCNC: 70 U/L — HIGH (ref 4–32)
BASOPHILS # BLD AUTO: 0 K/UL — SIGNIFICANT CHANGE UP (ref 0–0.2)
BASOPHILS NFR BLD AUTO: 0 % — SIGNIFICANT CHANGE UP (ref 0–2)
BILIRUB SERPL-MCNC: 2 MG/DL — HIGH (ref 0.2–1.2)
BLD GP AB SCN SERPL QL: NEGATIVE — SIGNIFICANT CHANGE UP
BUN SERPL-MCNC: 18 MG/DL — SIGNIFICANT CHANGE UP (ref 7–23)
CALCIUM SERPL-MCNC: 12.6 MG/DL — HIGH (ref 8.4–10.5)
CHLORIDE SERPL-SCNC: 107 MMOL/L — SIGNIFICANT CHANGE UP (ref 98–107)
CO2 SERPL-SCNC: 20 MMOL/L — LOW (ref 22–31)
CREAT SERPL-MCNC: 0.69 MG/DL — SIGNIFICANT CHANGE UP (ref 0.5–1.3)
EGFR: 92 ML/MIN/1.73M2 — SIGNIFICANT CHANGE UP
EOSINOPHIL # BLD AUTO: 0 K/UL — SIGNIFICANT CHANGE UP (ref 0–0.5)
EOSINOPHIL NFR BLD AUTO: 0 % — SIGNIFICANT CHANGE UP (ref 0–6)
FERRITIN SERPL-MCNC: 1049 NG/ML — HIGH (ref 13–330)
GLUCOSE BLDC GLUCOMTR-MCNC: 129 MG/DL — HIGH (ref 70–99)
GLUCOSE BLDC GLUCOMTR-MCNC: 156 MG/DL — HIGH (ref 70–99)
GLUCOSE BLDC GLUCOMTR-MCNC: 162 MG/DL — HIGH (ref 70–99)
GLUCOSE BLDC GLUCOMTR-MCNC: 165 MG/DL — HIGH (ref 70–99)
GLUCOSE BLDC GLUCOMTR-MCNC: 181 MG/DL — HIGH (ref 70–99)
GLUCOSE SERPL-MCNC: 186 MG/DL — HIGH (ref 70–99)
HAPTOGLOB SERPL-MCNC: 308 MG/DL — HIGH (ref 34–200)
HCT VFR BLD CALC: 28.7 % — LOW (ref 34.5–45)
HGB BLD-MCNC: 9.2 G/DL — LOW (ref 11.5–15.5)
HYPOCHROMIA BLD QL: SLIGHT — SIGNIFICANT CHANGE UP
IANC: 23.65 K/UL — HIGH (ref 1.8–7.4)
INR BLD: 1.89 RATIO — HIGH (ref 0.88–1.16)
IRON SATN MFR SERPL: 26 % — SIGNIFICANT CHANGE UP (ref 14–50)
IRON SATN MFR SERPL: 28 UG/DL — LOW (ref 30–160)
LDH SERPL L TO P-CCNC: 307 U/L — HIGH (ref 135–225)
LYMPHOCYTES # BLD AUTO: 0.93 K/UL — LOW (ref 1–3.3)
LYMPHOCYTES # BLD AUTO: 3.5 % — LOW (ref 13–44)
MAGNESIUM SERPL-MCNC: 2.1 MG/DL — SIGNIFICANT CHANGE UP (ref 1.6–2.6)
MCHC RBC-ENTMCNC: 25.1 PG — LOW (ref 27–34)
MCHC RBC-ENTMCNC: 32.1 GM/DL — SIGNIFICANT CHANGE UP (ref 32–36)
MCV RBC AUTO: 78.4 FL — LOW (ref 80–100)
MICROCYTES BLD QL: SLIGHT — SIGNIFICANT CHANGE UP
MONOCYTES # BLD AUTO: 0.69 K/UL — SIGNIFICANT CHANGE UP (ref 0–0.9)
MONOCYTES NFR BLD AUTO: 2.6 % — SIGNIFICANT CHANGE UP (ref 2–14)
MRSA PCR RESULT.: SIGNIFICANT CHANGE UP
NEUTROPHILS # BLD AUTO: 24.97 K/UL — HIGH (ref 1.8–7.4)
NEUTROPHILS NFR BLD AUTO: 93.9 % — HIGH (ref 43–77)
PHOSPHATE SERPL-MCNC: 1.8 MG/DL — LOW (ref 2.5–4.5)
PLAT MORPH BLD: NORMAL — SIGNIFICANT CHANGE UP
PLATELET # BLD AUTO: 401 K/UL — HIGH (ref 150–400)
PLATELET COUNT - ESTIMATE: NORMAL — SIGNIFICANT CHANGE UP
POIKILOCYTOSIS BLD QL AUTO: SLIGHT — SIGNIFICANT CHANGE UP
POLYCHROMASIA BLD QL SMEAR: SLIGHT — SIGNIFICANT CHANGE UP
POTASSIUM SERPL-MCNC: 3 MMOL/L — LOW (ref 3.5–5.3)
POTASSIUM SERPL-SCNC: 3 MMOL/L — LOW (ref 3.5–5.3)
PROT SERPL-MCNC: 5.8 G/DL — LOW (ref 6–8.3)
PROTHROM AB SERPL-ACNC: 22.1 SEC — HIGH (ref 10.5–13.4)
RBC # BLD: 3.66 M/UL — LOW (ref 3.8–5.2)
RBC # FLD: 16.1 % — HIGH (ref 10.3–14.5)
RBC BLD AUTO: ABNORMAL
RETICS #: 59.2 K/UL — SIGNIFICANT CHANGE UP (ref 25–125)
RETICS/RBC NFR: 1.6 % — SIGNIFICANT CHANGE UP (ref 0.5–2.5)
RH IG SCN BLD-IMP: NEGATIVE — SIGNIFICANT CHANGE UP
S AUREUS DNA NOSE QL NAA+PROBE: SIGNIFICANT CHANGE UP
SODIUM SERPL-SCNC: 149 MMOL/L — HIGH (ref 135–145)
TARGETS BLD QL SMEAR: SLIGHT — SIGNIFICANT CHANGE UP
TIBC SERPL-MCNC: 106 UG/DL — LOW (ref 220–430)
UIBC SERPL-MCNC: 78 UG/DL — LOW (ref 110–370)
WBC # BLD: 26.59 K/UL — HIGH (ref 3.8–10.5)
WBC # FLD AUTO: 26.59 K/UL — HIGH (ref 3.8–10.5)

## 2023-07-14 PROCEDURE — 99498 ADVNCD CARE PLAN ADDL 30 MIN: CPT

## 2023-07-14 PROCEDURE — 99232 SBSQ HOSP IP/OBS MODERATE 35: CPT

## 2023-07-14 PROCEDURE — 99497 ADVNCD CARE PLAN 30 MIN: CPT | Mod: 25

## 2023-07-14 PROCEDURE — 99233 SBSQ HOSP IP/OBS HIGH 50: CPT | Mod: 25

## 2023-07-14 PROCEDURE — 99233 SBSQ HOSP IP/OBS HIGH 50: CPT | Mod: GC

## 2023-07-14 PROCEDURE — 93010 ELECTROCARDIOGRAM REPORT: CPT

## 2023-07-14 PROCEDURE — 99233 SBSQ HOSP IP/OBS HIGH 50: CPT

## 2023-07-14 RX ORDER — HEPARIN SODIUM 5000 [USP'U]/ML
6000 INJECTION INTRAVENOUS; SUBCUTANEOUS EVERY 6 HOURS
Refills: 0 | Status: DISCONTINUED | OUTPATIENT
Start: 2023-07-14 | End: 2023-07-14

## 2023-07-14 RX ORDER — POTASSIUM CHLORIDE 20 MEQ
10 PACKET (EA) ORAL
Refills: 0 | Status: COMPLETED | OUTPATIENT
Start: 2023-07-14 | End: 2023-07-14

## 2023-07-14 RX ORDER — SODIUM CHLORIDE 9 MG/ML
1000 INJECTION, SOLUTION INTRAVENOUS
Refills: 0 | Status: DISCONTINUED | OUTPATIENT
Start: 2023-07-14 | End: 2023-07-14

## 2023-07-14 RX ORDER — POTASSIUM PHOSPHATE, MONOBASIC POTASSIUM PHOSPHATE, DIBASIC 236; 224 MG/ML; MG/ML
30 INJECTION, SOLUTION INTRAVENOUS ONCE
Refills: 0 | Status: COMPLETED | OUTPATIENT
Start: 2023-07-14 | End: 2023-07-14

## 2023-07-14 RX ORDER — PANTOPRAZOLE SODIUM 20 MG/1
40 TABLET, DELAYED RELEASE ORAL DAILY
Refills: 0 | Status: DISCONTINUED | OUTPATIENT
Start: 2023-07-14 | End: 2023-07-20

## 2023-07-14 RX ORDER — HEPARIN SODIUM 5000 [USP'U]/ML
3000 INJECTION INTRAVENOUS; SUBCUTANEOUS EVERY 6 HOURS
Refills: 0 | Status: DISCONTINUED | OUTPATIENT
Start: 2023-07-14 | End: 2023-07-14

## 2023-07-14 RX ORDER — SODIUM CHLORIDE 9 MG/ML
1000 INJECTION, SOLUTION INTRAVENOUS
Refills: 0 | Status: DISCONTINUED | OUTPATIENT
Start: 2023-07-14 | End: 2023-07-16

## 2023-07-14 RX ORDER — SODIUM CHLORIDE 9 MG/ML
4 INJECTION INTRAMUSCULAR; INTRAVENOUS; SUBCUTANEOUS EVERY 12 HOURS
Refills: 0 | Status: DISCONTINUED | OUTPATIENT
Start: 2023-07-14 | End: 2023-07-18

## 2023-07-14 RX ORDER — CALCITONIN SALMON 200 [IU]/ML
290 INJECTION, SOLUTION INTRAMUSCULAR EVERY 12 HOURS
Refills: 0 | Status: COMPLETED | OUTPATIENT
Start: 2023-07-14 | End: 2023-07-15

## 2023-07-14 RX ORDER — PAMIDRONATE DISODIUM 9 MG/ML
90 INJECTION, SOLUTION INTRAVENOUS ONCE
Refills: 0 | Status: COMPLETED | OUTPATIENT
Start: 2023-07-14 | End: 2023-07-14

## 2023-07-14 RX ORDER — METOPROLOL TARTRATE 50 MG
2.5 TABLET ORAL EVERY 6 HOURS
Refills: 0 | Status: DISCONTINUED | OUTPATIENT
Start: 2023-07-14 | End: 2023-07-15

## 2023-07-14 RX ORDER — METOPROLOL TARTRATE 50 MG
5 TABLET ORAL EVERY 6 HOURS
Refills: 0 | Status: DISCONTINUED | OUTPATIENT
Start: 2023-07-14 | End: 2023-07-14

## 2023-07-14 RX ORDER — LEVOTHYROXINE SODIUM 125 MCG
20 TABLET ORAL AT BEDTIME
Refills: 0 | Status: DISCONTINUED | OUTPATIENT
Start: 2023-07-14 | End: 2023-07-20

## 2023-07-14 RX ORDER — HEPARIN SODIUM 5000 [USP'U]/ML
INJECTION INTRAVENOUS; SUBCUTANEOUS
Qty: 25000 | Refills: 0 | Status: DISCONTINUED | OUTPATIENT
Start: 2023-07-14 | End: 2023-07-14

## 2023-07-14 RX ADMIN — Medication 100 MILLIEQUIVALENT(S): at 11:51

## 2023-07-14 RX ADMIN — PIPERACILLIN AND TAZOBACTAM 25 GRAM(S): 4; .5 INJECTION, POWDER, LYOPHILIZED, FOR SOLUTION INTRAVENOUS at 01:46

## 2023-07-14 RX ADMIN — Medication 5 MILLIGRAM(S): at 09:53

## 2023-07-14 RX ADMIN — Medication 1: at 12:40

## 2023-07-14 RX ADMIN — HYDROMORPHONE HYDROCHLORIDE 0.2 MILLIGRAM(S): 2 INJECTION INTRAMUSCULAR; INTRAVENOUS; SUBCUTANEOUS at 13:10

## 2023-07-14 RX ADMIN — HYDROMORPHONE HYDROCHLORIDE 0.2 MILLIGRAM(S): 2 INJECTION INTRAMUSCULAR; INTRAVENOUS; SUBCUTANEOUS at 04:52

## 2023-07-14 RX ADMIN — POTASSIUM PHOSPHATE, MONOBASIC POTASSIUM PHOSPHATE, DIBASIC 83.33 MILLIMOLE(S): 236; 224 INJECTION, SOLUTION INTRAVENOUS at 12:39

## 2023-07-14 RX ADMIN — PIPERACILLIN AND TAZOBACTAM 25 GRAM(S): 4; .5 INJECTION, POWDER, LYOPHILIZED, FOR SOLUTION INTRAVENOUS at 17:57

## 2023-07-14 RX ADMIN — PAMIDRONATE DISODIUM 65 MILLIGRAM(S): 9 INJECTION, SOLUTION INTRAVENOUS at 18:50

## 2023-07-14 RX ADMIN — PIPERACILLIN AND TAZOBACTAM 25 GRAM(S): 4; .5 INJECTION, POWDER, LYOPHILIZED, FOR SOLUTION INTRAVENOUS at 09:32

## 2023-07-14 RX ADMIN — HYDROMORPHONE HYDROCHLORIDE 0.2 MILLIGRAM(S): 2 INJECTION INTRAMUSCULAR; INTRAVENOUS; SUBCUTANEOUS at 22:35

## 2023-07-14 RX ADMIN — Medication 100 MILLIEQUIVALENT(S): at 09:33

## 2023-07-14 RX ADMIN — SODIUM CHLORIDE 75 MILLILITER(S): 9 INJECTION, SOLUTION INTRAVENOUS at 17:59

## 2023-07-14 RX ADMIN — Medication 3 MILLILITER(S): at 16:19

## 2023-07-14 RX ADMIN — Medication 2.5 MILLIGRAM(S): at 17:58

## 2023-07-14 RX ADMIN — Medication 20 MICROGRAM(S): at 21:54

## 2023-07-14 RX ADMIN — HYDROMORPHONE HYDROCHLORIDE 0.2 MILLIGRAM(S): 2 INJECTION INTRAMUSCULAR; INTRAVENOUS; SUBCUTANEOUS at 18:28

## 2023-07-14 RX ADMIN — Medication 100 MILLIEQUIVALENT(S): at 10:40

## 2023-07-14 RX ADMIN — Medication 3 MILLILITER(S): at 10:16

## 2023-07-14 RX ADMIN — Medication 3 MILLILITER(S): at 04:13

## 2023-07-14 RX ADMIN — HYDROMORPHONE HYDROCHLORIDE 0.2 MILLIGRAM(S): 2 INJECTION INTRAMUSCULAR; INTRAVENOUS; SUBCUTANEOUS at 22:00

## 2023-07-14 RX ADMIN — CALCITONIN SALMON 290 INTERNATIONAL UNIT(S): 200 INJECTION, SOLUTION INTRAMUSCULAR at 18:50

## 2023-07-14 RX ADMIN — SODIUM CHLORIDE 4 MILLILITER(S): 9 INJECTION INTRAMUSCULAR; INTRAVENOUS; SUBCUTANEOUS at 04:31

## 2023-07-14 RX ADMIN — Medication 3 MILLILITER(S): at 22:46

## 2023-07-14 RX ADMIN — SODIUM CHLORIDE 50 MILLILITER(S): 9 INJECTION, SOLUTION INTRAVENOUS at 09:52

## 2023-07-14 RX ADMIN — HYDROMORPHONE HYDROCHLORIDE 0.2 MILLIGRAM(S): 2 INJECTION INTRAMUSCULAR; INTRAVENOUS; SUBCUTANEOUS at 05:35

## 2023-07-14 RX ADMIN — SODIUM CHLORIDE 4 MILLILITER(S): 9 INJECTION INTRAMUSCULAR; INTRAVENOUS; SUBCUTANEOUS at 16:20

## 2023-07-14 RX ADMIN — SODIUM CHLORIDE 4 MILLILITER(S): 9 INJECTION INTRAMUSCULAR; INTRAVENOUS; SUBCUTANEOUS at 10:16

## 2023-07-14 RX ADMIN — SODIUM CHLORIDE 4 MILLILITER(S): 9 INJECTION INTRAMUSCULAR; INTRAVENOUS; SUBCUTANEOUS at 22:46

## 2023-07-14 RX ADMIN — Medication 1: at 07:31

## 2023-07-14 RX ADMIN — Medication 2.5 MILLIGRAM(S): at 12:39

## 2023-07-14 RX ADMIN — Medication 2.5 MILLIGRAM(S): at 23:28

## 2023-07-14 RX ADMIN — PANTOPRAZOLE SODIUM 40 MILLIGRAM(S): 20 TABLET, DELAYED RELEASE ORAL at 12:39

## 2023-07-14 RX ADMIN — HYDROMORPHONE HYDROCHLORIDE 0.2 MILLIGRAM(S): 2 INJECTION INTRAMUSCULAR; INTRAVENOUS; SUBCUTANEOUS at 17:58

## 2023-07-14 RX ADMIN — HYDROMORPHONE HYDROCHLORIDE 0.2 MILLIGRAM(S): 2 INJECTION INTRAMUSCULAR; INTRAVENOUS; SUBCUTANEOUS at 12:39

## 2023-07-14 NOTE — PROGRESS NOTE ADULT - PROBLEM SELECTOR PLAN 4
Patient reportedly AAOx3 at baseline, now AAOx1-2  - likely from sepsis encephalopathy vs. hypercalcemia   - continue to monitor, expect improvement with tx of infection and hypercalcemia  - delirium precautions

## 2023-07-14 NOTE — PROGRESS NOTE ADULT - SUBJECTIVE AND OBJECTIVE BOX
[facility] Division of Hospital Medicine  Naomi Last MD  Available on [employer] TEAMS    SUBJECTIVE / OVERNIGHT EVENTS: Patient seen and examined. She reports she is feeling "much better". Noted to have atrial tachycardia to 150s, given IV lopressor 5mg IVP.    MEDICATIONS  (STANDING):  albuterol/ipratropium for Nebulization 3 milliLiter(s) Nebulizer every 6 hours  atorvastatin 80 milliGRAM(s) Oral at bedtime  calcitonin Injectable 290 International Unit(s) IntraMuscular every 12 hours  dextrose 5% + sodium chloride 0.45%. 1000 milliLiter(s) (75 mL/Hr) IV Continuous <Continuous>  dextrose 5%. 1000 milliLiter(s) (50 mL/Hr) IV Continuous <Continuous>  dextrose 5%. 1000 milliLiter(s) (100 mL/Hr) IV Continuous <Continuous>  dextrose 50% Injectable 12.5 Gram(s) IV Push once  dextrose 50% Injectable 25 Gram(s) IV Push once  dextrose 50% Injectable 25 Gram(s) IV Push once  glucagon  Injectable 1 milliGRAM(s) IntraMuscular once  insulin lispro (ADMELOG) corrective regimen sliding scale   SubCutaneous at bedtime  insulin lispro (ADMELOG) corrective regimen sliding scale   SubCutaneous three times a day before meals  levothyroxine Injectable 20 MICROGram(s) IV Push at bedtime  metoprolol tartrate Injectable 2.5 milliGRAM(s) IV Push every 6 hours  pantoprazole  Injectable 40 milliGRAM(s) IV Push daily  piperacillin/tazobactam IVPB.. 3.375 Gram(s) IV Intermittent every 8 hours  polyethylene glycol 3350 17 Gram(s) Oral daily  sacubitril 24 mG/valsartan 26 mG 1 Tablet(s) Oral two times a day  sodium chloride 3%  Inhalation 4 milliLiter(s) Inhalation every 6 hours    MEDICATIONS  (PRN):  acetaminophen     Tablet .. 650 milliGRAM(s) Oral every 6 hours PRN Temp greater or equal to 38C (100.4F), Mild Pain (1 - 3)  dextrose Oral Gel 15 Gram(s) Oral once PRN Blood Glucose LESS THAN 70 milliGRAM(s)/deciliter  HYDROmorphone  Injectable 0.2 milliGRAM(s) IV Push every 4 hours PRN Severe Pain (7 - 10)      I&O's Summary      PHYSICAL EXAM:  Vital Signs Last 24 Hrs  T(C): 37.1 (14 Jul 2023 12:20), Max: 37.1 (14 Jul 2023 12:20)  T(F): 98.7 (14 Jul 2023 12:20), Max: 98.7 (14 Jul 2023 12:20)  HR: 117 (14 Jul 2023 12:20) (88 - 150)  BP: 156/79 (14 Jul 2023 12:20) (141/86 - 160/70)  BP(mean): 86 (13 Jul 2023 19:26) (86 - 86)  RR: 20 (14 Jul 2023 12:20) (16 - 20)  SpO2: 97% (14 Jul 2023 12:20) (96% - 100%)    Parameters below as of 14 Jul 2023 12:20  Patient On (Oxygen Delivery Method): nasal cannula  O2 Flow (L/min): 6    CONSTITUTIONAL: ill appearing female in moderate resp distress   EYES: Conjunctiva and sclera clear  ENMT: dry oral mucosa  RESPIRATORY: Increased resp effort; + decreased BS on R side. Scattered course breath sounds   CARDIOVASCULAR: +tachycardia, systolic murmur; no LE edema   ABDOMEN: Soft, Nontender, Nondistended; Bowel sounds present  MUSCULOSKELETAL:  No clubbing or cyanosis of digits; No joint swelling or tenderness to palpation  PSYCH: AAOx1-2    LABS:                        9.2    26.59 )-----------( 401      ( 14 Jul 2023 06:18 )             28.7     07-14    149<H>  |  107  |  18  ----------------------------<  186<H>  3.0<L>   |  20<L>  |  0.69    Ca    12.6<H>      14 Jul 2023 06:18  Phos  1.8     07-14  Mg     2.10     07-14    TPro  5.8<L>  /  Alb  2.5<L>  /  TBili  2.0<H>  /  DBili  x   /  AST  70<H>  /  ALT  29  /  AlkPhos  340<H>  07-14    PT/INR - ( 14 Jul 2023 06:18 )   PT: 22.1 sec;   INR: 1.89 ratio         PTT - ( 14 Jul 2023 06:18 )  PTT:35.7 sec  CARDIAC MARKERS ( 12 Jul 2023 17:00 )  x     / x     / x     / x     / 2.1 ng/mL      Urinalysis Basic - ( 14 Jul 2023 06:18 )    Color: x / Appearance: x / SG: x / pH: x  Gluc: 186 mg/dL / Ketone: x  / Bili: x / Urobili: x   Blood: x / Protein: x / Nitrite: x   Leuk Esterase: x / RBC: x / WBC x   Sq Epi: x / Non Sq Epi: x / Bacteria: x        Culture - Urine (collected 12 Jul 2023 22:30)  Source: Clean Catch Clean Catch (Midstream)  Final Report (13 Jul 2023 22:02):    <10,000 CFU/mL Normal Urogenital Nohelia    Culture - Blood (collected 12 Jul 2023 17:51)  Source: .Blood Blood-Peripheral  Preliminary Report (13 Jul 2023 21:01):    No growth at 24 hours    Culture - Blood (collected 12 Jul 2023 17:51)  Source: .Blood Blood-Peripheral  Preliminary Report (13 Jul 2023 21:01):    No growth at 24 hours      SARS-CoV-2: NotDetec (12 Jul 2023 14:30)      RADIOLOGY & ADDITIONAL TESTS:  New Results Reviewed Today:   New Imaging Personally Reviewed Today:  New Electrocardiogram Personally Reviewed Today:  Prior or Outpatient Records Reviewed Today:    COMMUNICATION:  Care Discussed with Consultants/Other Providers and Details of Discussion: discussed with pulm fellow, tentative plan for bronch today pending anesthesia. Discussed w/ cards fellow Dr. Billings, EKG reviewed consistent with atrial tachycardia, okay with metoprolol   Discussions with Patient/Family: please see GOC discussion from today for full details   PCP Communication:

## 2023-07-14 NOTE — PROGRESS NOTE ADULT - PROBLEM SELECTOR PLAN 7
Patient with hx of mechanical aortic valve replacement  - on JANTOVEN at home, per daughter she CANNOT take coumadin/warfarin. She has her own meds and it was being dispensed at [facility]. Obtained meds from [facility], now at bedside  - INR supratherapeutic at 5.77 on admission, s/p IV Vit K 10mg x 1, now subtherapeutic. Per discussion with pulm team, no heparin until after procedure.  - Will start on heparin gtt once cleared by pulm team

## 2023-07-14 NOTE — PROGRESS NOTE ADULT - PROBLEM SELECTOR PLAN 4
Per I-stop, patient was on fentanyl patch 12mcg q72 hours at home with dilaudid solution 5mg prn   > Case discussed with Dr. Last who spoke to pt's daughter. 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 In hospital, patient received 0.25mg IV dilaudid x1, without adverse reactions.   > Continue 0.2mg IV dilaudid q4h prn severe pain. Patient remains NPO per Swallowe eval.   > Bowel regimen while on opioids- dulcolax suppository 10mg qd prn as patient NPO  > narcan prn.

## 2023-07-14 NOTE — CHART NOTE - NSCHARTNOTEFT_GEN_A_CORE
Spoke with pulmonary fellow Dr. Mendoza who spoke with patient's daughter. Daughter would not like to proceed with comfort measures after discussion with oncology team and not pursue bronchoscopy. Spoke with patient's daughter Zhanna and confirmed decision for comfort measures, DNR/DNI. Discussed role of AC given her AVR. After discussion, decision was made to not continue heparin gtt or Jantoven as this requires frequent monitoring and blood draws and not in line with comfort. Orders changed to reflect decision.     Naomi Last MD  Hospitalist  Pager: [phone] Spoke with pulmonary fellow Dr. Mendoza who spoke with patient's daughter. Daughter would not like to proceed with comfort measures after discussion with oncology team and not pursue bronchoscopy. Spoke with patient's daughter Zhanna and confirmed decision for comfort measures, DNR/DNI. Discussed role of AC given her AVR. After discussion, decision was made to not continue heparin gtt or Jantoven as this requires frequent monitoring and blood draws and not in line with comfort. Orders changed to reflect decision. Will continue with antibiotics and calcium-lowing therapy. Discussed with palliative care also Dr. Hernandez - will address role for hospice on Monday.     Naomi Last MD  Hospitalist  Pager: [phone]

## 2023-07-14 NOTE — SWALLOW BEDSIDE ASSESSMENT ADULT - COMMENTS
As per Hospitalist note dated 7/13/23 "73F with newly diagnosed metastatic R sided lung cancer, AVR (on Jantoven), T2DM, COPD, HTN, hypothyroidism, HFpEF and depression who presents from [facility] for respiratory distress and hypoxia, found to have sepsis and acute hypoxemic respiratory failure likely 2/2 from post-obstructive pneumonia for R lung mass"    CT Chest 7/12/23 "IMPRESSION: No pulmonary embolism to the segmental level. Findings suspicious for metastatic lung cancer. Large right hilar mass obstructing the right mainstem bronchus, as further described above, with bilateral pulmonary, judy and hepatic metastases. Indeterminate bilateral adrenal thickening. Heterogeneous multinodular thyroid."    Patient visited at bedside for clinical swallow evaluation. Patient presents as awake and alert, able to follow 1-step directions given verbal cues, however limited verbalizations produced throughout assessment. Patient receiving supplemental oxygen via nasal cannula.

## 2023-07-14 NOTE — PROGRESS NOTE ADULT - SUBJECTIVE AND OBJECTIVE BOX
Follow Up:  respiratory failure     Interval History/ROS:  speaking short phrases with moderate respiratory distress      Allergies  morphine (Unknown)        ANTIMICROBIALS:  piperacillin/tazobactam IVPB.. 3.375 every 8 hours      OTHER MEDS:  acetaminophen     Tablet .. 650 milliGRAM(s) Oral every 6 hours PRN  albuterol/ipratropium for Nebulization 3 milliLiter(s) Nebulizer every 6 hours  calcitonin Injectable 290 International Unit(s) IntraMuscular every 12 hours  dextrose 5% + sodium chloride 0.45%. 1000 milliLiter(s) IV Continuous <Continuous>  guaiFENesin Oral Liquid (Sugar-Free) 100 milliGRAM(s) Oral every 6 hours PRN  HYDROmorphone  Injectable 0.2 milliGRAM(s) IV Push every 4 hours PRN  levothyroxine Injectable 20 MICROGram(s) IV Push at bedtime  metoprolol tartrate Injectable 2.5 milliGRAM(s) IV Push every 6 hours  pantoprazole  Injectable 40 milliGRAM(s) IV Push daily  polyethylene glycol 3350 17 Gram(s) Oral daily  sodium chloride 3%  Inhalation 4 milliLiter(s) Inhalation every 6 hours  sodium chloride 7% Inhalation 4 milliLiter(s) Inhalation every 12 hours      Vital Signs Last 24 Hrs  T(C): 36.4 (14 Jul 2023 17:10), Max: 37.1 (14 Jul 2023 12:20)  T(F): 97.5 (14 Jul 2023 17:10), Max: 98.7 (14 Jul 2023 12:20)  HR: 106 (14 Jul 2023 17:10) (92 - 150)  BP: 156/68 (14 Jul 2023 17:10) (141/86 - 156/79)  BP(mean): --  RR: 18 (14 Jul 2023 17:10) (17 - 20)  SpO2: 96% (14 Jul 2023 17:10) (96% - 99%)    Parameters below as of 14 Jul 2023 17:10  Patient On (Oxygen Delivery Method): nasal cannula  O2 Flow (L/min): 6      PHYSICAL EXAM:  Constitutional: non toxic moderate resp distress   Eyes: No icterus.  Oral cavity: Clear, no lesions  Neck: Supple  RS: decreased BS right nasal O2  CVS: tachy murmur+  Abdomen: Soft. No guarding/rigidity/tenderness.  Skin: petechiae arms   Neuro: Alert, unable to fully assess                             9.2    26.59 )-----------( 401      ( 14 Jul 2023 06:18 )             28.7       07-14    149<H>  |  107  |  18  ----------------------------<  186<H>  3.0<L>   |  20<L>  |  0.69    Ca    12.6<H>      14 Jul 2023 06:18  Phos  1.8     07-14  Mg     2.10     07-14    TPro  5.8<L>  /  Alb  2.5<L>  /  TBili  2.0<H>  /  DBili  x   /  AST  70<H>  /  ALT  29  /  AlkPhos  340<H>  07-14      Urinalysis Basic - ( 14 Jul 2023 06:18 )    Color: x / Appearance: x / SG: x / pH: x  Gluc: 186 mg/dL / Ketone: x  / Bili: x / Urobili: x   Blood: x / Protein: x / Nitrite: x   Leuk Esterase: x / RBC: x / WBC x   Sq Epi: x / Non Sq Epi: x / Bacteria: x        MICROBIOLOGY:  v  Clean Catch Clean Catch (Midstream)  07-12-23   <10,000 CFU/mL Normal Urogenital Nohelia  --  --      .Blood Blood-Peripheral  07-12-23   No growth at 24 hours  --  --          Rapid RVP Result: NotDetec (07-12 @ 14:30)        RADIOLOGY:    rad< from: CT Abdomen and Pelvis w/ IV Cont (07.12.23 @ 17:31) >    ACC: [id number] EXAM:  CT ABDOMEN AND PELVIS IC   ORDERED BY: CARLOS COTO     ACC: [id number] EXAM:  CT ANGIO CHEST PULM ART [facility]   ORDERED BY: HERMELINDA MEYER     PROCEDURE DATE:  07/12/2023          INTERPRETATION:  CLINICAL INFORMATION: 73-year-old female with lung   cancer, right-sided mass, significant respiratory distress and  hypoxia   and abdominal pain     COMPARISON: Chest x-ray 7/12/2023    CONTRAST/COMPLICATIONS:  IV Contrast: Omnipaque 350  90 cc administered   10 cc discarded  Oral Contrast: NONE  Complications: None reported at time of study completion    PROCEDURE:  CT Angiography of the Chest was performed followed by portal venous phase   imaging of the Abdomen and Pelvis.  Sagittal and coronal reformats were performed as well as 3D (MIP)   reconstructions.    FINDINGS:  CHEST:  LUNGS, AIRWAYS AND PLEURA: There is a large, heterogeneously enhancing   right hilar mass measuring 6.6 x 6.2 x 6.4 cm, (AP, TR, CC), extending   into and occluding the right mainstem bronchus with resulting complete   right middle and partial right lower lobe compressive atelectasis. The   mass extends into the subcarinal region and narrows the right superior   pulmonary vein and central right-sided pulmonary arteries. There are   numerousbilateral solid pulmonary metastases, few examples including 9   mm left apex series 2, image 12, 1.6 cm right apex image 18 and 9 mm left   lower lobe image 44. Small right pleural effusion.    VESSELS: No pulmonary embolism to the segmental level. Limited evaluation   of the subsegmental branches. 4.2 cm mid ascending aorta.    HEART: Heart size is normal. No pericardial effusion. Status post aortic   valve replacement. Coronary artery calcification.    MEDIASTINUM AND AUDREY: Mediastinal lymphadenopathy includes a 1.3 cm short   axis low right paratracheal node, 1.2 cm short axis AP window node as   well as a subcarinal judy mass which appears confluent with the right   hilar mass.    CHEST WALL AND LOWER NECK: Heterogeneous appearing thyroid gland. Midline   sternotomy wires    ABDOMEN AND PELVIS:  LIVER: Extensive hepatic lesions/metastases, the largest measuring 3.9 x   2.4 cm in min 8, with an internal necrotic component.  BILE DUCTS: Normal caliber.  GALLBLADDER: Contracted , pericholecystic infiltration, nonspecific. No   evidence of cholelithiasis.  SPLEEN: Within normal limits.  PANCREAS: Within normal limits.  ADRENALS: Bilateral adrenal gland thickening/nodularity, which is   concerning for metastases  KIDNEYS/URETERS: No hydronephrosis. Small right subcentimeter hypodensity   too small to characterize. Left lower pole cortical hypoattenuation,   likely reflective of a prior infarct.    BLADDER: Within normal limits. External female urinary catheter.  REPRODUCTIVE ORGANS: Uterus and adnexa within normal limits.    BOWEL: No bowel obstruction. Appendix is normal. Colonic diverticulosis  PERITONEUM: Small volume ascites. Generalized mesenteric fatty   infiltration, nonspecific  VESSELS: Atherosclerotic changes. Near complete occlusion at the left   internal iliac artery.  RETROPERITONEUM/LYMPH NODES: No lymphadenopathy.  ABDOMINAL WALL: Mild anasarca. Scattered subcutaneous soft tissue   nodules, some of which are calcified  BONES: Degenerative changes. No suspicious blastic or lytic lesions.   Inferior projecting Schmorl's node at L3-L4 disc space.    IMPRESSION:  No pulmonary embolism to the segmental level.    Findings suspicious for metastatic lung cancer. Large right hilar mass   obstructing the right mainstem bronchus, as further described above, with   bilateral pulmonary, judy and hepatic metastases. Indeterminate   bilateral adrenal thickening.    Heterogeneous multinodular thyroid.    --- End of Report ---          TONYA BUENO MD; Resident Radiologist  This document has been electronically signed.  GELY SCHULTZ M.D., Attending Radiologist  This document has been electronically signed. Jul 12 2023  6:59PM    < end of copied text >

## 2023-07-14 NOTE — PROGRESS NOTE ADULT - PROBLEM SELECTOR PLAN 2
NGTD blood cultures x2, urine cx - NGTD (7/12)   On zosyn   Appreciate ID recs  Pt answers questions after extensive questioning. She is aware of her lung cancer but unable to elicit further information.

## 2023-07-14 NOTE — PROGRESS NOTE ADULT - PROBLEM SELECTOR PLAN 1
Likely in the setting of large R hilar mass obstructing the mainstem bronchus seen on CTA Chest, negative for PE   - found to be hypoxic, on 6L NC  - C/w Zosyn for post-obstructive PNA   - aggressive pulmonary toilet: chest PT, duo-neb, hypersal  - Discussed with pulm fellow Dr. Mendoza, plan for possible urgent bronchoscopy today   - Per collateral from pulm, NO stent was placed at [facility].    - low threshold for MICU eval if worsening resp status

## 2023-07-14 NOTE — PROGRESS NOTE ADULT - SUBJECTIVE AND OBJECTIVE BOX
Interval Events:  - INR improved to 1.89 today  - on 6L NC     REVIEW OF SYSTEMS:  Negative except as documented above.      OBJECTIVE:  ICU Vital Signs Last 24 Hrs  T(C): 37 (14 Jul 2023 09:43), Max: 37 (14 Jul 2023 09:43)  T(F): 98.6 (14 Jul 2023 09:43), Max: 98.6 (14 Jul 2023 09:43)  HR: 92 (14 Jul 2023 10:18) (88 - 108)  BP: 150/67 (14 Jul 2023 09:43) (141/86 - 160/70)  BP(mean): 86 (13 Jul 2023 19:26) (86 - 86)  ABP: --  ABP(mean): --  RR: 18 (14 Jul 2023 09:43) (16 - 19)  SpO2: 98% (14 Jul 2023 09:43) (96% - 100%)    O2 Parameters below as of 14 Jul 2023 09:43  Patient On (Oxygen Delivery Method): nasal cannula  O2 Flow (L/min): 6            CAPILLARY BLOOD GLUCOSE      POCT Blood Glucose.: 165 mg/dL (14 Jul 2023 11:53)      PHYSICAL EXAM:  General: awake and alert,  HEENT: NC/AT, EOMI b/l, conjunctiva normal, MMM  Lymph Nodes: no cervical LAD  Neck: supple. full range of motion  Respiratory: coarse rhonchi and wheezeing   Cardiovascular: S1 S2 present, RRR, no m/r/g  Abdomen: soft, NT/ND, +BS  Extremities: no c/c/e  Skin: no rashes or lesions noted  Neurological: AAOx1, no focal deficits  Psychiatry: calm, cooperative      HOSPITAL MEDICATIONS:  MEDICATIONS  (STANDING):  albuterol/ipratropium for Nebulization 3 milliLiter(s) Nebulizer every 6 hours  atorvastatin 80 milliGRAM(s) Oral at bedtime  calcitonin Injectable 290 International Unit(s) IntraMuscular every 12 hours  dextrose 5% + sodium chloride 0.45%. 1000 milliLiter(s) (50 mL/Hr) IV Continuous <Continuous>  dextrose 5%. 1000 milliLiter(s) (100 mL/Hr) IV Continuous <Continuous>  dextrose 5%. 1000 milliLiter(s) (50 mL/Hr) IV Continuous <Continuous>  dextrose 50% Injectable 25 Gram(s) IV Push once  dextrose 50% Injectable 25 Gram(s) IV Push once  dextrose 50% Injectable 12.5 Gram(s) IV Push once  glucagon  Injectable 1 milliGRAM(s) IntraMuscular once  insulin lispro (ADMELOG) corrective regimen sliding scale   SubCutaneous three times a day before meals  insulin lispro (ADMELOG) corrective regimen sliding scale   SubCutaneous at bedtime  levothyroxine Injectable 20 MICROGram(s) IV Push at bedtime  metoprolol tartrate Injectable 2.5 milliGRAM(s) IV Push every 6 hours  pantoprazole  Injectable 40 milliGRAM(s) IV Push daily  piperacillin/tazobactam IVPB.. 3.375 Gram(s) IV Intermittent every 8 hours  polyethylene glycol 3350 17 Gram(s) Oral daily  potassium phosphate IVPB 30 milliMole(s) IV Intermittent once  sacubitril 24 mG/valsartan 26 mG 1 Tablet(s) Oral two times a day  sodium chloride 3%  Inhalation 4 milliLiter(s) Inhalation every 6 hours    MEDICATIONS  (PRN):  acetaminophen     Tablet .. 650 milliGRAM(s) Oral every 6 hours PRN Temp greater or equal to 38C (100.4F), Mild Pain (1 - 3)  dextrose Oral Gel 15 Gram(s) Oral once PRN Blood Glucose LESS THAN 70 milliGRAM(s)/deciliter  HYDROmorphone  Injectable 0.2 milliGRAM(s) IV Push every 4 hours PRN Severe Pain (7 - 10)      LABS:                        9.2    26.59 )-----------( 401      ( 14 Jul 2023 06:18 )             28.7     Hgb Trend: 9.2<--, 9.8<--, 9.4<--  07-14    149<H>  |  107  |  18  ----------------------------<  186<H>  3.0<L>   |  20<L>  |  0.69    Ca    12.6<H>      14 Jul 2023 06:18  Phos  1.8     07-14  Mg     2.10     07-14    TPro  5.8<L>  /  Alb  2.5<L>  /  TBili  2.0<H>  /  DBili  x   /  AST  70<H>  /  ALT  29  /  AlkPhos  340<H>  07-14    Creatinine Trend: 0.69<--, 0.77<--, 0.94<--  PT/INR - ( 14 Jul 2023 06:18 )   PT: 22.1 sec;   INR: 1.89 ratio         PTT - ( 14 Jul 2023 06:18 )  PTT:35.7 sec  Urinalysis Basic - ( 14 Jul 2023 06:18 )    Color: x / Appearance: x / SG: x / pH: x  Gluc: 186 mg/dL / Ketone: x  / Bili: x / Urobili: x   Blood: x / Protein: x / Nitrite: x   Leuk Esterase: x / RBC: x / WBC x   Sq Epi: x / Non Sq Epi: x / Bacteria: x        Venous Blood Gas:  07-12 @ 19:30  7.40/36/80/22/97.3  VBG Lactate: 2.8  Venous Blood Gas:  07-12 @ 17:00  7.40/37/68/23/94.0  VBG Lactate: 3.6  Venous Blood Gas:  07-12 @ 14:30  7.46/34/78/24/97.1  VBG Lactate: 4.2      MICROBIOLOGY:     Culture - Urine (collected 12 Jul 2023 22:30)  Source: Clean Catch Clean Catch (Midstream)  Final Report (13 Jul 2023 22:02):    <10,000 CFU/mL Normal Urogenital Nohelia    Culture - Blood (collected 12 Jul 2023 17:51)  Source: .Blood Blood-Peripheral  Preliminary Report (13 Jul 2023 21:01):    No growth at 24 hours    Culture - Blood (collected 12 Jul 2023 17:51)  Source: .Blood Blood-Peripheral  Preliminary Report (13 Jul 2023 21:01):    No growth at 24 hours        RADIOLOGY:  [x] Reviewed and interpreted by me

## 2023-07-14 NOTE — PROGRESS NOTE ADULT - PROBLEM SELECTOR PLAN 3
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   - palliative care consulted and spoke to patient's daughter with Dr. Hernandez, please see [facility] note from 7/13 for full details of discussion.   - Low dose IV dilaudid PRN for pain

## 2023-07-14 NOTE — SWALLOW BEDSIDE ASSESSMENT ADULT - SWALLOW EVAL: DIAGNOSIS
Upon initiation of oral mechanism examination, patient noted with thick wet and dried secretions lining hard/soft palate. RN informed and provided oral care with clinician present at bedside, however unable to adequately clean oral cavity due to patient resistance. PO trials deferred at this time given poor oral hygiene state (i.e. copious thick secretions on hard/soft palate) which will impede patient's ability to manipulate and transfer PO trials, which will increase risk for aspiration/choking exacerbated by the oral hygiene state/oral debris.

## 2023-07-14 NOTE — PROGRESS NOTE ADULT - PROBLEM SELECTOR PLAN 5
Patient's daughter, Zhanna Chang, [phone], is listed as her emergency contact. Patient also gave provider permission to speak to her. Planning to speak with daughter with medicine team.     Thank you for allowing us to participate in your patient's care. We will continue to follow with you. Please page [phone] for any q's or c's. The Geriatric and Palliative Medicine service has coverage 24 hours a day/ 7 days a week to provide medical recommendations regarding symptom management needs via telephone.    Melissa Hernandez D.O.   Palliative Medicine. > 7/14: Extensive goc discussion with patient's daughter with medical attending. See separate goc note. DNR, plan for DNI after bronchoscopy. MOLST completed and placed in chart   Caregiver support referral

## 2023-07-14 NOTE — PROGRESS NOTE ADULT - ASSESSMENT
a/p  72 y/o woman with Metastatic lung carcinoma ( SMARCA4 carcinoma) liver mets. Presents with hypoxia.    1) onc- NSCLC. liver mets. Poor PS.   not a ctx candidate at this time.  can consider outpt rx , if there is improvement.  -will touch base with patients dtr in am  - palliative care following  -prognosis is poor    2) AMS. likely related to hypercalcemia. malignancy and infection could also be playing a role  - mgmt of hypercalcemia as per med.  consider aggressive hydration/diuresis, steroids, bisphosphonates, calcitonin  - consider mri of brain to r/o mets    3) heme- anemia, leukocytosis. likely related to malig.  monitor cbc and manage supportively.  check iron stores, b12, folate, ldh, hapto.     4)coagulopathy- h/o rx with coumadin. also has liver mets.  vit k given.  f/u coags    5) Resp- mgmt as per pulm    I had a lengthy conversation with the patients daughter regarding her mothers condition and overall poor prognosis. She was unaware of the extent and severity of her mother's condition and voiced appreciation that I discussed with her the above findings .  Currently, i explained, pt is not a ctx candidate.  Perhaps with improvement in MS, management of hypercalcemia, that could change, but even then, patient's prognosis with this disease is quite poor.  f/u with palliative care.  mgmt as per medicine.   i can reached via [phone]

## 2023-07-14 NOTE — PROGRESS NOTE ADULT - SUBJECTIVE AND OBJECTIVE BOX
[facility] Geriatrics and Palliative Care  Melissa Hernandez Palliative Care Attending  Contact Info: Page [phone] (including Nights/Weekends), message on Microsoft Teams (Melissa Hernandez), or leave  at Palliative Office [phone] (non-urgent)   Date of [id number] @ 13:31    SUBJECTIVE AND OBJECTIVE: Patient seen this AM lying in bed reports pain is better but she gets dyspneic with speaking.     Indication for Geriatrics and Palliative Care Services/INTERVAL HPI: sx management and goc     OVERNIGHT EVENTS:  > 7/14: Over the past 24 hours, patient required PRNs of IV dilaudid 0.2mg x2.     DNR on chart:  Allergies    morphine (Unknown)    Intolerances    MEDICATIONS  (STANDING):  albuterol/ipratropium for Nebulization 3 milliLiter(s) Nebulizer every 6 hours  atorvastatin 80 milliGRAM(s) Oral at bedtime  calcitonin Injectable 290 International Unit(s) IntraMuscular every 12 hours  dextrose 5% + sodium chloride 0.45%. 1000 milliLiter(s) (50 mL/Hr) IV Continuous <Continuous>  dextrose 5%. 1000 milliLiter(s) (100 mL/Hr) IV Continuous <Continuous>  dextrose 5%. 1000 milliLiter(s) (50 mL/Hr) IV Continuous <Continuous>  dextrose 50% Injectable 25 Gram(s) IV Push once  dextrose 50% Injectable 25 Gram(s) IV Push once  dextrose 50% Injectable 12.5 Gram(s) IV Push once  glucagon  Injectable 1 milliGRAM(s) IntraMuscular once  insulin lispro (ADMELOG) corrective regimen sliding scale   SubCutaneous at bedtime  insulin lispro (ADMELOG) corrective regimen sliding scale   SubCutaneous three times a day before meals  levothyroxine Injectable 20 MICROGram(s) IV Push at bedtime  metoprolol tartrate Injectable 2.5 milliGRAM(s) IV Push every 6 hours  pantoprazole  Injectable 40 milliGRAM(s) IV Push daily  piperacillin/tazobactam IVPB.. 3.375 Gram(s) IV Intermittent every 8 hours  polyethylene glycol 3350 17 Gram(s) Oral daily  sacubitril 24 mG/valsartan 26 mG 1 Tablet(s) Oral two times a day  sodium chloride 3%  Inhalation 4 milliLiter(s) Inhalation every 6 hours    MEDICATIONS  (PRN):  acetaminophen     Tablet .. 650 milliGRAM(s) Oral every 6 hours PRN Temp greater or equal to 38C (100.4F), Mild Pain (1 - 3)  dextrose Oral Gel 15 Gram(s) Oral once PRN Blood Glucose LESS THAN 70 milliGRAM(s)/deciliter  HYDROmorphone  Injectable 0.2 milliGRAM(s) IV Push every 4 hours PRN Severe Pain (7 - 10)      ITEMS UNCHECKED ARE NOT PRESENT    PRESENT SYMPTOMS: [ ]Unable to self-report - see [ ] CPOT [ ] PAINADS [ ] RDOS  Source if other than patient:  [ ]Family   [ ]Team     Pain: [ x]yes [ ]no  QOL impact - unable to perform ADLs, uncomfortable in bed  Location -  head, back, shoulder.   Aggravating factors - unable to elicit   Quality - unable to elicit as patient restless in bed   Radiation - unable to elicit as patient restless in bed   Timing- constant   Severity (0-10 scale): 9  Minimal acceptable level/pain goal (0-10 scale): 2    CPOT:    https://www.[facility].org/getattachment/[email or web address]-[email or web address]/Critical-Care-Pain-Observation-Tool-(CPOT)    Dyspnea:                           [ ]Mild [ ]Moderate [ ]Severe  Anxiety:                             [ ]Mild [ ]Moderate [ ]Severe  Fatigue:                             [ ]Mild [ ]Moderate [ ]Severe  Nausea:                             [ ]Mild [ ]Moderate [ ]Severe  Loss of appetite:              [ ]Mild [ ]Moderate [ ]Severe  Constipation:                    [ ]Mild [ ]Moderate [ ]Severe  Other Symptoms:  [ ]All other review of systems negative     PCSSQ[Palliative Care Spiritual Screening Question]   Severity (0-10):  Score of 4 or > indicate consideration of Chaplaincy referral.  Chaplaincy Referral: [ ] yes [ ] refused [ ] following [ x] deferred    Caregiver Media? : [ ] yes [x ] no [ ] Deferred [ ] Declined             Social work referral [ ] Patient & Family Centered Care Referral [ ]  Anticipatory Grief present?:  [ ] yes [x ] no  [ ] Deferred                  Social work referral [ ] Patient & Family Centered Care Referral [ ]      PHYSICAL EXAM:  Vital Signs Last 24 Hrs  T(C): 37.1 (14 Jul 2023 12:20), Max: 37.1 (14 Jul 2023 12:20)  T(F): 98.7 (14 Jul 2023 12:20), Max: 98.7 (14 Jul 2023 12:20)  HR: 117 (14 Jul 2023 12:20) (88 - 150)  BP: 156/79 (14 Jul 2023 12:20) (141/86 - 160/70)  BP(mean): 86 (13 Jul 2023 19:26) (86 - 86)  RR: 20 (14 Jul 2023 12:20) (16 - 20)  SpO2: 97% (14 Jul 2023 12:20) (96% - 100%)    Parameters below as of 14 Jul 2023 12:20  Patient On (Oxygen Delivery Method): nasal cannula  O2 Flow (L/min): 6   I&O's Summary     GENERAL: [ ]Cachexia    [x ]Alert  [ x]Oriented x 1-2 (knows self, states she is in [place])   [ ]Lethargic  [ ]Unarousable  [x ]Verbal  [ ]Non-Verbal  Behavioral:   [ ] Anxiety  [ ] Delirium [ ] Agitation [x ] Other- restless in bed, pushing hospital bed buttons   HEENT:  [ ]Normal   [x ]Dry mouth   [ ]ET Tube/Trach  [ ]Oral lesions  PULMONARY:   [ ]Clear [ x]Tachypnea  [ ]Audible excessive secretions   [ ]Rhonchi        [ ]Right [ ]Left [ ]Bilateral  [ ]Crackles        [ ]Right [ ]Left [ ]Bilateral  [ ]Wheezing     [ ]Right [ ]Left [ ]Bilateral  [ ]Diminished breath sounds [ ]right [ ]left [ ]bilateral  CARDIOVASCULAR:    [ ]Regular [ ]Irregular [ x]Tachy  [ ]Deion [ ]Murmur [ ]Other  GASTROINTESTINAL:  [x ]Soft  [ ]Distended   [ ]+BS  [ ]Non tender [ ]Tender  [ ]Other [ ]PEG [ ]OGT/ NGT  Last BM: 7/13  GENITOURINARY:  [ ]Normal [x ] Incontinent   [ ]Oliguria/Anuria   [ ]Phan  MUSCULOSKELETAL:   [ ]Normal   [ x]Weakness  [ ]Bed/Wheelchair bound [ ]Edema  NEUROLOGIC:   [ ]No focal deficits  [ ]Cognitive impairment  [ ]Dysphagia [ ]Dysarthria [ ]Paresis [x ]Other   SKIN: Please see flowsheets   [ ]Normal  [ ]Rash  [ ]Other  [ ]Pressure ulcer(s)       Present on admission [ ]y [ ]n      LABS:                        9.2    26.59 )-----------( 401      ( 14 Jul 2023 06:18 )             28.7   07-14    149<H>  |  107  |  18  ----------------------------<  186<H>  3.0<L>   |  20<L>  |  0.69    Ca    12.6<H>      14 Jul 2023 06:18  Phos  1.8     07-14  Mg     2.10     07-14    TPro  5.8<L>  /  Alb  2.5<L>  /  TBili  2.0<H>  /  DBili  x   /  AST  70<H>  /  ALT  29  /  AlkPhos  340<H>  07-14  PT/INR - ( 14 Jul 2023 06:18 )   PT: 22.1 sec;   INR: 1.89 ratio         PTT - ( 14 Jul 2023 06:18 )  PTT:35.7 sec    Urinalysis Basic - ( 14 Jul 2023 06:18 )    Color: x / Appearance: x / SG: x / pH: x  Gluc: 186 mg/dL / Ketone: x  / Bili: x / Urobili: x   Blood: x / Protein: x / Nitrite: x   Leuk Esterase: x / RBC: x / WBC x   Sq Epi: x / Non Sq Epi: x / Bacteria: x      RADIOLOGY & ADDITIONAL STUDIES: n/a     Protein Calorie Malnutrition Present: [ ]mild [ ]moderate [ ]severe [ ]underweight [ ]morbid obesity  https://www.andeal.org/vault/2440/web/files/ONC/Table_Clinical%20Characteristics%20to%20Document%20Malnutrition-White%20JV%20et%20al%202012.pdf    Height (cm): 157.5 (07-13-23 @ 06:08)  Weight (kg): 72.8 (07-13-23 @ 06:08)  BMI (kg/m2): 29.3 (07-13-23 @ 06:08)    [ ]PPSV2 < or = 30%  [ ]significant weight loss [ ]poor nutritional intake [ ]anasarca[ ]Artificial Nutrition    Other REFERRALS:  [ ]Hospice  [ ]Child Life  [ ]Social Work  [ ]Case management [ ]Holistic Therapy

## 2023-07-14 NOTE — PROGRESS NOTE ADULT - PROBLEM SELECTOR PLAN 5
Ca2+ corrected = 13.8  - On gentle IVF for now given hx of CHF and current respiratory status  - Will give calcitonin and pamidronate today  - Monitor Calcium

## 2023-07-14 NOTE — SWALLOW BEDSIDE ASSESSMENT ADULT - ADDITIONAL RECOMMENDATIONS
2. Patient requires thorough oral hygiene 3. Medical team advised to reconsult this department with any change in medical status and/or as patient becomes medically optimized (i.e. improved oral hygiene state). 4. This service to follow-up as schedule permits to determine candidacy for oral intake.

## 2023-07-14 NOTE — PROGRESS NOTE ADULT - ASSESSMENT
73 year old Female with R sided lung cancer with metastasis to lung and liver, mechanical aortic valve on warfarin, ?HFrEF, T2DM, COPD,  hypothyroidism,  and depression who presented on 7/12 from [place] for hypoxia and respiratory distress.    Febrile to 101.2 F  On 6 L NC   Leukocytosis 20--24K       Workup:  -CT angio C/A/P: No pulmonary embolism to the segmental level. Findings suspicious for metastatic lung cancer. Large right hilar mass obstructing the right mainstem bronchus, as further described above, with bilateral pulmonary, judy and hepatic metastases. Indeterminate bilateral adrenal thickening.  -RVP neg     -UA (7/12): WBC 11, neg bacteria, neg LE and Nitrite     Antimicrobials:  Vanc 7/12  Zosyn 7/12-->    #Fever, leukocytosis, acute hypoxic resp failure, obstructive R lung mass, post obstructive pneumonia Vs worsening lung mass and metastasis   #R sided lung cancer with metastasis to lung and liver    blood cultures no growth to date   MRSA/ MSSA PCR neg    pulmonary. hem/onc eval noted       Recommendations:       Continue Zosyn 3.375 g IV q 8hrs   continue to follow blood cultures  check sputum culture if able       ID service available over weekend

## 2023-07-14 NOTE — PROGRESS NOTE ADULT - ASSESSMENT
73F with newly diagnosed metastatic lung cancer, AVR (on Jantoven), T2DM, COPD, HTN, hypothyroidism, HFpEF and depression who presents from [place] for respiratory distress and hypoxia, found to have sepsis and acute hypoxemic respiratory failure likely 2/2 from post-obstructive pneumonia for R lung mass

## 2023-07-14 NOTE — PROGRESS NOTE ADULT - PROBLEM SELECTOR PLAN 6
Patient's daughter, Zhanna Chang, [phone], is listed as her emergency contact. Patient also gave provider permission to speak to her. Planning to speak with daughter with medicine team.     Thank you for allowing us to participate in your patient's care. We will continue to follow with you. Please page [phone] for any q's or c's. The Geriatric and Palliative Medicine service has coverage 24 hours a day/ 7 days a week to provide medical recommendations regarding symptom management needs via telephone.    Melissa Hernandez D.O.   Palliative Medicine.

## 2023-07-14 NOTE — GOALS OF CARE CONVERSATION - ADVANCED CARE PLANNING - CONVERSATION DETAILS
discussed scheduled procedure at length with patient's daughter and aunt over the phone. Patient with dismal overall prognosis and procedure although possible would be high risk and likely offer limited long term benefit patient. After much discussion, decision made to cancel procedure and transition to full comfort care.relayed information to primary team and palliative care team
Referral for complex decision making and symptom management in setting of advanced malignancy. Introduced role of palliative care team with Medicine Attending, Dr. Last providing medical update. Reviewed patient's diagnosis including CT findings of metastatic lung cancer to liver and adrenal glands. Zhanna shared that she spoke to Oncologist, Dr. Michaud, recently and understands that patient's cancer is advanced and there is no cure for her malignancy. Zhanna was very tearful and emotional throughout encounter sharing her frustrations about hearing from [facility] doctors that they "got all of the cancer out and that patient was stable for discharge to [facility] but patient declined significantly at rehab". Emotional support provided and clarified that patient's cancer is advanced and any type of treatment would be palliative intent if her performance status improved. Attempted to explain the option of pursuing supportive care services once patient was stabilized with Hospice services but Zhanna became very tearful about multiple issues in her life that have impacted her including personal injury and ceiling in her house falling in. At this time, plan is to continue to perform medical management to stabilize respiratory status.     Discussed advanced directives including CPR and mechanical ventilation in setting of malignancy. Reviewed the risks and benefits of these interventions at the EOL in setting of malignancy sharing that these interventions might pose more burden than benefit. Zahnna stated she doesn't want mom to suffer and understands that performing life sustaining interventions like cpr would not cure her cancer. She was in agreement with DNR and DNI after bronchoscopy.     Offered caregiver support.

## 2023-07-14 NOTE — PROGRESS NOTE ADULT - PROBLEM SELECTOR PLAN 2
Patient with tachycardia, tachypnea, leukocytosis, fever, in the setting of likely post obstructive PNA  - s/p vanc and zosyn in the ED  - patient with recent admission at [facility] for AHRF due to post obstructive PNA, tx with IV levaquin  - likely obstructive PNA from large R hilar mass obstructing the mainstem bronchus  - continue with IV zosyn, ID recs appreciated   - F/u urine legionella, urine strep, MRSA PCR

## 2023-07-14 NOTE — PROGRESS NOTE ADULT - PROBLEM SELECTOR PLAN 1
Per chart review, patient with recently diagnosed lung cancer at [facility] (6/2023).   > CTA/p (7/12): Findings suspicious for metastatic lung cancer. Large right hilar mass obstructing the right mainstem bronchus, as further described above, with bilateral pulmonary, judy and hepatic metastases. Indeterminate   bilateral adrenal thickening.  > Per chart review, patient has not started treatment yet.   > Appreciate oncology recs- at this time, patient with poor performance status, would not be a candidate for DMT unless there was improvement in functional status.

## 2023-07-14 NOTE — PROGRESS NOTE ADULT - PROBLEM SELECTOR PLAN 6
Atrial tachycardia noted to 150s  - Will give IV lopressor 2.5mg q6h standing   - Can give PRN lopressor 5mg IVP as needed  - AC as below once able  - Monitor on tele

## 2023-07-14 NOTE — CHART NOTE - NSCHARTNOTEFT_GEN_A_CORE
after family discussion, canceled IP case, plan to transition to comfort care.  can treat secretions with glycopyrrolate and treat dyspnea with low dose dilaudid  can continue airway clearance as tolerated and comfortable for patient: humidified oxygen, standing guaifenesin, duonebs q6h, hyper sal q6h,   possible chest PT q6 h, aerobika/acapella q6 h,   confirmed DNR/DNI  no further pulmonary recommendations  Tiburcio Mendzoa MD  [facility] PGY5  [facility] [address], [facility] [phone]

## 2023-07-14 NOTE — PROGRESS NOTE ADULT - ASSESSMENT
73 year old Female with R sided lung cancer with metastasis to lung and liver, mechanical aortic valve on warfarin, ?HFrEF, T2DM, COPD, HTN, hypothyroidism, HLD, and depression who presented on 7/12 from [place] for hypoxia and respiratory distress. Palliative consulted for symptom management in setting of advanced malignancy.

## 2023-07-14 NOTE — PROGRESS NOTE ADULT - ASSESSMENT
73 year old F smoker with newly diagnosed lung cancer with metastasis to lung and liver not yet on treatment, mechanical aortic valve on warfarin, HFrEF, T2DM, COPD, HTN, hypothyroidism, HLD, and depression who presents from [place] for hypoxia and respiratory distress found to have likely post obstructive pna in the setting of endobronchial obstruction vs external compression from malignancy.     Patient was recently admitted to [facility] s/p endobronchial debulking for obstructing Lung Ca with no stents placed and not yet on treatment and was discharged to [facility].    # lung ca  # endobronchial obstruction  - continue vanc, zosyn for pneumonia coverage  - continue airway clearance: humidified oxygen, guaifenesin, duonebs q6, hypersal q6, aerobika q6, chest vest q12  - plan for bronchoscopy today, keep npo and off anticoagulation  - will determine when anticoagulation can be restarted post procedure   - appreciate cardiology recommendations       Mis Wolfe MD  Pulmonary and Critical Care Fellow

## 2023-07-14 NOTE — PROGRESS NOTE ADULT - PROBLEM SELECTOR PLAN 3
On supplemental oxygen   Patient appears very dyspneic during encounter.   Appreciate pulmonary recs- plan for bronch today  (7/14)   CT shows obstructing right mainstem bronchus

## 2023-07-14 NOTE — PROGRESS NOTE ADULT - ATTENDING COMMENTS
Patient with malignant central airway obstruction with acute hypoxemic respiratory failure. Originally planned for endobronchial intervention today, upon further discussion, daughter does not want to pursue aggressive measures given progressive nature of disease and wants to focus on patient comfort. Patient transitioned to comfort care. No endobronchial interventions desired by HCP. Recommend palliative care consultation/goc discussion. No further IP intervention. Reconsult as needed.

## 2023-07-15 LAB — GLUCOSE BLDC GLUCOMTR-MCNC: 169 MG/DL — HIGH (ref 70–99)

## 2023-07-15 PROCEDURE — 99232 SBSQ HOSP IP/OBS MODERATE 35: CPT

## 2023-07-15 RX ORDER — HYDROMORPHONE HYDROCHLORIDE 2 MG/ML
0.25 INJECTION INTRAMUSCULAR; INTRAVENOUS; SUBCUTANEOUS
Refills: 0 | Status: DISCONTINUED | OUTPATIENT
Start: 2023-07-16 | End: 2023-07-16

## 2023-07-15 RX ORDER — METOPROLOL TARTRATE 50 MG
5 TABLET ORAL EVERY 6 HOURS
Refills: 0 | Status: DISCONTINUED | OUTPATIENT
Start: 2023-07-15 | End: 2023-07-20

## 2023-07-15 RX ORDER — HYDROMORPHONE HYDROCHLORIDE 2 MG/ML
0.2 INJECTION INTRAMUSCULAR; INTRAVENOUS; SUBCUTANEOUS
Refills: 0 | Status: DISCONTINUED | OUTPATIENT
Start: 2023-07-15 | End: 2023-07-15

## 2023-07-15 RX ORDER — HYDROMORPHONE HYDROCHLORIDE 2 MG/ML
0.2 INJECTION INTRAMUSCULAR; INTRAVENOUS; SUBCUTANEOUS ONCE
Refills: 0 | Status: DISCONTINUED | OUTPATIENT
Start: 2023-07-15 | End: 2023-07-16

## 2023-07-15 RX ADMIN — HYDROMORPHONE HYDROCHLORIDE 0.2 MILLIGRAM(S): 2 INJECTION INTRAMUSCULAR; INTRAVENOUS; SUBCUTANEOUS at 06:03

## 2023-07-15 RX ADMIN — PIPERACILLIN AND TAZOBACTAM 25 GRAM(S): 4; .5 INJECTION, POWDER, LYOPHILIZED, FOR SOLUTION INTRAVENOUS at 09:19

## 2023-07-15 RX ADMIN — PIPERACILLIN AND TAZOBACTAM 25 GRAM(S): 4; .5 INJECTION, POWDER, LYOPHILIZED, FOR SOLUTION INTRAVENOUS at 17:04

## 2023-07-15 RX ADMIN — SODIUM CHLORIDE 4 MILLILITER(S): 9 INJECTION INTRAMUSCULAR; INTRAVENOUS; SUBCUTANEOUS at 21:17

## 2023-07-15 RX ADMIN — PIPERACILLIN AND TAZOBACTAM 25 GRAM(S): 4; .5 INJECTION, POWDER, LYOPHILIZED, FOR SOLUTION INTRAVENOUS at 02:17

## 2023-07-15 RX ADMIN — CALCITONIN SALMON 290 INTERNATIONAL UNIT(S): 200 INJECTION, SOLUTION INTRAMUSCULAR at 05:03

## 2023-07-15 RX ADMIN — Medication 2.5 MILLIGRAM(S): at 05:03

## 2023-07-15 RX ADMIN — SODIUM CHLORIDE 4 MILLILITER(S): 9 INJECTION INTRAMUSCULAR; INTRAVENOUS; SUBCUTANEOUS at 04:27

## 2023-07-15 RX ADMIN — Medication 3 MILLILITER(S): at 04:27

## 2023-07-15 RX ADMIN — HYDROMORPHONE HYDROCHLORIDE 0.2 MILLIGRAM(S): 2 INJECTION INTRAMUSCULAR; INTRAVENOUS; SUBCUTANEOUS at 10:50

## 2023-07-15 RX ADMIN — HYDROMORPHONE HYDROCHLORIDE 0.2 MILLIGRAM(S): 2 INJECTION INTRAMUSCULAR; INTRAVENOUS; SUBCUTANEOUS at 17:34

## 2023-07-15 RX ADMIN — PANTOPRAZOLE SODIUM 40 MILLIGRAM(S): 20 TABLET, DELAYED RELEASE ORAL at 12:34

## 2023-07-15 RX ADMIN — Medication 5 MILLIGRAM(S): at 17:05

## 2023-07-15 RX ADMIN — HYDROMORPHONE HYDROCHLORIDE 0.2 MILLIGRAM(S): 2 INJECTION INTRAMUSCULAR; INTRAVENOUS; SUBCUTANEOUS at 10:19

## 2023-07-15 RX ADMIN — SODIUM CHLORIDE 4 MILLILITER(S): 9 INJECTION INTRAMUSCULAR; INTRAVENOUS; SUBCUTANEOUS at 09:35

## 2023-07-15 RX ADMIN — HYDROMORPHONE HYDROCHLORIDE 0.2 MILLIGRAM(S): 2 INJECTION INTRAMUSCULAR; INTRAVENOUS; SUBCUTANEOUS at 02:40

## 2023-07-15 RX ADMIN — HYDROMORPHONE HYDROCHLORIDE 0.2 MILLIGRAM(S): 2 INJECTION INTRAMUSCULAR; INTRAVENOUS; SUBCUTANEOUS at 07:00

## 2023-07-15 RX ADMIN — HYDROMORPHONE HYDROCHLORIDE 0.2 MILLIGRAM(S): 2 INJECTION INTRAMUSCULAR; INTRAVENOUS; SUBCUTANEOUS at 19:52

## 2023-07-15 RX ADMIN — HYDROMORPHONE HYDROCHLORIDE 0.2 MILLIGRAM(S): 2 INJECTION INTRAMUSCULAR; INTRAVENOUS; SUBCUTANEOUS at 13:42

## 2023-07-15 RX ADMIN — Medication 3 MILLILITER(S): at 09:35

## 2023-07-15 RX ADMIN — Medication 20 MICROGRAM(S): at 22:53

## 2023-07-15 RX ADMIN — HYDROMORPHONE HYDROCHLORIDE 0.2 MILLIGRAM(S): 2 INJECTION INTRAMUSCULAR; INTRAVENOUS; SUBCUTANEOUS at 03:15

## 2023-07-15 RX ADMIN — Medication 3 MILLILITER(S): at 21:17

## 2023-07-15 RX ADMIN — Medication 5 MILLIGRAM(S): at 12:34

## 2023-07-15 RX ADMIN — HYDROMORPHONE HYDROCHLORIDE 0.2 MILLIGRAM(S): 2 INJECTION INTRAMUSCULAR; INTRAVENOUS; SUBCUTANEOUS at 17:04

## 2023-07-15 RX ADMIN — HYDROMORPHONE HYDROCHLORIDE 0.2 MILLIGRAM(S): 2 INJECTION INTRAMUSCULAR; INTRAVENOUS; SUBCUTANEOUS at 22:52

## 2023-07-15 RX ADMIN — HYDROMORPHONE HYDROCHLORIDE 0.2 MILLIGRAM(S): 2 INJECTION INTRAMUSCULAR; INTRAVENOUS; SUBCUTANEOUS at 20:22

## 2023-07-15 RX ADMIN — HYDROMORPHONE HYDROCHLORIDE 0.2 MILLIGRAM(S): 2 INJECTION INTRAMUSCULAR; INTRAVENOUS; SUBCUTANEOUS at 23:52

## 2023-07-15 RX ADMIN — HYDROMORPHONE HYDROCHLORIDE 0.2 MILLIGRAM(S): 2 INJECTION INTRAMUSCULAR; INTRAVENOUS; SUBCUTANEOUS at 14:12

## 2023-07-15 NOTE — PROGRESS NOTE ADULT - PROBLEM SELECTOR PLAN 3
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   - palliative care consulted and spoke to patient's daughter with Dr. Hernandez, please see [facility] note from 7/13 for full details of discussion.   - Multiple discussions with patient's daughter, pulm team - decision made to pursue comfort measures  - Increase dilaudid 0.2mg to q3h PRN  - Consider inpatient hospice

## 2023-07-15 NOTE — PROGRESS NOTE ADULT - PROBLEM SELECTOR PLAN 4
Patient reportedly AAOx3 at baseline, now AAOx1-2  - likely from sepsis encephalopathy vs. hypercalcemia   - continue to monitor, goal is comfort   - delirium precautions

## 2023-07-15 NOTE — PROGRESS NOTE ADULT - PROBLEM SELECTOR PLAN 5
Ca2+ corrected = 13.8  - On gentle IVF for now given hx of CHF and current respiratory status  - S/p calcitonin and pamidronate today  - Goal is comfort, will not trend labs

## 2023-07-15 NOTE — PROGRESS NOTE ADULT - PROBLEM SELECTOR PLAN 1
Likely in the setting of large R hilar mass obstructing the mainstem bronchus seen on CTA Chest, negative for PE   - found to be hypoxic, on 6L NC  - C/w Zosyn for post-obstructive PNA   - aggressive pulmonary toilet: chest PT, duo-neb, hypersal  - Multiple discussions with patient's daughter, pulm team - decision made to pursue comfort measures and no plans for bronchoscopy/intervention   - Per collateral from pulm, NO stent was placed at [facility].    - Increase dilaudid 0.2mg to q3h PRN

## 2023-07-15 NOTE — CHART NOTE - NSCHARTNOTEFT_GEN_A_CORE
RN notified that patient complaining of pain barely an hour after receiving .2mg of Dilaudid. patient's dose was increased today, but without adequate control in pain. Patient with metastatic lung cancer and currently on comfort measures. Will give another .2mg of Dilaudid now. Will increase Dilaudid to 0.25mg Q3 hours. consider palliative in AM for pain management. Discussed with Dr. Kennedy

## 2023-07-15 NOTE — PROGRESS NOTE ADULT - PROBLEM SELECTOR PLAN 6
Atrial tachycardia noted to 150s  - C/w IV lopressor 5mg q6h standing   - Can give PRN lopressor 5mg IVP as needed

## 2023-07-15 NOTE — PROGRESS NOTE ADULT - PROBLEM SELECTOR PLAN 7
Patient with hx of mechanical aortic valve replacement  - on JANTOVEN at home, per daughter she CANNOT take coumadin/warfarin. She has her own meds and it was being dispensed at \[facility]". Obtained meds from \[facility]\"", now at bedside  - INR supratherapeutic at 5.77 on admission, s/p IV Vit K 10mg x 1, now subtherapeutic.   - After discussion with patient's daughter, decision was made to not continue heparin gtt or Jantoven as this requires frequent monitoring and blood draws and not in line with comfort.

## 2023-07-15 NOTE — PROGRESS NOTE ADULT - PROBLEM SELECTOR PLAN 2
Patient with tachycardia, tachypnea, leukocytosis, fever, in the setting of likely post obstructive PNA  - s/p vanc and zosyn in the ED  - likely obstructive PNA from large R hilar mass obstructing the mainstem bronchus  - continue with IV zosyn, ID recs appreciated   - F/u urine legionella, urine strep, MRSA PCR  - Daughter would like to continue antibiotics for now

## 2023-07-15 NOTE — PROGRESS NOTE ADULT - SUBJECTIVE AND OBJECTIVE BOX
[facility] Division of Hospital Medicine  Naomi Last MD  Available on [employer] TEAMS    SUBJECTIVE / OVERNIGHT EVENTS: Patient seen and examined. HPI limited given mental status. Per RN, she is pocketing food and not eating it, choking on water.     MEDICATIONS  (STANDING):  albuterol/ipratropium for Nebulization 3 milliLiter(s) Nebulizer every 6 hours  dextrose 5% + sodium chloride 0.45%. 1000 milliLiter(s) (75 mL/Hr) IV Continuous <Continuous>  levothyroxine Injectable 20 MICROGram(s) IV Push at bedtime  metoprolol tartrate Injectable 5 milliGRAM(s) IV Push every 6 hours  pantoprazole  Injectable 40 milliGRAM(s) IV Push daily  piperacillin/tazobactam IVPB.. 3.375 Gram(s) IV Intermittent every 8 hours  polyethylene glycol 3350 17 Gram(s) Oral daily  sodium chloride 3%  Inhalation 4 milliLiter(s) Inhalation every 6 hours  sodium chloride 7% Inhalation 4 milliLiter(s) Inhalation every 12 hours    MEDICATIONS  (PRN):  acetaminophen     Tablet .. 650 milliGRAM(s) Oral every 6 hours PRN Temp greater or equal to 38C (100.4F), Mild Pain (1 - 3)  guaiFENesin Oral Liquid (Sugar-Free) 100 milliGRAM(s) Oral every 6 hours PRN Cough  HYDROmorphone  Injectable 0.2 milliGRAM(s) IV Push every 3 hours PRN Severe Pain (7 - 10)      I&O's Summary    14 Jul 2023 07:01  -  15 Jul 2023 07:00  --------------------------------------------------------  IN: 0 mL / OUT: 2100 mL / NET: -2100 mL        PHYSICAL EXAM:  Vital Signs Last 24 Hrs  T(C): 37.1 (15 Jul 2023 08:25), Max: 37.1 (15 Jul 2023 08:25)  T(F): 98.7 (15 Jul 2023 08:25), Max: 98.7 (15 Jul 2023 08:25)  HR: 109 (15 Jul 2023 12:34) (85 - 115)  BP: 127/66 (15 Jul 2023 12:34) (127/66 - 170/67)  BP(mean): --  RR: 18 (15 Jul 2023 08:25) (18 - 20)  SpO2: 97% (15 Jul 2023 08:25) (95% - 100%)    Parameters below as of 15 Jul 2023 08:25  Patient On (Oxygen Delivery Method): nasal cannula  O2 Flow (L/min): 6    CONSTITUTIONAL: ill appearing female in moderate resp distress   EYES: Conjunctiva and sclera clear  ENMT: dry oral mucosa  RESPIRATORY: Increased resp effort; + decreased BS on R side. Scattered course breath sounds   CARDIOVASCULAR: +tachycardia, systolic murmur; no LE edema   ABDOMEN: Soft, Nontender, Nondistended; Bowel sounds present  MUSCULOSKELETAL:  No clubbing or cyanosis of digits; No joint swelling or tenderness to palpation  PSYCH: AAOx1    LABS:                        9.2    26.59 )-----------( 401      ( 14 Jul 2023 06:18 )             28.7     07-14    149<H>  |  107  |  18  ----------------------------<  186<H>  3.0<L>   |  20<L>  |  0.69    Ca    12.6<H>      14 Jul 2023 06:18  Phos  1.8     07-14  Mg     2.10     07-14    TPro  5.8<L>  /  Alb  2.5<L>  /  TBili  2.0<H>  /  DBili  x   /  AST  70<H>  /  ALT  29  /  AlkPhos  340<H>  07-14    PT/INR - ( 14 Jul 2023 06:18 )   PT: 22.1 sec;   INR: 1.89 ratio         PTT - ( 14 Jul 2023 06:18 )  PTT:35.7 sec      Urinalysis Basic - ( 14 Jul 2023 06:18 )    Color: x / Appearance: x / SG: x / pH: x  Gluc: 186 mg/dL / Ketone: x  / Bili: x / Urobili: x   Blood: x / Protein: x / Nitrite: x   Leuk Esterase: x / RBC: x / WBC x   Sq Epi: x / Non Sq Epi: x / Bacteria: x        Culture - Urine (collected 12 Jul 2023 22:30)  Source: Clean Catch Clean Catch (Midstream)  Final Report (13 Jul 2023 22:02):    <10,000 CFU/mL Normal Urogenital Nohelia    Culture - Blood (collected 12 Jul 2023 17:51)  Source: .Blood Blood-Peripheral  Preliminary Report (14 Jul 2023 21:01):    No growth at 48 Hours    Culture - Blood (collected 12 Jul 2023 17:51)  Source: .Blood Blood-Peripheral  Preliminary Report (14 Jul 2023 21:01):    No growth at 48 Hours      SARS-CoV-2: NotDetec (12 Jul 2023 14:30)      RADIOLOGY & ADDITIONAL TESTS:  New Results Reviewed Today:   New Imaging Personally Reviewed Today:  New Electrocardiogram Personally Reviewed Today:  Prior or Outpatient Records Reviewed Today:    COMMUNICATION:  Care Discussed with Consultants/Other Providers and Details of Discussion:  Discussions with Patient/Family:  PCP Communication:

## 2023-07-15 NOTE — PROGRESS NOTE ADULT - ASSESSMENT
73F with newly diagnosed metastatic lung cancer, AVR (on Jantoven), T2DM, COPD, HTN, hypothyroidism, HFpEF and depression who presents from [place] for respiratory distress and hypoxia, found to have sepsis and acute hypoxemic respiratory failure likely 2/2 from post-obstructive pneumonia for R lung mass

## 2023-07-16 PROCEDURE — 99233 SBSQ HOSP IP/OBS HIGH 50: CPT

## 2023-07-16 RX ORDER — HYDROMORPHONE HYDROCHLORIDE 2 MG/ML
0.5 INJECTION INTRAMUSCULAR; INTRAVENOUS; SUBCUTANEOUS
Refills: 0 | Status: DISCONTINUED | OUTPATIENT
Start: 2023-07-16 | End: 2023-07-20

## 2023-07-16 RX ORDER — HYDROMORPHONE HYDROCHLORIDE 2 MG/ML
0.2 INJECTION INTRAMUSCULAR; INTRAVENOUS; SUBCUTANEOUS EVERY 6 HOURS
Refills: 0 | Status: DISCONTINUED | OUTPATIENT
Start: 2023-07-16 | End: 2023-07-18

## 2023-07-16 RX ORDER — HYDROXYZINE HCL 10 MG
25 TABLET ORAL EVERY 6 HOURS
Refills: 0 | Status: DISCONTINUED | OUTPATIENT
Start: 2023-07-16 | End: 2023-07-20

## 2023-07-16 RX ADMIN — Medication 3 MILLILITER(S): at 09:26

## 2023-07-16 RX ADMIN — PIPERACILLIN AND TAZOBACTAM 25 GRAM(S): 4; .5 INJECTION, POWDER, LYOPHILIZED, FOR SOLUTION INTRAVENOUS at 00:14

## 2023-07-16 RX ADMIN — HYDROMORPHONE HYDROCHLORIDE 0.25 MILLIGRAM(S): 2 INJECTION INTRAMUSCULAR; INTRAVENOUS; SUBCUTANEOUS at 03:37

## 2023-07-16 RX ADMIN — Medication 5 MILLIGRAM(S): at 06:41

## 2023-07-16 RX ADMIN — Medication 3 MILLILITER(S): at 22:20

## 2023-07-16 RX ADMIN — HYDROMORPHONE HYDROCHLORIDE 0.2 MILLIGRAM(S): 2 INJECTION INTRAMUSCULAR; INTRAVENOUS; SUBCUTANEOUS at 01:14

## 2023-07-16 RX ADMIN — PIPERACILLIN AND TAZOBACTAM 25 GRAM(S): 4; .5 INJECTION, POWDER, LYOPHILIZED, FOR SOLUTION INTRAVENOUS at 09:46

## 2023-07-16 RX ADMIN — HYDROMORPHONE HYDROCHLORIDE 0.2 MILLIGRAM(S): 2 INJECTION INTRAMUSCULAR; INTRAVENOUS; SUBCUTANEOUS at 18:03

## 2023-07-16 RX ADMIN — PIPERACILLIN AND TAZOBACTAM 25 GRAM(S): 4; .5 INJECTION, POWDER, LYOPHILIZED, FOR SOLUTION INTRAVENOUS at 17:32

## 2023-07-16 RX ADMIN — Medication 0.25 MILLIGRAM(S): at 22:47

## 2023-07-16 RX ADMIN — HYDROMORPHONE HYDROCHLORIDE 0.5 MILLIGRAM(S): 2 INJECTION INTRAMUSCULAR; INTRAVENOUS; SUBCUTANEOUS at 10:53

## 2023-07-16 RX ADMIN — SODIUM CHLORIDE 4 MILLILITER(S): 9 INJECTION INTRAMUSCULAR; INTRAVENOUS; SUBCUTANEOUS at 15:50

## 2023-07-16 RX ADMIN — HYDROMORPHONE HYDROCHLORIDE 0.5 MILLIGRAM(S): 2 INJECTION INTRAMUSCULAR; INTRAVENOUS; SUBCUTANEOUS at 15:46

## 2023-07-16 RX ADMIN — Medication 5 MILLIGRAM(S): at 22:47

## 2023-07-16 RX ADMIN — Medication 20 MICROGRAM(S): at 22:47

## 2023-07-16 RX ADMIN — HYDROMORPHONE HYDROCHLORIDE 0.25 MILLIGRAM(S): 2 INJECTION INTRAMUSCULAR; INTRAVENOUS; SUBCUTANEOUS at 06:42

## 2023-07-16 RX ADMIN — HYDROMORPHONE HYDROCHLORIDE 0.2 MILLIGRAM(S): 2 INJECTION INTRAMUSCULAR; INTRAVENOUS; SUBCUTANEOUS at 00:14

## 2023-07-16 RX ADMIN — SODIUM CHLORIDE 4 MILLILITER(S): 9 INJECTION INTRAMUSCULAR; INTRAVENOUS; SUBCUTANEOUS at 22:20

## 2023-07-16 RX ADMIN — Medication 5 MILLIGRAM(S): at 17:33

## 2023-07-16 RX ADMIN — SODIUM CHLORIDE 4 MILLILITER(S): 9 INJECTION INTRAMUSCULAR; INTRAVENOUS; SUBCUTANEOUS at 03:10

## 2023-07-16 RX ADMIN — HYDROMORPHONE HYDROCHLORIDE 0.25 MILLIGRAM(S): 2 INJECTION INTRAMUSCULAR; INTRAVENOUS; SUBCUTANEOUS at 07:42

## 2023-07-16 RX ADMIN — HYDROMORPHONE HYDROCHLORIDE 0.25 MILLIGRAM(S): 2 INJECTION INTRAMUSCULAR; INTRAVENOUS; SUBCUTANEOUS at 04:37

## 2023-07-16 RX ADMIN — Medication 5 MILLIGRAM(S): at 11:51

## 2023-07-16 RX ADMIN — PANTOPRAZOLE SODIUM 40 MILLIGRAM(S): 20 TABLET, DELAYED RELEASE ORAL at 11:51

## 2023-07-16 RX ADMIN — SODIUM CHLORIDE 4 MILLILITER(S): 9 INJECTION INTRAMUSCULAR; INTRAVENOUS; SUBCUTANEOUS at 09:26

## 2023-07-16 RX ADMIN — HYDROMORPHONE HYDROCHLORIDE 0.2 MILLIGRAM(S): 2 INJECTION INTRAMUSCULAR; INTRAVENOUS; SUBCUTANEOUS at 17:33

## 2023-07-16 RX ADMIN — Medication 0.25 MILLIGRAM(S): at 11:59

## 2023-07-16 RX ADMIN — Medication 5 MILLIGRAM(S): at 00:14

## 2023-07-16 RX ADMIN — HYDROMORPHONE HYDROCHLORIDE 0.5 MILLIGRAM(S): 2 INJECTION INTRAMUSCULAR; INTRAVENOUS; SUBCUTANEOUS at 10:23

## 2023-07-16 RX ADMIN — HYDROMORPHONE HYDROCHLORIDE 0.5 MILLIGRAM(S): 2 INJECTION INTRAMUSCULAR; INTRAVENOUS; SUBCUTANEOUS at 15:16

## 2023-07-16 RX ADMIN — Medication 3 MILLILITER(S): at 03:10

## 2023-07-16 RX ADMIN — Medication 3 MILLILITER(S): at 15:50

## 2023-07-16 NOTE — PROGRESS NOTE ADULT - SUBJECTIVE AND OBJECTIVE BOX
ANATOLY MERRITT  MRN-[id number]    Patient is a 73y old  Female who presents with a chief complaint of Respiratory distress (15 Jul 2023 14:13)      Review of System  REVIEW OF SYSTEMS      General:	Denies fatigue, fevers, chills, sweats, decreased appetite.    Skin/Breast: denies pruritis, rash  	  Ophthalmologic: no change in vision or blurring  	  HEENT	Denies dry mouth, oral sores, dysphagia,  change in hearing.    Respiratory and Thorax:  cough, sob, wheeze, hemoptysis  	  Cardiovascular:	no cp , palp, orthopnea    Gastrointestinal:	no n/v/d constipation    Genitourinary:	no dysuria of frequency, no hematuria, no flank pain    Musculoskeletal:	no bone or joint pain. no muscle aches.     Neurological:	no change in sensory or motor function. no headache. no weakness.     Psychiatric:	no depression, no anxiety, insomnia.     Hematology/Lymphatics:	no bleeding or bruising        Current Meds  MEDICATIONS  (STANDING):  albuterol/ipratropium for Nebulization 3 milliLiter(s) Nebulizer every 6 hours  dextrose 5% + sodium chloride 0.45%. 1000 milliLiter(s) (75 mL/Hr) IV Continuous <Continuous>  levothyroxine Injectable 20 MICROGram(s) IV Push at bedtime  metoprolol tartrate Injectable 5 milliGRAM(s) IV Push every 6 hours  pantoprazole  Injectable 40 milliGRAM(s) IV Push daily  piperacillin/tazobactam IVPB.. 3.375 Gram(s) IV Intermittent every 8 hours  polyethylene glycol 3350 17 Gram(s) Oral daily  sodium chloride 3%  Inhalation 4 milliLiter(s) Inhalation every 6 hours  sodium chloride 7% Inhalation 4 milliLiter(s) Inhalation every 12 hours    MEDICATIONS  (PRN):  acetaminophen     Tablet .. 650 milliGRAM(s) Oral every 6 hours PRN Temp greater or equal to 38C (100.4F), Mild Pain (1 - 3)  guaiFENesin Oral Liquid (Sugar-Free) 100 milliGRAM(s) Oral every 6 hours PRN Cough  HYDROmorphone  Injectable 0.25 milliGRAM(s) IV Push every 3 hours PRN Severe Pain (7 - 10)      Vitals  Vital Signs Last 24 Hrs  T(C): 36.4 (16 Jul 2023 06:41), Max: 36.6 (15 Jul 2023 22:48)  T(F): 97.6 (16 Jul 2023 06:41), Max: 97.8 (15 Jul 2023 22:48)  HR: 119 (16 Jul 2023 06:41) (95 - 124)  BP: 122/74 (16 Jul 2023 06:41) (122/74 - 162/73)  BP(mean): --  RR: 18 (16 Jul 2023 06:41) (18 - 20)  SpO2: 98% (16 Jul 2023 06:41) (93% - 99%)    Parameters below as of 16 Jul 2023 06:41  Patient On (Oxygen Delivery Method): nasal cannula  O2 Flow (L/min): 6      Physical Exam  PHYSICAL EXAM:      Constitutional: NAD    Eyes: PERRLA EOMI, anicteric sclera    Heent :No oral sores, no pharyngeal injection. moist mucosa.    Neck: supple, no jvd, no LAD    Respiratory: CTA b/l     Cardiovascular: s1s2, no m/g/r    Gastrointestinal: soft, nt, nd, + BS    Extremities: no c/c/e    Neurological:A&O x 3 moves all ext.    Skin: no rash on exposed skin    Lymph Nodes: no lymphadenopathy.              Lab              Rad:    Assessment/Plan   ANATOLY MERRITT  MRN-[id number]    Patient is a 73y old  Female who presents with a chief complaint of Respiratory distress (15 Jul 2023 14:13)      Review of System    Patient seen.  Not participating in ros  Also d/w RN    Current Meds  MEDICATIONS  (STANDING):  albuterol/ipratropium for Nebulization 3 milliLiter(s) Nebulizer every 6 hours  dextrose 5% + sodium chloride 0.45%. 1000 milliLiter(s) (75 mL/Hr) IV Continuous <Continuous>  levothyroxine Injectable 20 MICROGram(s) IV Push at bedtime  metoprolol tartrate Injectable 5 milliGRAM(s) IV Push every 6 hours  pantoprazole  Injectable 40 milliGRAM(s) IV Push daily  piperacillin/tazobactam IVPB.. 3.375 Gram(s) IV Intermittent every 8 hours  polyethylene glycol 3350 17 Gram(s) Oral daily  sodium chloride 3%  Inhalation 4 milliLiter(s) Inhalation every 6 hours  sodium chloride 7% Inhalation 4 milliLiter(s) Inhalation every 12 hours    MEDICATIONS  (PRN):  acetaminophen     Tablet .. 650 milliGRAM(s) Oral every 6 hours PRN Temp greater or equal to 38C (100.4F), Mild Pain (1 - 3)  guaiFENesin Oral Liquid (Sugar-Free) 100 milliGRAM(s) Oral every 6 hours PRN Cough  HYDROmorphone  Injectable 0.25 milliGRAM(s) IV Push every 3 hours PRN Severe Pain (7 - 10)      Vitals  Vital Signs Last 24 Hrs  T(C): 36.4 (16 Jul 2023 06:41), Max: 36.6 (15 Jul 2023 22:48)  T(F): 97.6 (16 Jul 2023 06:41), Max: 97.8 (15 Jul 2023 22:48)  HR: 119 (16 Jul 2023 06:41) (95 - 124)  BP: 122/74 (16 Jul 2023 06:41) (122/74 - 162/73)  BP(mean): --  RR: 18 (16 Jul 2023 06:41) (18 - 20)  SpO2: 98% (16 Jul 2023 06:41) (93% - 99%)    Parameters below as of 16 Jul 2023 06:41  Patient On (Oxygen Delivery Method): nasal cannula  O2 Flow (L/min): 6      PHYSICAL EXAM:    Constitutional: NAD    Eyes: PERRLA EOMI, anicteric sclera    Heent :No oral sores, no pharyngeal injection. moist mucosa.    Neck: supple, no jvd, no LAD    Respiratory: CTA b/l     Cardiovascular: s1s2, no m/g/r    Gastrointestinal: soft, nt, nd, + BS    Extremities: no c/c/e    Neurological:A&O x 3 moves all ext.    Skin: no rash on exposed skin    Lymph Nodes: no lymphadenopathy.        Lab              Rad:    Assessment/Plan   ANATOLY MERRITT  MRN-[id number]    Patient is a 73y old  Female who presents with a chief complaint of Respiratory distress (15 Jul 2023 14:13)      Review of System    Patient seen.  Not participating in ros  Also d/w RN    Current Meds  MEDICATIONS  (STANDING):  albuterol/ipratropium for Nebulization 3 milliLiter(s) Nebulizer every 6 hours  dextrose 5% + sodium chloride 0.45%. 1000 milliLiter(s) (75 mL/Hr) IV Continuous <Continuous>  levothyroxine Injectable 20 MICROGram(s) IV Push at bedtime  metoprolol tartrate Injectable 5 milliGRAM(s) IV Push every 6 hours  pantoprazole  Injectable 40 milliGRAM(s) IV Push daily  piperacillin/tazobactam IVPB.. 3.375 Gram(s) IV Intermittent every 8 hours  polyethylene glycol 3350 17 Gram(s) Oral daily  sodium chloride 3%  Inhalation 4 milliLiter(s) Inhalation every 6 hours  sodium chloride 7% Inhalation 4 milliLiter(s) Inhalation every 12 hours    MEDICATIONS  (PRN):  acetaminophen     Tablet .. 650 milliGRAM(s) Oral every 6 hours PRN Temp greater or equal to 38C (100.4F), Mild Pain (1 - 3)  guaiFENesin Oral Liquid (Sugar-Free) 100 milliGRAM(s) Oral every 6 hours PRN Cough  HYDROmorphone  Injectable 0.25 milliGRAM(s) IV Push every 3 hours PRN Severe Pain (7 - 10)      Vitals  Vital Signs Last 24 Hrs  T(C): 36.4 (16 Jul 2023 06:41), Max: 36.6 (15 Jul 2023 22:48)  T(F): 97.6 (16 Jul 2023 06:41), Max: 97.8 (15 Jul 2023 22:48)  HR: 119 (16 Jul 2023 06:41) (95 - 124)  BP: 122/74 (16 Jul 2023 06:41) (122/74 - 162/73)  BP(mean): --  RR: 18 (16 Jul 2023 06:41) (18 - 20)  SpO2: 98% (16 Jul 2023 06:41) (93% - 99%)    Parameters below as of 16 Jul 2023 06:41  Patient On (Oxygen Delivery Method): nasal cannula  O2 Flow (L/min): 6      PHYSICAL EXAM:    Constitutional: NAD    Heent :No oral sores, no pharyngeal injection. moist mucosa.    Neck: supple, no jvd, no LAD    Respiratory: CTA b/l     Cardiovascular: s1s2, no m/g/r    Gastrointestinal: soft, nt, nd, + BS    Extremities: no c/c/e    Skin: no rash on exposed skin    Lymph Nodes: no lymphadenopathy.        Lab              Rad:    Assessment/Plan

## 2023-07-16 NOTE — PROGRESS NOTE ADULT - ASSESSMENT
a/p  74 y/o woman with Metastatic lung carcinoma ( SMARCA4 carcinoma) liver mets. Presents with hypoxia.    1) onc- NSCLC. liver mets. Poor PS.   not a ctx candidate at this time.  can consider outpt rx , if there is improvement.  - palliative care following  -prognosis is poor  - family has opted for comfort measures    2) AMS. likely related to hypercalcemia. malignancy and infection could also be playing a role  - mgmt of hypercalcemia as per med.  consider aggressive hydration/diuresis, steroids, bisphosphonates, calcitonin  - consider mri of brain to r/o mets    3) heme- anemia, leukocytosis. likely related to malig.  monitor cbc and manage supportively.    4)coagulopathy- h/o rx with coumadin. also has liver mets.  vit k given.  f/u coags    5) Resp- mgmt as per pulm    on Friday 7/14 I had a lengthy conversation with the patients daughter regarding her mothers condition and overall poor prognosis. She was unaware of the extent and severity of her mother's condition and voiced appreciation that I discussed with her the above findings .  Currently, i explained, pt is not a ctx candidate.  Perhaps with improvement in MS, management of hypercalcemia, that could change, but even then, patient's prognosis with this disease is quite poor.  f/u with palliative care.  mgmt as per medicine.   i can reached via [phone]    Family has opted for comfort care at this time.  Will sign off, please reconsult as needed     a/p  74 y/o woman with Metastatic lung carcinoma ( SMARCA4 carcinoma) liver mets. Presents with hypoxia.    1) onc- NSCLC. liver mets. Poor PS.   not a ctx candidate at this time.  can consider outpt rx , if there is improvement.  - palliative care following  -prognosis is poor  - family has opted for comfort measures    2) AMS. likely related to hypercalcemia. malignancy and infection could also be playing a role  - mgmt of hypercalcemia as per med.  consider aggressive hydration/diuresis, steroids, bisphosphonates, calcitonin  - consider mri of brain to r/o mets    3) heme- anemia, leukocytosis. likely related to malig.  monitor cbc and manage supportively.    4)coagulopathy- h/o rx with coumadin. also has liver mets.  vit k given.  f/u coags    5) Resp- mgmt as per pulm    on Friday 7/14 I had a lengthy conversation with the patients daughter regarding her mothers condition and overall poor prognosis. She was unaware of the extent and severity of her mother's condition and voiced appreciation that I discussed with her the above findings .  Currently, i explained, pt is not a ctx candidate.  Perhaps with improvement in MS, management of hypercalcemia, that could change, but even then, patient's prognosis with this disease is quite poor.  f/u with palliative care.  mgmt as per medicine.   i can reached via [phone]        Family has opted for comfort care at this time.  Will sign off, please reconsult as needed

## 2023-07-16 NOTE — PROGRESS NOTE ADULT - PROBLEM SELECTOR PLAN 3
Patient with recently diagnosed lung cancer  - extensive smoking history, recently quit  - recent admission 6/2023 at 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   - palliative care consulted and spoke to patient's daughter with Dr. Hernandez, please see [facility] note from 7/13 for full details of discussion.   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   - palliative care consulted and spoke to patient's daughter with Dr. Hernandez, please see [facility] note from 7/13 for full details of discussion.   - Multiple discussions with patient's daughter, pulm team - decision made to pursue comfort measures  - Increase Dilaudid 0.5mg to q3h PRN severe pain and add standing Dilaudid 0.2mg q6h ATC  - Started on ativan but daughter reports that patient has adverse effects and patient sometimes becomes more agitated, states hydroxyzine works well for her.   - Consider inpatient hospice, will discuss with Pall Care tomorrow

## 2023-07-16 NOTE — PROGRESS NOTE ADULT - PROBLEM SELECTOR PLAN 2
Patient with tachycardia, tachypnea, leukocytosis, fever, in the setting of likely post obstructive PNA  - s/p vanc and zosyn in the ED  - likely obstructive PNA from large R hilar mass obstructing the mainstem bronchus  - continue with IV zosyn, ID recs appreciated   - Daughter would like to continue antibiotics for now

## 2023-07-16 NOTE — PROGRESS NOTE ADULT - PROBLEM SELECTOR PLAN 10
DVT ppx: No AC, SCDs  Dispo: consider inpatient hospice    Called patient's daughter Zhanna ([phone]), no answer DVT ppx: No AC, SCDs  Dispo: consider inpatient hospice    Communication: called patient's daughter Zhanna ([phone]) and updated. DVT ppx: No AC, SCDs  Dispo: consider inpatient hospice    Communication: called patient's daughter Zhanna ([phone]) and updated. Emotional support provided. DVT ppx: No AC, SCDs  Dispo: consider inpatient hospice    Communication: called patient's daughter Zhanna ([phone]) and updated. Emotional support provided. Discussed that patient's condition is deteriorating, Zhanna states she physically and emotionally is not able to come to hospital to see patient. DVT ppx: No AC, SCDs  Dispo: consider inpatient hospice    Communication: called patient's daughter Zhanna ([phone]) and updated. Emotional support provided. Discussed that patient's condition is deteriorating, Zhanna states she physically and emotionally is not able to come to hospital to see patient. She is agreeable with inpatient hospice if that is an option and would prefer facility in [facility] if possible. Daughter requesting not to be called with updates unless patient were to pass away or she is to be transferred to inpatient hospice.

## 2023-07-16 NOTE — PROGRESS NOTE ADULT - ASSESSMENT
73F with newly diagnosed metastatic lung cancer, AVR (on Jantoven), T2DM, COPD, HTN, hypothyroidism, HFpEF and depression who presents from [place] for respiratory distress and hypoxia, found to have sepsis and acute hypoxemic respiratory failure likely 2/2 from post-obstructive pneumonia for R lung mass. Now goal of comfort.

## 2023-07-16 NOTE — PROGRESS NOTE ADULT - PROBLEM SELECTOR PLAN 1
Likely in the setting of large R hilar mass obstructing the mainstem bronchus seen on CTA Chest, negative for PE   - found to be hypoxic, on 6L NC  - C/w Zosyn for post-obstructive PNA   - aggressive pulmonary toilet: chest PT, duo-neb, hypersal  - Multiple discussions with patient's daughter, pulm team - decision made to pursue comfort measures and no plans for bronchoscopy/intervention, but would like to continue antibiotics for now   - Per collateral from pulm, NO stent was placed at [facility].    - Increase Dilaudid 0.5mg to q3h PRN severe pain and add standing Dilaudid 0.2mg q6h ATC

## 2023-07-16 NOTE — CHART NOTE - NSCHARTNOTEFT_GEN_A_CORE
Notified by RN that pt's HR increased to 150s-170s on tele.   Pt seen bedside. Noted to be comfortably sleeping, RN had administered PRN Dilaudid prior to writer arrival.   Tele reviewed. Rhythm notable for SVT. HR noted to be trending in 120s-140s. Briefly increased to 170 but not sustained.   Given standing IV metoprolol as ordered.   Pt is comfort care, tele not in line with GOC.   Telemetry discontinued, will CTM.  Discussed with Dr. Last. Notified by RN that pt's HR increased to 150s-170s on tele.   Pt seen bedside. Noted to be comfortably sleeping, RN had administered PRN Dilaudid prior to writer arrival.   Tele reviewed. Rhythm notable for SVT. HR noted to be trending in 120s-140s. Briefly increased to 170 but not sustained.   Given standing IV metoprolol as ordered.   Pt is comfort care, tele not in line with GOC.   Telemetry discontinued, will CTM.  Palliative f/u in AM for medication management.   Discussed with Dr. Last.

## 2023-07-16 NOTE — PROGRESS NOTE ADULT - PROBLEM SELECTOR PLAN 7
Patient with hx of mechanical aortic valve replacement  - on JANTOVEN at home, per daughter she CANNOT take coumadin/warfarin. She has her own meds and it was being dispensed at [facility]. Obtained meds from [facility], now at bedside  - INR supratherapeutic at 5.77 on admission, s/p IV Vit K 10mg x 1, now subtherapeutic.   - After discussion with patient's daughter, decision was made to not continue heparin gtt or Jantoven as this requires frequent monitoring and blood draws and not in line with comfort.

## 2023-07-16 NOTE — PROGRESS NOTE ADULT - SUBJECTIVE AND OBJECTIVE BOX
[facility] Division of Hospital Medicine  Naomi Last MD  Available on [employer] TEAMS    SUBJECTIVE / OVERNIGHT EVENTS: Patient seen and examined. Nodding yes when ask if she is in pain - otherwise patient is very restless. Per RN at bedside, complaining of pain, pain medication only provides relief for about 1hr or 30 min and then patient again complains of pain.       MEDICATIONS  (STANDING):  albuterol/ipratropium for Nebulization 3 milliLiter(s) Nebulizer every 6 hours  dextrose 5% + sodium chloride 0.45%. 1000 milliLiter(s) (75 mL/Hr) IV Continuous <Continuous>  levothyroxine Injectable 20 MICROGram(s) IV Push at bedtime  metoprolol tartrate Injectable 5 milliGRAM(s) IV Push every 6 hours  pantoprazole  Injectable 40 milliGRAM(s) IV Push daily  piperacillin/tazobactam IVPB.. 3.375 Gram(s) IV Intermittent every 8 hours  polyethylene glycol 3350 17 Gram(s) Oral daily  sodium chloride 3%  Inhalation 4 milliLiter(s) Inhalation every 6 hours  sodium chloride 7% Inhalation 4 milliLiter(s) Inhalation every 12 hours    MEDICATIONS  (PRN):  acetaminophen     Tablet .. 650 milliGRAM(s) Oral every 6 hours PRN Temp greater or equal to 38C (100.4F), Mild Pain (1 - 3)  guaiFENesin Oral Liquid (Sugar-Free) 100 milliGRAM(s) Oral every 6 hours PRN Cough  HYDROmorphone  Injectable 0.5 milliGRAM(s) IV Push every 3 hours PRN Severe Pain (7 - 10)  LORazepam   Injectable 0.25 milliGRAM(s) IV Push every 6 hours PRN Anxiety      I&O's Summary    15 Jul 2023 07:01  -  16 Jul 2023 07:00  --------------------------------------------------------  IN: 0 mL / OUT: 350 mL / NET: -350 mL        PHYSICAL EXAM:  Vital Signs Last 24 Hrs  T(C): 36.2 (16 Jul 2023 10:15), Max: 36.6 (15 Jul 2023 22:48)  T(F): 97.1 (16 Jul 2023 10:15), Max: 97.8 (15 Jul 2023 22:48)  HR: 116 (16 Jul 2023 10:15) (95 - 124)  BP: 143/72 (16 Jul 2023 10:15) (122/74 - 162/73)  BP(mean): --  RR: 20 (16 Jul 2023 10:15) (18 - 20)  SpO2: 97% (16 Jul 2023 10:15) (93% - 99%)    Parameters below as of 16 Jul 2023 10:15  Patient On (Oxygen Delivery Method): nasal cannula  O2 Flow (L/min): 6    CONSTITUTIONAL: ill appearing female in moderate resp distress   EYES: Conjunctiva and sclera clear  ENMT: dry oral mucosa  RESPIRATORY: Increased resp effort; + decreased BS on R side. Scattered course breath sounds   CARDIOVASCULAR: +tachycardia, systolic murmur; no LE edema   ABDOMEN: Soft, Nontender, Nondistended; Bowel sounds present  MUSCULOSKELETAL:  No clubbing or cyanosis of digits; No joint swelling or tenderness to palpation  PSYCH: AAOx1, restless, fidgeting, not answering questions, moaning in pain    LABS:                    SARS-CoV-2: NotDetec (12 Jul 2023 14:30)      RADIOLOGY & ADDITIONAL TESTS:  New Results Reviewed Today:   New Imaging Personally Reviewed Today:  New Electrocardiogram Personally Reviewed Today:  Prior or Outpatient Records Reviewed Today:    COMMUNICATION:  Care Discussed with Consultants/Other Providers and Details of Discussion:  Discussions with Patient/Family:  PCP Communication:     [facility] Division of Hospital Medicine  Naomi Last MD  Available on Microsoft TEAMS    SUBJECTIVE / OVERNIGHT EVENTS: Patient seen and examined. Nodding yes when ask if she is in pain - otherwise patient is very restless. Per RN at bedside, complaining of pain, pain medication only provides relief for about 1hr or 30 min and then patient again complains of pain.       MEDICATIONS  (STANDING):  albuterol/ipratropium for Nebulization 3 milliLiter(s) Nebulizer every 6 hours  dextrose 5% + sodium chloride 0.45%. 1000 milliLiter(s) (75 mL/Hr) IV Continuous <Continuous>  levothyroxine Injectable 20 MICROGram(s) IV Push at bedtime  metoprolol tartrate Injectable 5 milliGRAM(s) IV Push every 6 hours  pantoprazole  Injectable 40 milliGRAM(s) IV Push daily  piperacillin/tazobactam IVPB.. 3.375 Gram(s) IV Intermittent every 8 hours  polyethylene glycol 3350 17 Gram(s) Oral daily  sodium chloride 3%  Inhalation 4 milliLiter(s) Inhalation every 6 hours  sodium chloride 7% Inhalation 4 milliLiter(s) Inhalation every 12 hours    MEDICATIONS  (PRN):  acetaminophen     Tablet .. 650 milliGRAM(s) Oral every 6 hours PRN Temp greater or equal to 38C (100.4F), Mild Pain (1 - 3)  guaiFENesin Oral Liquid (Sugar-Free) 100 milliGRAM(s) Oral every 6 hours PRN Cough  HYDROmorphone  Injectable 0.5 milliGRAM(s) IV Push every 3 hours PRN Severe Pain (7 - 10)  LORazepam   Injectable 0.25 milliGRAM(s) IV Push every 6 hours PRN Anxiety      I&O's Summary    15 Jul 2023 07:01  -  16 Jul 2023 07:00  --------------------------------------------------------  IN: 0 mL / OUT: 350 mL / NET: -350 mL        PHYSICAL EXAM:  Vital Signs Last 24 Hrs  T(C): 36.2 (16 Jul 2023 10:15), Max: 36.6 (15 Jul 2023 22:48)  T(F): 97.1 (16 Jul 2023 10:15), Max: 97.8 (15 Jul 2023 22:48)  HR: 116 (16 Jul 2023 10:15) (95 - 124)  BP: 143/72 (16 Jul 2023 10:15) (122/74 - 162/73)  BP(mean): --  RR: 20 (16 Jul 2023 10:15) (18 - 20)  SpO2: 97% (16 Jul 2023 10:15) (93% - 99%)    Parameters below as of 16 Jul 2023 10:15  Patient On (Oxygen Delivery Method): nasal cannula  O2 Flow (L/min): 6    CONSTITUTIONAL: ill appearing female in moderate resp distress   EYES: Conjunctiva and sclera clear  ENMT: dry oral mucosa  RESPIRATORY: Increased resp effort; + decreased BS on R side. Scattered course breath sounds   CARDIOVASCULAR: +tachycardia, systolic murmur; no LE edema   ABDOMEN: Soft, Nontender, Nondistended; Bowel sounds present  MUSCULOSKELETAL:  No clubbing or cyanosis of digits; No joint swelling or tenderness to palpation  PSYCH: AAOx1, restless, fidgeting, not answering questions, moaning in pain      SARS-CoV-2: NotDetec (12 Jul 2023 14:30)      RADIOLOGY & ADDITIONAL TESTS:  New Results Reviewed Today:   New Imaging Personally Reviewed Today:  New Electrocardiogram Personally Reviewed Today:  Prior or Outpatient Records Reviewed Today:    COMMUNICATION:  Care Discussed with Consultants/Other Providers and Details of Discussion:  Discussions with Patient/Family:  PCP Communication:

## 2023-07-17 ENCOUNTER — TRANSCRIPTION ENCOUNTER (OUTPATIENT)
Age: 73
End: 2023-07-17

## 2023-07-17 DIAGNOSIS — R68.89 OTHER GENERAL SYMPTOMS AND SIGNS: ICD-10-CM

## 2023-07-17 LAB
CULTURE RESULTS: SIGNIFICANT CHANGE UP
CULTURE RESULTS: SIGNIFICANT CHANGE UP
SPECIMEN SOURCE: SIGNIFICANT CHANGE UP
SPECIMEN SOURCE: SIGNIFICANT CHANGE UP

## 2023-07-17 PROCEDURE — 99232 SBSQ HOSP IP/OBS MODERATE 35: CPT

## 2023-07-17 PROCEDURE — 99233 SBSQ HOSP IP/OBS HIGH 50: CPT

## 2023-07-17 RX ORDER — ROBINUL 0.2 MG/ML
0.4 INJECTION INTRAMUSCULAR; INTRAVENOUS
Qty: 0 | Refills: 0 | DISCHARGE
Start: 2023-07-17

## 2023-07-17 RX ORDER — HYDROMORPHONE HYDROCHLORIDE 2 MG/ML
0.5 INJECTION INTRAMUSCULAR; INTRAVENOUS; SUBCUTANEOUS
Refills: 0 | Status: DISCONTINUED | OUTPATIENT
Start: 2023-07-17 | End: 2023-07-20

## 2023-07-17 RX ORDER — METOPROLOL TARTRATE 50 MG
5 TABLET ORAL
Qty: 0 | Refills: 0 | DISCHARGE
Start: 2023-07-17

## 2023-07-17 RX ORDER — ROBINUL 0.2 MG/ML
0.4 INJECTION INTRAMUSCULAR; INTRAVENOUS EVERY 6 HOURS
Refills: 0 | Status: DISCONTINUED | OUTPATIENT
Start: 2023-07-17 | End: 2023-07-20

## 2023-07-17 RX ORDER — IPRATROPIUM/ALBUTEROL SULFATE 18-103MCG
3 AEROSOL WITH ADAPTER (GRAM) INHALATION
Qty: 0 | Refills: 0 | DISCHARGE
Start: 2023-07-17

## 2023-07-17 RX ORDER — ROBINUL 0.2 MG/ML
0.4 INJECTION INTRAMUSCULAR; INTRAVENOUS EVERY 6 HOURS
Refills: 0 | Status: DISCONTINUED | OUTPATIENT
Start: 2023-07-17 | End: 2023-07-17

## 2023-07-17 RX ORDER — HYDROMORPHONE HYDROCHLORIDE 2 MG/ML
0.5 INJECTION INTRAMUSCULAR; INTRAVENOUS; SUBCUTANEOUS
Qty: 0 | Refills: 0 | DISCHARGE
Start: 2023-07-17

## 2023-07-17 RX ADMIN — ROBINUL 0.4 MILLIGRAM(S): 0.2 INJECTION INTRAMUSCULAR; INTRAVENOUS at 23:19

## 2023-07-17 RX ADMIN — HYDROMORPHONE HYDROCHLORIDE 0.2 MILLIGRAM(S): 2 INJECTION INTRAMUSCULAR; INTRAVENOUS; SUBCUTANEOUS at 06:58

## 2023-07-17 RX ADMIN — SODIUM CHLORIDE 4 MILLILITER(S): 9 INJECTION INTRAMUSCULAR; INTRAVENOUS; SUBCUTANEOUS at 03:54

## 2023-07-17 RX ADMIN — HYDROMORPHONE HYDROCHLORIDE 0.2 MILLIGRAM(S): 2 INJECTION INTRAMUSCULAR; INTRAVENOUS; SUBCUTANEOUS at 01:50

## 2023-07-17 RX ADMIN — PIPERACILLIN AND TAZOBACTAM 25 GRAM(S): 4; .5 INJECTION, POWDER, LYOPHILIZED, FOR SOLUTION INTRAVENOUS at 09:31

## 2023-07-17 RX ADMIN — PIPERACILLIN AND TAZOBACTAM 25 GRAM(S): 4; .5 INJECTION, POWDER, LYOPHILIZED, FOR SOLUTION INTRAVENOUS at 17:31

## 2023-07-17 RX ADMIN — PIPERACILLIN AND TAZOBACTAM 25 GRAM(S): 4; .5 INJECTION, POWDER, LYOPHILIZED, FOR SOLUTION INTRAVENOUS at 00:57

## 2023-07-17 RX ADMIN — SODIUM CHLORIDE 4 MILLILITER(S): 9 INJECTION INTRAMUSCULAR; INTRAVENOUS; SUBCUTANEOUS at 16:00

## 2023-07-17 RX ADMIN — HYDROMORPHONE HYDROCHLORIDE 0.2 MILLIGRAM(S): 2 INJECTION INTRAMUSCULAR; INTRAVENOUS; SUBCUTANEOUS at 23:19

## 2023-07-17 RX ADMIN — POLYETHYLENE GLYCOL 3350 17 GRAM(S): 17 POWDER, FOR SOLUTION ORAL at 11:59

## 2023-07-17 RX ADMIN — Medication 3 MILLILITER(S): at 03:53

## 2023-07-17 RX ADMIN — SODIUM CHLORIDE 4 MILLILITER(S): 9 INJECTION INTRAMUSCULAR; INTRAVENOUS; SUBCUTANEOUS at 22:19

## 2023-07-17 RX ADMIN — ROBINUL 0.4 MILLIGRAM(S): 0.2 INJECTION INTRAMUSCULAR; INTRAVENOUS at 17:35

## 2023-07-17 RX ADMIN — HYDROMORPHONE HYDROCHLORIDE 0.2 MILLIGRAM(S): 2 INJECTION INTRAMUSCULAR; INTRAVENOUS; SUBCUTANEOUS at 12:00

## 2023-07-17 RX ADMIN — HYDROMORPHONE HYDROCHLORIDE 0.2 MILLIGRAM(S): 2 INJECTION INTRAMUSCULAR; INTRAVENOUS; SUBCUTANEOUS at 12:30

## 2023-07-17 RX ADMIN — Medication 5 MILLIGRAM(S): at 23:19

## 2023-07-17 RX ADMIN — Medication 3 MILLILITER(S): at 15:59

## 2023-07-17 RX ADMIN — HYDROMORPHONE HYDROCHLORIDE 0.2 MILLIGRAM(S): 2 INJECTION INTRAMUSCULAR; INTRAVENOUS; SUBCUTANEOUS at 06:02

## 2023-07-17 RX ADMIN — HYDROMORPHONE HYDROCHLORIDE 0.2 MILLIGRAM(S): 2 INJECTION INTRAMUSCULAR; INTRAVENOUS; SUBCUTANEOUS at 00:57

## 2023-07-17 RX ADMIN — Medication 5 MILLIGRAM(S): at 11:59

## 2023-07-17 RX ADMIN — SODIUM CHLORIDE 4 MILLILITER(S): 9 INJECTION INTRAMUSCULAR; INTRAVENOUS; SUBCUTANEOUS at 22:17

## 2023-07-17 RX ADMIN — HYDROMORPHONE HYDROCHLORIDE 0.2 MILLIGRAM(S): 2 INJECTION INTRAMUSCULAR; INTRAVENOUS; SUBCUTANEOUS at 18:05

## 2023-07-17 RX ADMIN — HYDROMORPHONE HYDROCHLORIDE 0.2 MILLIGRAM(S): 2 INJECTION INTRAMUSCULAR; INTRAVENOUS; SUBCUTANEOUS at 17:35

## 2023-07-17 RX ADMIN — Medication 20 MICROGRAM(S): at 22:33

## 2023-07-17 RX ADMIN — PANTOPRAZOLE SODIUM 40 MILLIGRAM(S): 20 TABLET, DELAYED RELEASE ORAL at 11:59

## 2023-07-17 RX ADMIN — Medication 3 MILLILITER(S): at 22:16

## 2023-07-17 RX ADMIN — Medication 5 MILLIGRAM(S): at 17:32

## 2023-07-17 RX ADMIN — Medication 5 MILLIGRAM(S): at 06:02

## 2023-07-17 NOTE — PROGRESS NOTE ADULT - PROBLEM SELECTOR PLAN 5
s/p IVF (gentle given comorbidities) S/p calcitonin and pamidronate   - Goal is comfort, will not trend labs

## 2023-07-17 NOTE — PROGRESS NOTE ADULT - PROBLEM SELECTOR PLAN 1
Likely in the setting of large R hilar mass obstructing the mainstem bronchus seen on CTA Chest, negative for PE   - found to be hypoxic, remains on NC, wean as able  - C/w Zosyn for post-obstructive PNA   - aggressive pulmonary toilet: chest PT, duo-neb, hypersal  - Multiple discussions with patient's daughter, pulm team - decision made to pursue comfort measures and no plans for bronchoscopy/intervention, but would like to continue antibiotics for now   - Per collateral from pulm, NO stent was placed at [facility].    - c/w pain control w/ dilaudid, monitor closely

## 2023-07-17 NOTE — PROGRESS NOTE ADULT - SUBJECTIVE AND OBJECTIVE BOX
[facility] Geriatrics and Palliative Care  Melissa Hernandez Palliative Care Attending  Contact Info: Page [phone] (including Nights/Weekends), message on Microsoft Teams (Melissa Hernandez), or leave  at Palliative Office [phone] (non-urgent)   Date of [id number] @ 13:15    SUBJECTIVE AND OBJECTIVE: Patient seen this AM. No family at bedside. Patient just states "Zhanna" when you speak to her. She appears dyspneic and has audible secretions.     Indication for Geriatrics and Palliative Care Services/INTERVAL HPI: sx management and goc     OVERNIGHT EVENTS:  > 7/14: Over the past 24 hours, patient required PRNs of IV dilaudid 0.2mg x2.   > 7/17: Over the weekend, patient was made full comfort and plan is to transition to inpatient hospice. Over the the past 24 hours, patient is on ATC IV dilaudid 0.2mg q6hr, 0.5mg IV dilaudid x2, 0.25mg IV ativan x2.     DNR on chart:DNI    Allergies    morphine (Unknown)    Intolerances    MEDICATIONS  (STANDING):  albuterol/ipratropium for Nebulization 3 milliLiter(s) Nebulizer every 6 hours  HYDROmorphone  Injectable 0.2 milliGRAM(s) IV Push every 6 hours  levothyroxine Injectable 20 MICROGram(s) IV Push at bedtime  metoprolol tartrate Injectable 5 milliGRAM(s) IV Push every 6 hours  pantoprazole  Injectable 40 milliGRAM(s) IV Push daily  piperacillin/tazobactam IVPB.. 3.375 Gram(s) IV Intermittent every 8 hours  polyethylene glycol 3350 17 Gram(s) Oral daily  sodium chloride 3%  Inhalation 4 milliLiter(s) Inhalation every 6 hours  sodium chloride 7% Inhalation 4 milliLiter(s) Inhalation every 12 hours    MEDICATIONS  (PRN):  acetaminophen     Tablet .. 650 milliGRAM(s) Oral every 6 hours PRN Temp greater or equal to 38C (100.4F), Mild Pain (1 - 3)  glycopyrrolate Injectable 0.4 milliGRAM(s) IV Push every 6 hours PRN copious oral secretions  guaiFENesin Oral Liquid (Sugar-Free) 100 milliGRAM(s) Oral every 6 hours PRN Cough  HYDROmorphone  Injectable 0.5 milliGRAM(s) IV Push every 3 hours PRN Severe Pain (7 - 10)  hydrOXYzine hydrochloride Injectable 25 milliGRAM(s) IntraMuscular every 6 hours PRN Anxiety  LORazepam   Injectable 0.25 milliGRAM(s) IV Push every 6 hours PRN Anxiety      ITEMS UNCHECKED ARE NOT PRESENT    PRESENT SYMPTOMS: [x ]Unable to self-report - see [ ] CPOT [x ] PAINADS [x ] RDOS  Source if other than patient:  [ ]Family   [x ]Team     Pain:  [ ]yes [ ]no  QOL impact -   Location -                    Aggravating factors -  Quality -  Radiation -  Timing-  Severity (0-10 scale):  Minimal acceptable level/ pain goal (0-10 scale):     CPOT:    https://www.[facility].org/getattachment/yvm56x65-8u6f-6c8r-2r3x-1581k5935e5y/Critical-Care-Pain-Observation-Tool-(CPOT)    Dyspnea:                           [ ]Mild [ ]Moderate [ ]Severe  Anxiety:                             [ ]Mild [ ]Moderate [ ]Severe  Fatigue:                             [ ]Mild [ ]Moderate [ ]Severe  Nausea:                             [ ]Mild [ ]Moderate [ ]Severe  Loss of appetite:              [ ]Mild [ ]Moderate [ ]Severe  Constipation:                    [ ]Mild [ ]Moderate [ ]Severe  Other Symptoms:  [ ]All other review of systems negative     PCSSQ[Palliative Care Spiritual Screening Question]   Severity (0-10):  Score of 4 or > indicate consideration of Chaplaincy referral.  Chaplaincy Referral: [ ] yes [ ] refused [ ] following [x ] deferred    Caregiver [facility]? : [ x] yes [ ] no [ ] Deferred [ ] Declined             Social work referral [ x] Patient & Family Centered Care Referral [ ]  Anticipatory Grief present?:  [ x] yes [ ] no  [ ] Deferred                  Social work referral [x ] Patient & Family Centered Care Referral [ ]      PHYSICAL EXAM:  Vital Signs Last 24 Hrs  T(C): 36.7 (17 Jul 2023 12:00), Max: 36.9 (17 Jul 2023 06:00)  T(F): 98.1 (17 Jul 2023 12:00), Max: 98.4 (17 Jul 2023 06:00)  HR: 115 (17 Jul 2023 12:00) (65 - 158)  BP: 112/62 (17 Jul 2023 12:00) (85/50 - 149/62)  BP(mean): --  RR: 18 (17 Jul 2023 12:00) (18 - 20)  SpO2: 94% (17 Jul 2023 12:00) (94% - 100%)    Parameters below as of 17 Jul 2023 12:00  Patient On (Oxygen Delivery Method): nasal cannula  O2 Flow (L/min): 2   I&O's Summary    16 Jul 2023 07:01  -  17 Jul 2023 07:00  --------------------------------------------------------  IN: 0 mL / OUT: 400 mL / NET: -400 mL       GENERAL: [ ]Cachexia    [x ]Alert  [ x]Oriented x 0   [ ]Lethargic  [ ]Unarousable  [x ]Verbal - just says "Zhanna" [ ]Non-Verbal  Behavioral:   [ ] Anxiety  [ ] Delirium [ ] Agitation [x ] Other- restless in bed  HEENT:  [ ]Normal   [x ]Dry mouth   [ ]ET Tube/Trach  [ ]Oral lesions  PULMONARY:   [ ]Clear [ x]Tachypnea  [x ]Audible excessive secretions   [ ]Rhonchi        [ ]Right [ ]Left [ ]Bilateral  [ ]Crackles        [ ]Right [ ]Left [ ]Bilateral  [ ]Wheezing     [ ]Right [ ]Left [ ]Bilateral  [ ]Diminished breath sounds [ ]right [ ]left [ ]bilateral  CARDIOVASCULAR:    [ ]Regular [ ]Irregular [ x]Tachy  [ ]Deion [ ]Murmur [ ]Other  GASTROINTESTINAL:  [x ]Soft  [ ]Distended   [ ]+BS  [ ]Non tender [ ]Tender  [ ]Other [ ]PEG [ ]OGT/ NGT  Last BM: 7/16  GENITOURINARY:  [ ]Normal [x ] Incontinent   [ ]Oliguria/Anuria   [ ]Phan  MUSCULOSKELETAL:   [ ]Normal   [ x]Weakness  [ ]Bed/Wheelchair bound [ ]Edema  NEUROLOGIC:   [ ]No focal deficits  [ ]Cognitive impairment  [ ]Dysphagia [ ]Dysarthria [ ]Paresis [x ]Other   SKIN: Please see flowsheets   [ ]Normal  [ ]Rash  [ ]Other  [ ]Pressure ulcer(s)       Present on admission [ ]y [ ]n      LABS: n/a     RADIOLOGY & ADDITIONAL STUDIES: n/a     Protein Calorie Malnutrition Present: [ ]mild [ ]moderate [ ]severe [ ]underweight [ ]morbid obesity  https://www.andeal.org/vault/2440/web/files/ONC/Table_Clinical%20Characteristics%20to%20Document%20Malnutrition-White%20JV%20et%20al%202012.pdf    Height (cm): 157.5 (07-13-23 @ 06:08)  Weight (kg): 72.8 (07-13-23 @ 06:08)  BMI (kg/m2): 29.3 (07-13-23 @ 06:08)    [ ]PPSV2 < or = 30%  [ ]significant weight loss [ ]poor nutritional intake [ ]anasarca[ ]Artificial Nutrition    Other REFERRALS:  [ ]Hospice  [ ]Child Life  [ ]Social Work  [ ]Case management [ ]Holistic Therapy     Goals of Care Document:HEMA Mendoza (07-14-23 @ 17:07)  Goals of Care Conversation:   Participants:  · Participants  Patient; Family  · Child(ryan)  kami, and sister of patient    Advance Directives:  · Caregiver:  information could not be obtained    Conversation Discussion:  · Conversation  Treatment Options  · Conversation Details  discussed scheduled procedure at length with patient's daughter and aunt over the phone. Patient with dismal overall prognosis and procedure although possible would be high risk and likely offer limited long term benefit patient. After much discussion, decision made to cancel procedure and transition to full comfort care.relayed information to primary team and palliative care team    What Matters Most To Patient and Family:  · What matters most to patient and family  comfort    Personal Advance Directives Treatment Guidelines:   Treatment Guidelines:  · Decision Maker  Health Care Proxy  · Treatment Guidelines  DNR Order; Comfort measures only; No blood draws; Antibiotic trial    MOLST:  · Completed  14-Jul-2023  · Updated  14-Jul-2023      Electronic Signatures:  Tiburcio Mendoza)  (Signed 14-Jul-2023 17:10)  	Authored: Goals of Care Conversation, Personal Advance Directives Treatment Guidelines      Last Updated: 14-Jul-2023 17:10 by Tiburcio Mendoza)

## 2023-07-17 NOTE — DISCHARGE NOTE PROVIDER - HOSPITAL COURSE
73F with newly diagnosed metastatic lung cancer (previous prolonged hospital stay at [place]), AVR (on Jantoven NOT coumadin), T2DM, COPD, HTN, hypothyroidism, HFpEF and depression who presents from [place] for respiratory distress and hypoxia, found to have sepsis and acute hypoxemic respiratory failure likely 2/2 from post-obstructive pneumonia for R lung mass. On Zosyn for post-obstructive PNA. Plan was for IP and bronch/stent etc but after extensive discussions with onc and pall care, and given overall poor prognosis now goal of comfort care. 73F with newly diagnosed metastatic lung cancer (previous prolonged hospital stay at [facility]), AVR (on Jantoven NOT coumadin), T2DM, COPD, HTN, hypothyroidism, HFpEF and depression who presents from [place] for respiratory distress and hypoxia, found to have sepsis and acute hypoxemic respiratory failure likely 2/2 from post-obstructive pneumonia for R lung mass. On Zosyn for post-obstructive PNA. Plan was for IP and bronch/stent etc but after extensive discussions with onc and pall care, and given overall poor prognosis now goal of comfort care, passed away in the hospital.

## 2023-07-17 NOTE — PROGRESS NOTE ADULT - ASSESSMENT
73 year old Female with R sided lung cancer with metastasis to lung and liver, mechanical aortic valve on warfarin, ?HFrEF, T2DM, COPD, HTN, hypothyroidism, HLD, and depression who presented on 7/12 from [place] for hypoxia and respiratory distress. Palliative consulted for symptom management in setting of advanced malignancy.

## 2023-07-17 NOTE — PROGRESS NOTE ADULT - SUBJECTIVE AND OBJECTIVE BOX
Patient is a 73y old  Female who presents with a chief complaint of Respiratory distress (17 Jul 2023 11:35)    SUBJECTIVE / OVERNIGHT EVENTS: No acute events. Awake, eyes open, not providing subjective.    MEDICATIONS  (STANDING):  albuterol/ipratropium for Nebulization 3 milliLiter(s) Nebulizer every 6 hours  glycopyrrolate Injectable 0.4 milliGRAM(s) IV Push every 6 hours  HYDROmorphone  Injectable 0.2 milliGRAM(s) IV Push every 6 hours  levothyroxine Injectable 20 MICROGram(s) IV Push at bedtime  metoprolol tartrate Injectable 5 milliGRAM(s) IV Push every 6 hours  pantoprazole  Injectable 40 milliGRAM(s) IV Push daily  piperacillin/tazobactam IVPB.. 3.375 Gram(s) IV Intermittent every 8 hours  polyethylene glycol 3350 17 Gram(s) Oral daily  sodium chloride 3%  Inhalation 4 milliLiter(s) Inhalation every 6 hours  sodium chloride 7% Inhalation 4 milliLiter(s) Inhalation every 12 hours    MEDICATIONS  (PRN):  acetaminophen     Tablet .. 650 milliGRAM(s) Oral every 6 hours PRN Temp greater or equal to 38C (100.4F), Mild Pain (1 - 3)  guaiFENesin Oral Liquid (Sugar-Free) 100 milliGRAM(s) Oral every 6 hours PRN Cough  HYDROmorphone  Injectable 0.5 milliGRAM(s) IV Push every 3 hours PRN Severe Pain (7 - 10)  hydrOXYzine hydrochloride Injectable 25 milliGRAM(s) IntraMuscular every 6 hours PRN Anxiety  LORazepam   Injectable 0.25 milliGRAM(s) IV Push every 6 hours PRN Anxiety    CAPILLARY BLOOD GLUCOSE    I&O's Summary    16 Jul 2023 07:01  -  17 Jul 2023 07:00  --------------------------------------------------------  IN: 0 mL / OUT: 400 mL / NET: -400 mL    PHYSICAL EXAM:  Vital Signs Last 24 Hrs  T(C): 36.7 (17 Jul 2023 12:00), Max: 36.9 (17 Jul 2023 06:00)  T(F): 98.1 (17 Jul 2023 12:00), Max: 98.4 (17 Jul 2023 06:00)  HR: 115 (17 Jul 2023 12:00) (65 - 158)  BP: 112/62 (17 Jul 2023 12:00) (85/50 - 149/62)  BP(mean): --  RR: 18 (17 Jul 2023 12:00) (18 - 20)  SpO2: 94% (17 Jul 2023 12:00) (94% - 100%)    Parameters below as of 17 Jul 2023 12:00  Patient On (Oxygen Delivery Method): nasal cannula  O2 Flow (L/min): 2    CONSTITUTIONAL: NAD, laying in bed  EYES: conjunctiva and sclera clear  ENMT: dry oral mucosa  RESPIRATORY: course breath sounds throughout, no wheezing  CARDIOVASCULAR: Regular rate and rhythm, normal S1 and S2, No lower extremity edema; Peripheral pulses are 2+ bilaterally  ABDOMEN: soft, NT, ND, +BS  PSYCH: calm  SKIN: No rashes    LABS, RADIOLOGY & ADDITIONAL TESTS: Reviewed    COORDINATION OF CARE:  Care Discussed with Consultants/Other Providers [Y- Medicine ACP]   Patient is a 73y old  Female who presents with a chief complaint of Respiratory distress (17 Jul 2023 11:35)    SUBJECTIVE / OVERNIGHT EVENTS: No acute events. Awake, eyes open, not providing subjective.    MEDICATIONS  (STANDING):  albuterol/ipratropium for Nebulization 3 milliLiter(s) Nebulizer every 6 hours  glycopyrrolate Injectable 0.4 milliGRAM(s) IV Push every 6 hours  HYDROmorphone  Injectable 0.2 milliGRAM(s) IV Push every 6 hours  levothyroxine Injectable 20 MICROGram(s) IV Push at bedtime  metoprolol tartrate Injectable 5 milliGRAM(s) IV Push every 6 hours  pantoprazole  Injectable 40 milliGRAM(s) IV Push daily  piperacillin/tazobactam IVPB.. 3.375 Gram(s) IV Intermittent every 8 hours  polyethylene glycol 3350 17 Gram(s) Oral daily  sodium chloride 3%  Inhalation 4 milliLiter(s) Inhalation every 6 hours  sodium chloride 7% Inhalation 4 milliLiter(s) Inhalation every 12 hours    MEDICATIONS  (PRN):  acetaminophen     Tablet .. 650 milliGRAM(s) Oral every 6 hours PRN Temp greater or equal to 38C (100.4F), Mild Pain (1 - 3)  guaiFENesin Oral Liquid (Sugar-Free) 100 milliGRAM(s) Oral every 6 hours PRN Cough  HYDROmorphone  Injectable 0.5 milliGRAM(s) IV Push every 3 hours PRN Severe Pain (7 - 10)  hydrOXYzine hydrochloride Injectable 25 milliGRAM(s) IntraMuscular every 6 hours PRN Anxiety  LORazepam   Injectable 0.25 milliGRAM(s) IV Push every 6 hours PRN Anxiety    CAPILLARY BLOOD GLUCOSE    I&O's Summary    16 Jul 2023 07:01  -  17 Jul 2023 07:00  --------------------------------------------------------  IN: 0 mL / OUT: 400 mL / NET: -400 mL    PHYSICAL EXAM:  Vital Signs Last 24 Hrs  T(C): 36.7 (17 Jul 2023 12:00), Max: 36.9 (17 Jul 2023 06:00)  T(F): 98.1 (17 Jul 2023 12:00), Max: 98.4 (17 Jul 2023 06:00)  HR: 115 (17 Jul 2023 12:00) (65 - 158)  BP: 112/62 (17 Jul 2023 12:00) (85/50 - 149/62)  BP(mean): --  RR: 18 (17 Jul 2023 12:00) (18 - 20)  SpO2: 94% (17 Jul 2023 12:00) (94% - 100%)    Parameters below as of 17 Jul 2023 12:00  Patient On (Oxygen Delivery Method): nasal cannula  O2 Flow (L/min): 2    CONSTITUTIONAL: NAD, laying in bed  EYES: conjunctiva and sclera clear  ENMT: dry oral mucosa  RESPIRATORY: course breath sounds throughout, no wheezing  CARDIOVASCULAR: Regular rate and rhythm, normal S1 and S2, No lower extremity edema; Peripheral pulses are 2+ bilaterally  ABDOMEN: soft, NT, ND, +BS  PSYCH: calm  SKIN: No rashes    LABS, RADIOLOGY & ADDITIONAL TESTS: Reviewed    COORDINATION OF CARE:  Care Discussed with Consultants/Other Providers [Y- Medicine ACP, hospice MD]

## 2023-07-17 NOTE — PROGRESS NOTE ADULT - PROBLEM SELECTOR PLAN 3
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   - palliative care consulted and spoke to patient's daughter with Dr. Hernandez, please see [facility] note from 7/13 for full details of discussion.   - Multiple discussions with patient's daughter, pulm team - decision made to pursue comfort measures  - Increase Dilaudid 0.5mg to q3h PRN severe pain and add standing Dilaudid 0.2mg q6h ATC  - Started on ativan but daughter reports that patient has adverse effects and patient sometimes becomes more agitated, states hydroxyzine works well for her.   - follow up regarding possibility of inpatient hospice, appreciate DANUTA

## 2023-07-17 NOTE — PROGRESS NOTE ADULT - PROBLEM SELECTOR PLAN 7
Patient with hx of mechanical aortic valve replacement  - on JANTOVEN at home, per daughter she CANNOT take coumadin/warfarin. She has her own meds and it was being dispensed at [facility]. Obtained meds from [facility], now at bedside  - INR supratherapeutic at 5.77 on admission, s/p IV Vit K 10mg x 1, now subtherapeutic.   - After discussion with patient's daughter, decision was made to not continue heparin gtt or Jantoven as this requires frequent monitoring and blood draws and not in line with comfort.

## 2023-07-17 NOTE — PROGRESS NOTE ADULT - PROBLEM SELECTOR PLAN 7
Goal is for comfort.     Thank you for allowing us to participate in your patient's care. We will continue to follow with you. Please page [phone] for any q's or c's. The Geriatric and Palliative Medicine service has coverage 24 hours a day/ 7 days a week to provide medical recommendations regarding symptom management needs via telephone.    Melissa Hernandez D.O.   Palliative Medicine.

## 2023-07-17 NOTE — PROGRESS NOTE ADULT - PROBLEM SELECTOR PLAN 2
NGTD blood cultures x2, urine cx - NGTD (7/12)   On zosyn   Appreciate ID recs  > 7/14: Pt answers questions after extensive questioning. She is aware of her lung cancer but unable to elicit further information.

## 2023-07-17 NOTE — PROGRESS NOTE ADULT - ASSESSMENT
73F with newly diagnosed metastatic lung cancer, AVR (on Jantoven), T2DM, COPD, HTN, hypothyroidism, HFpEF and depression who presents from [place] for respiratory distress and hypoxia, found to have sepsis and acute hypoxemic respiratory failure likely 2/2 from post-obstructive pneumonia for R lung mass. Now goal of comfort.

## 2023-07-17 NOTE — DISCHARGE NOTE PROVIDER - NSDCCPCAREPLAN_GEN_ALL_CORE_FT
PRINCIPAL DISCHARGE DIAGNOSIS  Diagnosis: PNA (pneumonia)  Assessment and Plan of Treatment: treated with antibiotics  You are being discharged to inpatient hospice, it is recommended to focus on comfort measures and supportive care. Continue with medications prescribed for pain and other symptom control.

## 2023-07-17 NOTE — PROGRESS NOTE ADULT - PROBLEM SELECTOR PLAN 10
DVT ppx: No AC, SCDs  Dispo: consider inpatient hospice    Communication: prior clinician called patient's daughter Zhanna ([phone]) and updated. Emotional support provided. Discussed that patient's condition is deteriorating, Zhanna states she physically and emotionally is not able to come to hospital to see patient. She is agreeable with inpatient hospice if that is an option and would prefer facility in [facility] if possible. Daughter requesting not to be called with updates unless patient were to pass away or she is to be transferred to inpatient hospice.

## 2023-07-17 NOTE — DISCHARGE NOTE PROVIDER - NSDCMRMEDTOKEN_GEN_ALL_CORE_FT
amLODIPine 2.5 mg oral tablet: 1 tab(s) orally once a day (at bedtime)  droNABinol 2.5 mg oral capsule: 1 tab(s) orally 2 times a day  Entresto 24 mg-26 mg oral tablet: 1 tab(s) orally 2 times a day  Farxiga 10 mg oral tablet: 1 tab(s) orally once a day  fentaNYL 37.5 mcg/hr transdermal film, extended release: 1 patch transdermally every 3 days  gabapentin 600 mg oral tablet: 1 tab(s) orally once a day (at bedtime)  hydroCHLOROthiazide 25 mg oral tablet: 1 tab(s) orally once a day  HYDROmorphone 1 mg/mL oral liquid: 2 milligram(s) orally every 4 hours as needed for  severe pain  levalbuterol 0.63 mg/3 mL inhalation solution: 3 milliliter(s) by nebulizer every 8 hours as needed for  shortness of breath and/or wheezing  levothyroxine 25 mcg (0.025 mg) oral tablet: 1 tab(s) orally once a day  magnesium oxide 400 mg oral tablet: 1 tab(s) orally once a day  metFORMIN 500 mg oral tablet: 1 tab(s) orally 2 times a day  metoprolol tartrate 100 mg oral tablet: 1 tab(s) orally once a day  MiraLax oral powder for reconstitution: 17 gram(s) orally once a day  Protonix 40 mg oral delayed release tablet: 1 tab(s) orally once a day  rosuvastatin 40 mg oral tablet: 1 tab(s) orally once a day (at bedtime)  tiZANidine 4 mg oral tablet: 1 tab(s) orally every 12 hours  Verquvo 2.5 mg oral tablet: 1 tab(s) orally once a day  warfarin 5 mg oral tablet: 1 orally once a day  Zetia 10 mg oral tablet: 1 orally once a day (at bedtime)   glycopyrrolate 0.2 mg/mL injectable solution: 0.4 milligram(s) injectable every 6 hours  HYDROmorphone: 0.2 milligram(s) intravenous every 6 hours  HYDROmorphone: 0.5 milligram(s) intravenous every 3 hours as needed for respiratory distress, dyspnea, severe pain  ipratropium-albuterol 0.5 mg-2.5 mg/3 mL inhalation solution: 3 milliliter(s) inhaled every 6 hours  LORazepam: 0.25 milligram(s) intravenous every 6 hours as needed for anxiety  metoprolol tartrate 1 mg/mL injectable solution: 5 milligram(s) injectable every 6 hours   glycopyrrolate 0.2 mg/mL injectable solution: 0.4 milligram(s) injectable every 6 hours  HYDROmorphone: 0.5 milligram(s) intravenous every 6 hours  HYDROmorphone: 0.5 milligram(s) intravenous every 3 hours as needed for respiratory distress, dyspnea, severe pain  ipratropium-albuterol 0.5 mg-2.5 mg/3 mL inhalation solution: 3 milliliter(s) inhaled every 6 hours  LORazepam: 0.25 milligram(s) intravenous every 6 hours as needed for anxiety  metoprolol tartrate 1 mg/mL injectable solution: 5 milligram(s) injectable every 6 hours

## 2023-07-17 NOTE — PROGRESS NOTE ADULT - PROBLEM SELECTOR PLAN 3
On supplemental oxygen   Patient appears very dyspneic during encounter.   Appreciate pulmonary recs- Bronch aborted as patient very high risk. Pulm team spoke with daughter who requested comfort care.   CT shows obstructing right mainstem bronchus  IV dilaudid 0.5mg q3h prn dyspnea/respiratory distress

## 2023-07-17 NOTE — PROGRESS NOTE ADULT - PROBLEM SELECTOR PLAN 1
Per chart review, patient with recently diagnosed lung cancer at [facility] (6/2023).   > CTA/p (7/12): Findings suspicious for metastatic lung cancer. Large right hilar mass obstructing the right mainstem bronchus, as further described above, with bilateral pulmonary, judy and hepatic metastases. Indeterminate   bilateral adrenal thickening.  > Per chart review, patient has not started treatment yet.   > Appreciate oncology recs- at this time, patient with poor performance status, would not be a candidate for DMT unless there was improvement in functional status.  > Pt inpatient hospice appropriate- Reviewed goc from 7/16 from Dr. Last who spoke to the daughter about inpatient hospice transition.

## 2023-07-17 NOTE — PROGRESS NOTE ADULT - PROBLEM SELECTOR PLAN 4
Per I-stop, patient was on fentanyl patch 12mcg q72 hours at home with dilaudid solution 5mg prn   >Goal is for comfort and symptom directed care  > On IV dilaudid 0.2mg q6hr ATC   > Continue IV dilaudid 0.5mg q3h prn severe pain   > Bowel regimen while on opioids- dulcolax suppository 10mg qd prn as patient NPO  > narcan prn.

## 2023-07-17 NOTE — PROGRESS NOTE ADULT - PROBLEM SELECTOR PLAN 6
> 7/14: Extensive goc discussion with patient's daughter with medical attending. See separate goc note. DNR, plan for DNI after bronchoscopy. MOLST completed and placed in chart   Caregiver support referral  > 7/17: Reviewed weekend events. Case discussed with Unit SW for inpatient hospice. Referrals [facility] for [facility]. Per discussion with Dr. Last, inpatient hospice in [place] would be preferred.

## 2023-07-18 PROCEDURE — 99232 SBSQ HOSP IP/OBS MODERATE 35: CPT

## 2023-07-18 PROCEDURE — 99233 SBSQ HOSP IP/OBS HIGH 50: CPT

## 2023-07-18 RX ORDER — HYDROMORPHONE HYDROCHLORIDE 2 MG/ML
0.5 INJECTION INTRAMUSCULAR; INTRAVENOUS; SUBCUTANEOUS EVERY 6 HOURS
Refills: 0 | Status: DISCONTINUED | OUTPATIENT
Start: 2023-07-18 | End: 2023-07-20

## 2023-07-18 RX ORDER — ACETAMINOPHEN 500 MG
1000 TABLET ORAL ONCE
Refills: 0 | Status: COMPLETED | OUTPATIENT
Start: 2023-07-18 | End: 2023-07-19

## 2023-07-18 RX ADMIN — PANTOPRAZOLE SODIUM 40 MILLIGRAM(S): 20 TABLET, DELAYED RELEASE ORAL at 11:50

## 2023-07-18 RX ADMIN — POLYETHYLENE GLYCOL 3350 17 GRAM(S): 17 POWDER, FOR SOLUTION ORAL at 11:51

## 2023-07-18 RX ADMIN — HYDROMORPHONE HYDROCHLORIDE 0.2 MILLIGRAM(S): 2 INJECTION INTRAMUSCULAR; INTRAVENOUS; SUBCUTANEOUS at 07:19

## 2023-07-18 RX ADMIN — SODIUM CHLORIDE 4 MILLILITER(S): 9 INJECTION INTRAMUSCULAR; INTRAVENOUS; SUBCUTANEOUS at 03:34

## 2023-07-18 RX ADMIN — PIPERACILLIN AND TAZOBACTAM 25 GRAM(S): 4; .5 INJECTION, POWDER, LYOPHILIZED, FOR SOLUTION INTRAVENOUS at 00:56

## 2023-07-18 RX ADMIN — HYDROMORPHONE HYDROCHLORIDE 0.2 MILLIGRAM(S): 2 INJECTION INTRAMUSCULAR; INTRAVENOUS; SUBCUTANEOUS at 06:19

## 2023-07-18 RX ADMIN — Medication 5 MILLIGRAM(S): at 06:17

## 2023-07-18 RX ADMIN — Medication 3 MILLILITER(S): at 10:18

## 2023-07-18 RX ADMIN — SODIUM CHLORIDE 4 MILLILITER(S): 9 INJECTION INTRAMUSCULAR; INTRAVENOUS; SUBCUTANEOUS at 21:23

## 2023-07-18 RX ADMIN — ROBINUL 0.4 MILLIGRAM(S): 0.2 INJECTION INTRAMUSCULAR; INTRAVENOUS at 06:19

## 2023-07-18 RX ADMIN — Medication 3 MILLILITER(S): at 15:29

## 2023-07-18 RX ADMIN — Medication 3 MILLILITER(S): at 21:23

## 2023-07-18 RX ADMIN — HYDROMORPHONE HYDROCHLORIDE 0.2 MILLIGRAM(S): 2 INJECTION INTRAMUSCULAR; INTRAVENOUS; SUBCUTANEOUS at 00:19

## 2023-07-18 RX ADMIN — PIPERACILLIN AND TAZOBACTAM 25 GRAM(S): 4; .5 INJECTION, POWDER, LYOPHILIZED, FOR SOLUTION INTRAVENOUS at 17:30

## 2023-07-18 RX ADMIN — PIPERACILLIN AND TAZOBACTAM 25 GRAM(S): 4; .5 INJECTION, POWDER, LYOPHILIZED, FOR SOLUTION INTRAVENOUS at 08:57

## 2023-07-18 RX ADMIN — ROBINUL 0.4 MILLIGRAM(S): 0.2 INJECTION INTRAMUSCULAR; INTRAVENOUS at 11:50

## 2023-07-18 RX ADMIN — ROBINUL 0.4 MILLIGRAM(S): 0.2 INJECTION INTRAMUSCULAR; INTRAVENOUS at 17:33

## 2023-07-18 RX ADMIN — HYDROMORPHONE HYDROCHLORIDE 0.2 MILLIGRAM(S): 2 INJECTION INTRAMUSCULAR; INTRAVENOUS; SUBCUTANEOUS at 11:50

## 2023-07-18 RX ADMIN — HYDROMORPHONE HYDROCHLORIDE 0.5 MILLIGRAM(S): 2 INJECTION INTRAMUSCULAR; INTRAVENOUS; SUBCUTANEOUS at 18:03

## 2023-07-18 RX ADMIN — HYDROMORPHONE HYDROCHLORIDE 0.2 MILLIGRAM(S): 2 INJECTION INTRAMUSCULAR; INTRAVENOUS; SUBCUTANEOUS at 12:20

## 2023-07-18 RX ADMIN — Medication 5 MILLIGRAM(S): at 11:50

## 2023-07-18 RX ADMIN — SODIUM CHLORIDE 4 MILLILITER(S): 9 INJECTION INTRAMUSCULAR; INTRAVENOUS; SUBCUTANEOUS at 10:18

## 2023-07-18 RX ADMIN — Medication 3 MILLILITER(S): at 03:34

## 2023-07-18 RX ADMIN — SODIUM CHLORIDE 4 MILLILITER(S): 9 INJECTION INTRAMUSCULAR; INTRAVENOUS; SUBCUTANEOUS at 15:31

## 2023-07-18 RX ADMIN — HYDROMORPHONE HYDROCHLORIDE 0.5 MILLIGRAM(S): 2 INJECTION INTRAMUSCULAR; INTRAVENOUS; SUBCUTANEOUS at 17:33

## 2023-07-18 NOTE — PROGRESS NOTE ADULT - PROBLEM SELECTOR PLAN 10
DVT ppx: No AC, SCDs  Dispo: consider inpatient hospice    Communication: prior clinician called patient's daughter Zhanna ([phone]) and updated. Emotional support provided. Discussed that patient's condition is deteriorating, Zhanna states she physically and emotionally is not able to come to hospital to see patient. She is agreeable with inpatient hospice if that is an option and would prefer facility in [facility] if possible. Daughter requesting not to be called with updates unless patient were to pass away or she is to be transferred to inpatient hospice.

## 2023-07-18 NOTE — PROGRESS NOTE ADULT - PROBLEM SELECTOR PLAN 1
Per chart review, patient with recently diagnosed lung cancer at [facility] (6/2023).   > CTA/p (7/12): Findings suspicious for metastatic lung cancer. Large right hilar mass obstructing the right mainstem bronchus, as further described above, with bilateral pulmonary, judy and hepatic metastases. Indeterminate   bilateral adrenal thickening.  > Per chart review, patient has not started treatment yet.   > Appreciate oncology recs- at this time, patient with poor performance status, would not be a candidate for DMT unless there was improvement in functional status.  > Pt inpatient hospice appropriate- Reviewed goc from 7/16 from Dr. Last who spoke to the daughter about inpatient hospice transition.

## 2023-07-18 NOTE — PROGRESS NOTE ADULT - PROBLEM SELECTOR PLAN 3
On supplemental oxygen   Patient appears very dyspneic during encounter.   Appreciate pulmonary recs- Bronch aborted as patient very high risk. Pulm team spoke with daughter who requested comfort care.   CT shows obstructing right mainstem bronchus  Adjusted ATC dilaudid to 0.5mg q6h ATC   IV dilaudid 0.5mg q3h prn dyspnea/respiratory distress

## 2023-07-18 NOTE — PROGRESS NOTE ADULT - ASSESSMENT
73 year old Female with R sided lung cancer with metastasis to lung and liver, mechanical aortic valve on warfarin, ?HFrEF, T2DM, COPD, HTN, hypothyroidism, HLD, and depression who presented on 7/12 from [place] for hypoxia and respiratory distress. Palliative consulted for symptom management in setting of advanced malignancy.

## 2023-07-18 NOTE — PROGRESS NOTE ADULT - SUBJECTIVE AND OBJECTIVE BOX
[facility] Geriatrics and Palliative Care  Melissa Hernandez Palliative Care Attending  Contact Info: Page [phone] (including Nights/Weekends), message on Microsoft Teams (Melissa Hernandez), or leave  at Palliative Office [phone] (non-urgent)   Date of [id number] @ 13:49    SUBJECTIVE AND OBJECTIVE: Patient seen this AM awake but staring at ceiling, did not speak. She appeared tachypneic. Overall, patient declining and transitioned to EOL.     Indication for Geriatrics and Palliative Care Services/INTERVAL HPI: sx management     OVERNIGHT EVENTS:  > 7/14: Over the past 24 hours, patient required PRNs of IV dilaudid 0.2mg x2.   > 7/17: Over the weekend, patient was made full comfort and plan is to transition to inpatient hospice. Over the the past 24 hours, patient is on ATC IV dilaudid 0.2mg q6hr, 0.5mg IV dilaudid x2, 0.25mg IV ativan x2.   > 7/18: Over the past 24 hours, patient was medicated with her ATC dilaudid and glyco.     DNR on chart:DNI  DNI      Allergies    morphine (Unknown)    Intolerances    MEDICATIONS  (STANDING):  albuterol/ipratropium for Nebulization 3 milliLiter(s) Nebulizer every 6 hours  glycopyrrolate Injectable 0.4 milliGRAM(s) IV Push every 6 hours  HYDROmorphone  Injectable 0.5 milliGRAM(s) IV Push every 6 hours  levothyroxine Injectable 20 MICROGram(s) IV Push at bedtime  metoprolol tartrate Injectable 5 milliGRAM(s) IV Push every 6 hours  pantoprazole  Injectable 40 milliGRAM(s) IV Push daily  piperacillin/tazobactam IVPB.. 3.375 Gram(s) IV Intermittent every 8 hours  polyethylene glycol 3350 17 Gram(s) Oral daily  sodium chloride 3%  Inhalation 4 milliLiter(s) Inhalation every 6 hours  sodium chloride 7% Inhalation 4 milliLiter(s) Inhalation every 12 hours    MEDICATIONS  (PRN):  acetaminophen     Tablet .. 650 milliGRAM(s) Oral every 6 hours PRN Temp greater or equal to 38C (100.4F), Mild Pain (1 - 3)  guaiFENesin Oral Liquid (Sugar-Free) 100 milliGRAM(s) Oral every 6 hours PRN Cough  HYDROmorphone  Injectable 0.5 milliGRAM(s) IV Push every 3 hours PRN Severe Pain (7 - 10)  HYDROmorphone  Injectable 0.5 milliGRAM(s) IV Push every 3 hours PRN dyspnea, respiratory distress  hydrOXYzine hydrochloride Injectable 25 milliGRAM(s) IntraMuscular every 6 hours PRN Anxiety  LORazepam   Injectable 0.25 milliGRAM(s) IV Push every 6 hours PRN Anxiety      ITEMS UNCHECKED ARE NOT PRESENT    PRESENT SYMPTOMS: [x ]Unable to self-report - see [ ] CPOT [ x] PAINADS [x ] RDOS  Source if other than patient:  [ ]Family   [x ]Team     Pain:  [ ]yes [ ]no  QOL impact -   Location -                    Aggravating factors -  Quality -  Radiation -  Timing-  Severity (0-10 scale):  Minimal acceptable level/ pain goal (0-10 scale):     CPOT:    https://www.[facility].org/getattachment/[email or web address]/Critical-Care-Pain-Observation-Tool-(CPOT)    Dyspnea:                           [ ]Mild [ ]Moderate [ ]Severe  Anxiety:                             [ ]Mild [ ]Moderate [ ]Severe  Fatigue:                             [ ]Mild [ ]Moderate [ ]Severe  Nausea:                             [ ]Mild [ ]Moderate [ ]Severe  Loss of appetite:              [ ]Mild [ ]Moderate [ ]Severe  Constipation:                    [ ]Mild [ ]Moderate [ ]Severe  Other Symptoms:  [ ]All other review of systems negative     PCSSQ[Palliative Care Spiritual Screening Question]   Severity (0-10):  Score of 4 or > indicate consideration of Chaplaincy referral.  Chaplaincy Referral: [ ] yes [ ] refused [ ] following [x ] deferred    Caregiver Kismet? : [x ] yes [ ] no [ ] Deferred [ ] Declined             Social work referral [ ] Patient & Family Centered Care Referral [ ]  Anticipatory Grief present?:  [ x] yes [ ] no  [ ] Deferred                  Social work referral [ ] Patient & Family Centered Care Referral [ ]      PHYSICAL EXAM:  Vital Signs Last 24 Hrs  T(C): 36.5 (18 Jul 2023 11:45), Max: 37.4 (17 Jul 2023 17:00)  T(F): 97.7 (18 Jul 2023 11:45), Max: 99.3 (17 Jul 2023 17:00)  HR: 116 (18 Jul 2023 11:45) (65 - 118)  BP: 109/55 (18 Jul 2023 11:45) (95/40 - 130/38)  BP(mean): --  RR: 18 (18 Jul 2023 11:45) (18 - 18)  SpO2: 92% (18 Jul 2023 11:45) (92% - 97%)    Parameters below as of 18 Jul 2023 11:45  Patient On (Oxygen Delivery Method): nasal cannula  O2 Flow (L/min): 3   I&O's Summary     GENERAL: [ ]Cachexia    [x ]Alert  [ x]Oriented x 0   [ ]Lethargic  [ ]Unarousable  [x ]Verbal - just says "Zhanna" [ ]Non-Verbal  Behavioral:   [ ] Anxiety  [ ] Delirium [ ] Agitation [x ] Other- restless in bed  HEENT:  [ ]Normal   [x ]Dry mouth   [ ]ET Tube/Trach  [ ]Oral lesions  PULMONARY:   [ ]Clear [ x]Tachypnea  [x ]Audible excessive secretions   [ ]Rhonchi        [ ]Right [ ]Left [ ]Bilateral  [ ]Crackles        [ ]Right [ ]Left [ ]Bilateral  [ ]Wheezing     [ ]Right [ ]Left [ ]Bilateral  [ ]Diminished breath sounds [ ]right [ ]left [ ]bilateral  CARDIOVASCULAR:    [ ]Regular [ ]Irregular [ x]Tachy  [ ]Deion [ ]Murmur [ ]Other  GASTROINTESTINAL:  [x ]Soft  [ ]Distended   [ ]+BS  [ ]Non tender [ ]Tender  [ ]Other [ ]PEG [ ]OGT/ NGT  Last BM: 7/16  GENITOURINARY:  [ ]Normal [x ] Incontinent   [ ]Oliguria/Anuria   [ ]Phan  MUSCULOSKELETAL:   [ ]Normal   [ x]Weakness  [ ]Bed/Wheelchair bound [ ]Edema  NEUROLOGIC:   [ ]No focal deficits  [ ]Cognitive impairment  [ ]Dysphagia [ ]Dysarthria [ ]Paresis [x ]Other   SKIN: Please see flowsheets   [ ]Normal  [ ]Rash  [ ]Other  [ ]Pressure ulcer(s)       Present on admission [ ]y [ ]n    LABS: n/a     RADIOLOGY & ADDITIONAL STUDIES: n/a     Protein Calorie Malnutrition Present: [ ]mild [ ]moderate [ ]severe [ ]underweight [ ]morbid obesity  https://www.andeal.org/vault/2440/web/files/ONC/Table_Clinical%20Characteristics%20to%20Document%20Malnutrition-White%20JV%20et%20al%202012.pdf    Height (cm): 157.5 (07-13-23 @ 06:08)  Weight (kg): 72.8 (07-13-23 @ 06:08)  BMI (kg/m2): 29.3 (07-13-23 @ 06:08)    [x ]PPSV2 < or = 30%  [ ]significant weight loss [ ]poor nutritional intake [ ]anasarca[ ]Artificial Nutrition    Other REFERRALS:  [ x]Hospice  [ ]Child Life  [ ]Social Work  [ ]Case management [ ]Holistic Therapy    pap smear contraindicated

## 2023-07-18 NOTE — PROGRESS NOTE ADULT - SUBJECTIVE AND OBJECTIVE BOX
Patient is a 73y old  Female who presents with a chief complaint of Respiratory distress (18 Jul 2023 13:47)    SUBJECTIVE / OVERNIGHT EVENTS: No acute events. Awake, not providing detailed subjective.     MEDICATIONS  (STANDING):  albuterol/ipratropium for Nebulization 3 milliLiter(s) Nebulizer every 6 hours  glycopyrrolate Injectable 0.4 milliGRAM(s) IV Push every 6 hours  HYDROmorphone  Injectable 0.5 milliGRAM(s) IV Push every 6 hours  levothyroxine Injectable 20 MICROGram(s) IV Push at bedtime  metoprolol tartrate Injectable 5 milliGRAM(s) IV Push every 6 hours  pantoprazole  Injectable 40 milliGRAM(s) IV Push daily  piperacillin/tazobactam IVPB.. 3.375 Gram(s) IV Intermittent every 8 hours  polyethylene glycol 3350 17 Gram(s) Oral daily  sodium chloride 3%  Inhalation 4 milliLiter(s) Inhalation every 6 hours  sodium chloride 7% Inhalation 4 milliLiter(s) Inhalation every 12 hours    MEDICATIONS  (PRN):  acetaminophen     Tablet .. 650 milliGRAM(s) Oral every 6 hours PRN Temp greater or equal to 38C (100.4F), Mild Pain (1 - 3)  guaiFENesin Oral Liquid (Sugar-Free) 100 milliGRAM(s) Oral every 6 hours PRN Cough  HYDROmorphone  Injectable 0.5 milliGRAM(s) IV Push every 3 hours PRN Severe Pain (7 - 10)  HYDROmorphone  Injectable 0.5 milliGRAM(s) IV Push every 3 hours PRN dyspnea, respiratory distress  hydrOXYzine hydrochloride Injectable 25 milliGRAM(s) IntraMuscular every 6 hours PRN Anxiety  LORazepam   Injectable 0.25 milliGRAM(s) IV Push every 6 hours PRN Anxiety    CAPILLARY BLOOD GLUCOSE    I&O's Summary    PHYSICAL EXAM:  Vital Signs Last 24 Hrs  T(C): 36.5 (18 Jul 2023 11:45), Max: 37.4 (17 Jul 2023 17:00)  T(F): 97.7 (18 Jul 2023 11:45), Max: 99.3 (17 Jul 2023 17:00)  HR: 116 (18 Jul 2023 11:45) (65 - 118)  BP: 109/55 (18 Jul 2023 11:45) (95/40 - 130/38)  BP(mean): --  RR: 18 (18 Jul 2023 11:45) (18 - 18)  SpO2: 92% (18 Jul 2023 11:45) (92% - 97%)    Parameters below as of 18 Jul 2023 11:45  Patient On (Oxygen Delivery Method): nasal cannula  O2 Flow (L/min): 3    CONSTITUTIONAL: NAD, well-developed, well-groomed  EYES: PERRLA; conjunctiva and sclera clear  ENMT: Moist oral mucosa, no pharyngeal injection or exudates; normal dentition  NECK: Supple, no palpable masses; no thyromegaly  RESPIRATORY: Normal respiratory effort; lungs are clear to auscultation bilaterally  CARDIOVASCULAR: Regular rate and rhythm, normal S1 and S2, no murmur/rub/gallop; No lower extremity edema; Peripheral pulses are 2+ bilaterally  ABDOMEN: Nontender to palpation, normoactive bowel sounds, no rebound/guarding; No hepatosplenomegaly  MUSCULOSKELETAL:  Normal gait; no clubbing or cyanosis of digits; no joint swelling or tenderness to palpation  PSYCH: A+O to person, place, and time; affect appropriate  NEUROLOGY: CN 2-12 are intact and symmetric; no gross sensory deficits   SKIN: No rashes; no palpable lesions    LABS, RADIOLOGY & ADDITIONAL TESTS: Reviewed    COORDINATION OF CARE:  Care Discussed with Consultants/Other Providers [Y- medicine ACP, CM/ SW] Patient is a 73y old  Female who presents with a chief complaint of Respiratory distress (18 Jul 2023 13:47)    SUBJECTIVE / OVERNIGHT EVENTS: No acute events. Awake, not providing detailed subjective.     MEDICATIONS  (STANDING):  albuterol/ipratropium for Nebulization 3 milliLiter(s) Nebulizer every 6 hours  glycopyrrolate Injectable 0.4 milliGRAM(s) IV Push every 6 hours  HYDROmorphone  Injectable 0.5 milliGRAM(s) IV Push every 6 hours  levothyroxine Injectable 20 MICROGram(s) IV Push at bedtime  metoprolol tartrate Injectable 5 milliGRAM(s) IV Push every 6 hours  pantoprazole  Injectable 40 milliGRAM(s) IV Push daily  piperacillin/tazobactam IVPB.. 3.375 Gram(s) IV Intermittent every 8 hours  polyethylene glycol 3350 17 Gram(s) Oral daily  sodium chloride 3%  Inhalation 4 milliLiter(s) Inhalation every 6 hours  sodium chloride 7% Inhalation 4 milliLiter(s) Inhalation every 12 hours    MEDICATIONS  (PRN):  acetaminophen     Tablet .. 650 milliGRAM(s) Oral every 6 hours PRN Temp greater or equal to 38C (100.4F), Mild Pain (1 - 3)  guaiFENesin Oral Liquid (Sugar-Free) 100 milliGRAM(s) Oral every 6 hours PRN Cough  HYDROmorphone  Injectable 0.5 milliGRAM(s) IV Push every 3 hours PRN Severe Pain (7 - 10)  HYDROmorphone  Injectable 0.5 milliGRAM(s) IV Push every 3 hours PRN dyspnea, respiratory distress  hydrOXYzine hydrochloride Injectable 25 milliGRAM(s) IntraMuscular every 6 hours PRN Anxiety  LORazepam   Injectable 0.25 milliGRAM(s) IV Push every 6 hours PRN Anxiety    CAPILLARY BLOOD GLUCOSE    I&O's Summary    PHYSICAL EXAM:  Vital Signs Last 24 Hrs  T(C): 36.5 (18 Jul 2023 11:45), Max: 37.4 (17 Jul 2023 17:00)  T(F): 97.7 (18 Jul 2023 11:45), Max: 99.3 (17 Jul 2023 17:00)  HR: 116 (18 Jul 2023 11:45) (65 - 118)  BP: 109/55 (18 Jul 2023 11:45) (95/40 - 130/38)  BP(mean): --  RR: 18 (18 Jul 2023 11:45) (18 - 18)  SpO2: 92% (18 Jul 2023 11:45) (92% - 97%)    Parameters below as of 18 Jul 2023 11:45  Patient On (Oxygen Delivery Method): nasal cannula  O2 Flow (L/min): 3    CONSTITUTIONAL: NAD, laying in bed  EYES: conjunctiva and sclera clear  ENMT: dry oral mucosa  RESPIRATORY: course breath sounds throughout, no wheezing  CARDIOVASCULAR: Regular rate and rhythm, normal S1 and S2, No lower extremity edema; Peripheral pulses are 2+ bilaterally  ABDOMEN: soft, NT, ND, +BS  PSYCH: calm  SKIN: No rashes    LABS, RADIOLOGY & ADDITIONAL TESTS: Reviewed    COORDINATION OF CARE:  Care Discussed with Consultants/Other Providers [Y- medicine ACP, CM/ SW]

## 2023-07-18 NOTE — PROGRESS NOTE ADULT - PROBLEM SELECTOR PLAN 7
Patient with hx of mechanical aortic valve replacement  - on JANTOVEN at home, per daughter she CANNOT take coumadin/warfarin. She has her own meds and it was being dispensed at [facility]. Obtained meds from [facility], now at bedside  - INR supratherapeutic at 5.77 on admission, s/p IV Vit K 10mg x 1, now subtherapeutic.   - After discussion with patient's daughter, decision was made to not continue heparin gtt or Jantoven as this requires frequent monitoring and blood draws and not in line with comfort.

## 2023-07-18 NOTE — PROGRESS NOTE ADULT - PROBLEM SELECTOR PLAN 2
NGTD blood cultures x2, urine cx - NGTD (7/12)   On zosyn   Appreciate ID recs  > 7/14: Pt answers questions after extensive questioning. She is aware of her lung cancer but unable to elicit further information.  > 7/18: patient declining overall

## 2023-07-18 NOTE — PROGRESS NOTE ADULT - PROBLEM SELECTOR PLAN 4
Per I-stop, patient was on fentanyl patch 12mcg q72 hours at home with dilaudid solution 5mg prn   >Goal is for comfort and symptom directed care  > Increased IV dilaudid 0.5mg q6hr ATC   > Continue IV dilaudid 0.5mg q3h prn severe pain   > Bowel regimen while on opioids- dulcolax suppository 10mg qd prn as patient NPO  > narcan prn.

## 2023-07-18 NOTE — PROGRESS NOTE ADULT - PROBLEM SELECTOR PLAN 2
Patient with tachycardia, tachypnea, leukocytosis, fever, in the setting of likely post obstructive PNA  - s/p vanc and zosyn in the ED  - likely obstructive PNA from large R hilar mass obstructing the mainstem bronchus  - continue with IV zosyn, ID recs appreciated, ending tomorrow  - Daughter would like to continue antibiotics for now

## 2023-07-18 NOTE — PROGRESS NOTE ADULT - ASSESSMENT
73F with newly diagnosed metastatic lung cancer, AVR (on Jantoven), T2DM, COPD, HTN, hypothyroidism, HFpEF and depression who presents from [place] for respiratory distress and hypoxia, found to have sepsis and acute hypoxemic respiratory failure likely 2/2 from post-obstructive pneumonia for R lung mass. Now goal of comfort.

## 2023-07-18 NOTE — PROGRESS NOTE ADULT - NS ATTEST RISK PROBLEM GEN_ALL_CORE FT
acute hypoxemic respiratory failure/malignant central airway obstruction
In the setting of parenteral controlled substance administration, clinical monitoring required for side effects such as respiratory depression, constipation and opioid induced neurotoxicity.  This patient is at [x ]high [ ] medium [ ] low risk due to advanced malignancy.     [ x] The patient is receiving IV and has required  escalation for uncontrolled symptoms.  [ ] To taper and monitor for emergence of symptoms.  [ ] Symptoms controlled with present regimen.
In the setting of parenteral controlled substance administration, clinical monitoring required for side effects such as respiratory depression, constipation and opioid induced neurotoxicity.  This patient is at [x ]high [ ] medium [ ] low risk due to advanced malignancy.     [ x] The patient is receiving IV and has required  escalation for uncontrolled symptoms.  [ ] To taper and monitor for emergence of symptoms.  [ ] Symptoms controlled with present regimen.

## 2023-07-18 NOTE — PROGRESS NOTE ADULT - PROBLEM SELECTOR PLAN 6
> 7/14: Extensive goc discussion with patient's daughter with medical attending. See separate goc note. DNR, plan for DNI after bronchoscopy. MOLST completed and placed in chart   Caregiver support referral  > 7/17: Reviewed weekend events. Case discussed with Unit SW for inpatient hospice. Referrals placed for [facility]. Per discussion with Dr. Last, inpatient hospice in [place] would be preferred.  > 7/18: Case discussed with Unit SW- awaiting response from [facility]

## 2023-07-18 NOTE — PROGRESS NOTE ADULT - PROBLEM SELECTOR PLAN 1
Likely in the setting of large R hilar mass obstructing the mainstem bronchus seen on CTA Chest, negative for PE   - found to be hypoxic, remains on NC, wean as able  - completing Zosyn for post-obstructive PNA   - aggressive pulmonary toilet: chest PT, duo-neb, hypersal  - Multiple discussions with patient's daughter, pulm team - decision made to pursue comfort measures and no plans for bronchoscopy/intervention, but would like to continue antibiotics for now   - Per collateral from pulm, NO stent was placed at [facility].    - c/w pain control w/ dilaudid, monitor closely

## 2023-07-18 NOTE — PROGRESS NOTE ADULT - PROBLEM SELECTOR PLAN 7
Goal is for comfort.     Thank you for allowing us to participate in your patient's care. We will continue to follow with you. Please page [phone] for any q's or c's. The Geriatric and Palliative Medicine service has coverage 24 hours a day/ 7 days a week to provide medical recommendations regarding symptom management needs via telephone.    Melissa Hernandez D.O.   Palliative Medicine.

## 2023-07-18 NOTE — PROGRESS NOTE ADULT - PROBLEM SELECTOR PLAN 3
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   - palliative care consulted and spoke to patient's daughter with Dr. Hernandez, please see [facility] note from 7/13 for full details of discussion.   - Multiple discussions with patient's daughter, pulm team - decision made to pursue comfort measures  - Increase Dilaudid 0.5mg to q3h PRN severe pain and add standing Dilaudid 0.2mg q6h ATC  - Started on ativan but daughter reports that patient has adverse effects and patient sometimes becomes more agitated, states hydroxyzine works well for her.   - follow up regarding possibility of inpatient hospice, appreciate DANUTA

## 2023-07-19 LAB — SARS-COV-2 RNA SPEC QL NAA+PROBE: SIGNIFICANT CHANGE UP

## 2023-07-19 PROCEDURE — 99232 SBSQ HOSP IP/OBS MODERATE 35: CPT

## 2023-07-19 RX ORDER — ACETAMINOPHEN 500 MG
650 TABLET ORAL ONCE
Refills: 0 | Status: COMPLETED | OUTPATIENT
Start: 2023-07-19 | End: 2023-07-19

## 2023-07-19 RX ORDER — ACETAMINOPHEN 500 MG
1000 TABLET ORAL ONCE
Refills: 0 | Status: COMPLETED | OUTPATIENT
Start: 2023-07-19 | End: 2023-07-19

## 2023-07-19 RX ADMIN — Medication 400 MILLIGRAM(S): at 00:10

## 2023-07-19 RX ADMIN — Medication 1000 MILLIGRAM(S): at 01:00

## 2023-07-19 RX ADMIN — ROBINUL 0.4 MILLIGRAM(S): 0.2 INJECTION INTRAMUSCULAR; INTRAVENOUS at 23:25

## 2023-07-19 RX ADMIN — PIPERACILLIN AND TAZOBACTAM 25 GRAM(S): 4; .5 INJECTION, POWDER, LYOPHILIZED, FOR SOLUTION INTRAVENOUS at 08:06

## 2023-07-19 RX ADMIN — Medication 3 MILLILITER(S): at 03:59

## 2023-07-19 RX ADMIN — POLYETHYLENE GLYCOL 3350 17 GRAM(S): 17 POWDER, FOR SOLUTION ORAL at 11:30

## 2023-07-19 RX ADMIN — PANTOPRAZOLE SODIUM 40 MILLIGRAM(S): 20 TABLET, DELAYED RELEASE ORAL at 11:30

## 2023-07-19 RX ADMIN — Medication 3 MILLILITER(S): at 22:10

## 2023-07-19 RX ADMIN — Medication 650 MILLIGRAM(S): at 06:00

## 2023-07-19 RX ADMIN — PIPERACILLIN AND TAZOBACTAM 25 GRAM(S): 4; .5 INJECTION, POWDER, LYOPHILIZED, FOR SOLUTION INTRAVENOUS at 00:11

## 2023-07-19 RX ADMIN — SODIUM CHLORIDE 4 MILLILITER(S): 9 INJECTION INTRAMUSCULAR; INTRAVENOUS; SUBCUTANEOUS at 10:09

## 2023-07-19 RX ADMIN — HYDROMORPHONE HYDROCHLORIDE 0.5 MILLIGRAM(S): 2 INJECTION INTRAMUSCULAR; INTRAVENOUS; SUBCUTANEOUS at 11:30

## 2023-07-19 RX ADMIN — HYDROMORPHONE HYDROCHLORIDE 0.5 MILLIGRAM(S): 2 INJECTION INTRAMUSCULAR; INTRAVENOUS; SUBCUTANEOUS at 18:34

## 2023-07-19 RX ADMIN — ROBINUL 0.4 MILLIGRAM(S): 0.2 INJECTION INTRAMUSCULAR; INTRAVENOUS at 17:52

## 2023-07-19 RX ADMIN — HYDROMORPHONE HYDROCHLORIDE 0.5 MILLIGRAM(S): 2 INJECTION INTRAMUSCULAR; INTRAVENOUS; SUBCUTANEOUS at 23:38

## 2023-07-19 RX ADMIN — Medication 3 MILLILITER(S): at 10:07

## 2023-07-19 RX ADMIN — ROBINUL 0.4 MILLIGRAM(S): 0.2 INJECTION INTRAMUSCULAR; INTRAVENOUS at 05:41

## 2023-07-19 RX ADMIN — HYDROMORPHONE HYDROCHLORIDE 0.5 MILLIGRAM(S): 2 INJECTION INTRAMUSCULAR; INTRAVENOUS; SUBCUTANEOUS at 17:54

## 2023-07-19 RX ADMIN — PIPERACILLIN AND TAZOBACTAM 25 GRAM(S): 4; .5 INJECTION, POWDER, LYOPHILIZED, FOR SOLUTION INTRAVENOUS at 17:51

## 2023-07-19 RX ADMIN — ROBINUL 0.4 MILLIGRAM(S): 0.2 INJECTION INTRAMUSCULAR; INTRAVENOUS at 11:30

## 2023-07-19 RX ADMIN — Medication 1000 MILLIGRAM(S): at 19:26

## 2023-07-19 RX ADMIN — HYDROMORPHONE HYDROCHLORIDE 0.5 MILLIGRAM(S): 2 INJECTION INTRAMUSCULAR; INTRAVENOUS; SUBCUTANEOUS at 12:31

## 2023-07-19 RX ADMIN — HYDROMORPHONE HYDROCHLORIDE 0.5 MILLIGRAM(S): 2 INJECTION INTRAMUSCULAR; INTRAVENOUS; SUBCUTANEOUS at 23:23

## 2023-07-19 RX ADMIN — Medication 20 MICROGRAM(S): at 00:10

## 2023-07-19 RX ADMIN — SODIUM CHLORIDE 4 MILLILITER(S): 9 INJECTION INTRAMUSCULAR; INTRAVENOUS; SUBCUTANEOUS at 15:31

## 2023-07-19 RX ADMIN — Medication 260 MILLIGRAM(S): at 05:41

## 2023-07-19 RX ADMIN — SODIUM CHLORIDE 4 MILLILITER(S): 9 INJECTION INTRAMUSCULAR; INTRAVENOUS; SUBCUTANEOUS at 04:00

## 2023-07-19 RX ADMIN — ROBINUL 0.4 MILLIGRAM(S): 0.2 INJECTION INTRAMUSCULAR; INTRAVENOUS at 00:11

## 2023-07-19 RX ADMIN — SODIUM CHLORIDE 4 MILLILITER(S): 9 INJECTION INTRAMUSCULAR; INTRAVENOUS; SUBCUTANEOUS at 22:10

## 2023-07-19 RX ADMIN — Medication 3 MILLILITER(S): at 15:30

## 2023-07-19 RX ADMIN — Medication 400 MILLIGRAM(S): at 18:30

## 2023-07-19 NOTE — PROGRESS NOTE ADULT - PROBLEM SELECTOR PLAN 1
Likely in the setting of large R hilar mass obstructing the mainstem bronchus seen on CTA Chest, negative for PE   - found to be hypoxic, remains on NC, wean as able  - completing Zosyn for post-obstructive PNA   - aggressive pulmonary toilet: chest PT, duo-neb, hypersal  - Multiple discussions with patient's daughter, pulm team - decision made to pursue comfort measures and no plans for bronchoscopy/intervention, but would like to continue antibiotics for now   - Per collateral from pulm, NO stent was placed at [facility].    - c/w pain control w/ dilaudid, monitor closely

## 2023-07-19 NOTE — PROGRESS NOTE ADULT - PROBLEM SELECTOR PLAN 7
Patient with hx of mechanical aortic valve replacement  - on JANTOVEN at home, per daughter she CANNOT take coumadin/warfarin. She has her own meds and it was being dispensed at [facility]. Obtained meds from [facility], now at bedside  - INR supratherapeutic at 5.77 on admission, s/p IV Vit K 10mg x 1, now subtherapeutic.   - After discussion with patient's daughter, decision was made to not continue heparin gtt or Jantoven as this requires frequent monitoring and blood draws and not in line with comfort.

## 2023-07-19 NOTE — PROVIDER CONTACT NOTE (OTHER) - SITUATION
repeat vitals s/p iv acetaminophen are: BP 86/40  axillary 101.1 oxygen saturation is 97% on 3L NC repeat vitals s/p iv acetaminophen are: BP 86/40  axillary temp was 100.5 oxygen saturation is 97% on 3L NC

## 2023-07-19 NOTE — PROVIDER CONTACT NOTE (OTHER) - BACKGROUND
Admitted for pneumonia.
pt admitted for pneumonia. PMH DM2, COPD, Lung cancer, atrial tachycardia, acute pain.
73yr old female admitted with PNA. Hx of depression, HLD, CHF, COPD, Lung CA, DMII.
pt admitted for pneumonia. PMH DM2, COPD, Lung cancer, atrial tachycardia, acute pain.
pt admitted for pneumonia. PMH DM2, COPD, Lung cancer, atrial tachycardia, acute pain.
73yr old female admitted with PNA. Hx of depression, HLD, CHF, COPD, Lung CA, DMII.
73yr old female admitted with PNA. Hx of depression, HLD, CHF, COPD, Lung CA, DMII.
Comfort measures. DNR/DNI. H/x of lung cancer. pending hospice.
Pt on comfort measures. Pain management ongoing.
73yr old female admitted with PNA. Hx of depression with anxiety, Hypothyroid, Lung CA with mets.
pt admitted for pneumonia. PMH DM2, COPD, Lung cancer, atrial tachycardia, acute pain.
pt admitted for pneumonia. PMH DM2, COPD, Lung cancer, atrial tachycardia, acute pain.

## 2023-07-19 NOTE — PROVIDER CONTACT NOTE (OTHER) - NAME OF MD/NP/PA/DO NOTIFIED:
Mojgan Mello
Puja Brady ACP
Gui [facility]
Lizzette Jackman
Jennifer Clemente, ACP
Catrina Crocker
Jennifer Clemente, ACP
Mojgan Mello
Catrina Crocker
Andrea Albert
Mojgan Mello
Mojgan Mello

## 2023-07-19 NOTE — PROGRESS NOTE ADULT - PROBLEM SELECTOR PLAN 10
DVT ppx: No AC, SCDs  Dispo: consider inpatient hospice    Communication: prior clinician called patient's daughter Zhanna ([phone]) and updated. Emotional support provided. Discussed that patient's condition is deteriorating, Zhanna states she physically and emotionally is not able to come to hospital to see patient. She is agreeable with inpatient hospice if that is an option and would prefer facility in [facility] if possible. Daughter requesting not to be called with updates unless patient were to pass away or she is to be transferred to inpatient hospice.

## 2023-07-19 NOTE — PROVIDER CONTACT NOTE (OTHER) - DATE AND TIME:
14-Jul-2023 09:58
14-Jul-2023 23:35
15-Jul-2023 05:55
13-Jul-2023 06:30
18-Jul-2023 23:40
16-Jul-2023 15:53
17-Jul-2023 09:45
18-Jul-2023 22:40
19-Jul-2023 05:11
19-Jul-2023 01:00
19-Jul-2023 10:34
19-Jul-2023 18:23

## 2023-07-19 NOTE — PROGRESS NOTE ADULT - SUBJECTIVE AND OBJECTIVE BOX
Patient is a 73y old  Female who presents with a chief complaint of Respiratory distress (18 Jul 2023 14:18)    SUBJECTIVE / OVERNIGHT EVENTS: No acute events. Awake, not appearing in pain, not providing subjective.     MEDICATIONS  (STANDING):  albuterol/ipratropium for Nebulization 3 milliLiter(s) Nebulizer every 6 hours  glycopyrrolate Injectable 0.4 milliGRAM(s) IV Push every 6 hours  HYDROmorphone  Injectable 0.5 milliGRAM(s) IV Push every 6 hours  levothyroxine Injectable 20 MICROGram(s) IV Push at bedtime  metoprolol tartrate Injectable 5 milliGRAM(s) IV Push every 6 hours  pantoprazole  Injectable 40 milliGRAM(s) IV Push daily  piperacillin/tazobactam IVPB.. 3.375 Gram(s) IV Intermittent every 8 hours  polyethylene glycol 3350 17 Gram(s) Oral daily  sodium chloride 3%  Inhalation 4 milliLiter(s) Inhalation every 6 hours    MEDICATIONS  (PRN):  acetaminophen     Tablet .. 650 milliGRAM(s) Oral every 6 hours PRN Temp greater or equal to 38C (100.4F), Mild Pain (1 - 3)  guaiFENesin Oral Liquid (Sugar-Free) 100 milliGRAM(s) Oral every 6 hours PRN Cough  HYDROmorphone  Injectable 0.5 milliGRAM(s) IV Push every 3 hours PRN dyspnea, respiratory distress  HYDROmorphone  Injectable 0.5 milliGRAM(s) IV Push every 3 hours PRN Severe Pain (7 - 10)  hydrOXYzine hydrochloride Injectable 25 milliGRAM(s) IntraMuscular every 6 hours PRN Anxiety  LORazepam   Injectable 0.25 milliGRAM(s) IV Push every 6 hours PRN Anxiety    CAPILLARY BLOOD GLUCOSE    I&O's Summary    PHYSICAL EXAM:  Vital Signs Last 24 Hrs  T(C): 37.7 (19 Jul 2023 12:57), Max: 38.1 (19 Jul 2023 01:00)  T(F): 99.9 (19 Jul 2023 12:57), Max: 100.5 (19 Jul 2023 01:00)  HR: 108 (19 Jul 2023 09:51) (51 - 121)  BP: 85/42 (19 Jul 2023 09:51) (82/42 - 106/55)  BP(mean): --  RR: 20 (19 Jul 2023 09:51) (18 - 21)  SpO2: 92% (19 Jul 2023 09:51) (91% - 98%)    Parameters below as of 19 Jul 2023 09:51  Patient On (Oxygen Delivery Method): nasal cannula  O2 Flow (L/min): 3    CONSTITUTIONAL: NAD, laying in bed  EYES: conjunctiva and sclera clear  ENMT: dry oral mucosa  RESPIRATORY: course breath sounds, no wheezing  CARDIOVASCULAR: Regular rate and rhythm, normal S1 and S2, No lower extremity edema; Peripheral pulses are 2+ bilaterally  ABDOMEN: soft, NT, ND, +BS  PSYCH: calm  SKIN: No rashes    LABS, RADIOLOGY & ADDITIONAL TESTS: Reviewed    COORDINATION OF CARE:  Care Discussed with Consultants/Other Providers [Y- Medicine ACP, CM/SW, Pall care]

## 2023-07-19 NOTE — PROVIDER CONTACT NOTE (OTHER) - ACTION/TREATMENT ORDERED:
JOHN Jackman made aware, IV Tylenol ordered for fever. Continue to monitor.
Provider notified. No new orders at this time.
provider notified. no new orders. comfort measures
ACP notified and aware. As per ACP, pt is on comfort measures. Ok to give pain medicine if the pt is moaning in pain. Pt was cleaned up and is now calm. Will continue to monitor.
provider notified. day team to update molst
Andrea Albert notified and NPO orders are in. Will continue to monitor. Safety maintained.
Provider notified. Ok to give next dose of Dilaudid early.
provider notified. hold Dilaudid and metoprolol. provider to order iv acetaminophen
ACP Augustina aware. Continue to monitor. Pain management on going; okay to give Dilaudid.
Provider notified, assess patient. EKG done. Metoprolol given.
Provider notified
provider notified. hold Dilaudid and metoprolol. will order iv Tylenol

## 2023-07-19 NOTE — PROGRESS NOTE ADULT - PROBLEM SELECTOR PLAN 3
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   - palliative care consulted and spoke to patient's daughter with Dr. Hernandez, please see [facility] note from 7/13 for full details of discussion.   - Multiple discussions with patient's daughter, pulm team - decision made to pursue comfort measures  - Increase Dilaudid 0.5mg to q3h PRN severe pain and add standing Dilaudid 0.2mg q6h ATC  - Started on ativan but daughter reports that patient has adverse effects and patient sometimes becomes more agitated, states hydroxyzine works well for her.   - follow up regarding possibility of inpatient hospice, appreciate DANUTA

## 2023-07-19 NOTE — PROVIDER CONTACT NOTE (OTHER) - RECOMMENDATIONS
Provider aware.
Provider notified
ACP Andrea Gray notified over the phone about failing dysphagia screening and not able to give morning meds. ACP recommended to hold meds for now for safety.
notify provider
notify provider
ACP made aware.
IV fluids
notify provider
Provider notified
Provider notified, awaiting recommendations.
notify provider
Provider notified

## 2023-07-19 NOTE — PROGRESS NOTE ADULT - ASSESSMENT
73F with newly diagnosed metastatic lung cancer, AVR (on Jantoven), T2DM, COPD, HTN, hypothyroidism, HFpEF and depression who presents from [place] for respiratory distress and hypoxia, found to have sepsis and acute hypoxemic respiratory failure likely 2/2 from post-obstructive pneumonia for R lung mass. Now goal of comfort.

## 2023-07-19 NOTE — PROVIDER CONTACT NOTE (OTHER) - SITUATION
Pt temperature 100.2, febrile since last night. IV Tylenol given at 5 am by night nurse. BP trending low 85/42.

## 2023-07-19 NOTE — PROGRESS NOTE ADULT - PROBLEM SELECTOR PLAN 2
Patient with tachycardia, tachypnea, leukocytosis, fever, in the setting of likely post obstructive PNA  - s/p vanc and zosyn in the ED  - likely obstructive PNA from large R hilar mass obstructing the mainstem bronchus  - continue with IV zosyn, ID recs appreciated, ending today. Low grade temp o/n. Continue to monitor.   - antibiotics within GOC for now per dtr

## 2023-07-19 NOTE — PROVIDER CONTACT NOTE (OTHER) - ASSESSMENT
A&OX0 moaning in pain. scheduled for Dilaudid
breakthrough pain, 8/10.pt not in acute distress.
A&OX0
A&OX0 no s/s of acute distress
pt is unable to verbalize symptoms. On assessment, pt looks in pain and uncomfortable. Next dose of pain medicine is due around 12pm. PRN is available to give. However, pts BP is 85/50. pt is not on IV fluids. HR is in 150's possible due to acute pain. pt is comfort measures.
No signs of distress noted. BP trending low; 89/40. Temperature at 100.6.
Pt vitals: BP 85/42, temperature 100.2. Pt comfort measures. No s/s of distress.
-170
/67 , pt not in acute distress. Due Metoprolol IV push given.
-170's - sustaining, pt asymptomatic
A&OX0
Patient AXO2. Patient failed dysphagia screening.

## 2023-07-20 VITALS
DIASTOLIC BLOOD PRESSURE: 30 MMHG | TEMPERATURE: 100 F | HEART RATE: 118 BPM | RESPIRATION RATE: 20 BRPM | SYSTOLIC BLOOD PRESSURE: 56 MMHG | OXYGEN SATURATION: 94 %

## 2023-07-20 PROCEDURE — 99232 SBSQ HOSP IP/OBS MODERATE 35: CPT

## 2023-07-20 RX ADMIN — SODIUM CHLORIDE 4 MILLILITER(S): 9 INJECTION INTRAMUSCULAR; INTRAVENOUS; SUBCUTANEOUS at 04:16

## 2023-07-20 RX ADMIN — HYDROMORPHONE HYDROCHLORIDE 0.5 MILLIGRAM(S): 2 INJECTION INTRAMUSCULAR; INTRAVENOUS; SUBCUTANEOUS at 06:45

## 2023-07-20 RX ADMIN — SODIUM CHLORIDE 4 MILLILITER(S): 9 INJECTION INTRAMUSCULAR; INTRAVENOUS; SUBCUTANEOUS at 09:35

## 2023-07-20 RX ADMIN — Medication 20 MICROGRAM(S): at 00:57

## 2023-07-20 RX ADMIN — Medication 3 MILLILITER(S): at 09:35

## 2023-07-20 RX ADMIN — Medication 3 MILLILITER(S): at 04:16

## 2023-07-20 RX ADMIN — ROBINUL 0.4 MILLIGRAM(S): 0.2 INJECTION INTRAMUSCULAR; INTRAVENOUS at 06:44

## 2023-07-20 RX ADMIN — HYDROMORPHONE HYDROCHLORIDE 0.5 MILLIGRAM(S): 2 INJECTION INTRAMUSCULAR; INTRAVENOUS; SUBCUTANEOUS at 06:31

## 2023-07-20 NOTE — PROGRESS NOTE ADULT - PROBLEM SELECTOR PLAN 1
Likely in the setting of large R hilar mass obstructing the mainstem bronchus seen on CTA Chest, negative for PE   - found to be hypoxic, remains on NC, wean as able  - completing Zosyn for post-obstructive PNA   - aggressive pulmonary toilet: chest PT, duo-neb, hypersal  - Multiple discussions with patient's daughter, pulm team - decision made to pursue comfort measures and no plans for bronchoscopy/intervention, but would like to continue antibiotics for now   - Per collateral from pulm, NO stent was placed at [facility].    - c/w pain control w/ dilaudid, monitor closely

## 2023-07-20 NOTE — PROGRESS NOTE ADULT - PROBLEM SELECTOR PROBLEM 2
Sepsis due to pneumonia
Acute respiratory failure with hypoxia
Sepsis due to pneumonia

## 2023-07-20 NOTE — PROGRESS NOTE ADULT - PROBLEM SELECTOR PROBLEM 6
Atrial tachycardia
Advanced care planning/counseling discussion
Atrial tachycardia
Atrial tachycardia
Advanced care planning/counseling discussion
History of mechanical aortic valve replacement
Atrial tachycardia
Encounter for palliative care
Atrial tachycardia

## 2023-07-20 NOTE — PROGRESS NOTE ADULT - PROBLEM SELECTOR PROBLEM 8
Heart failure with reduced ejection fraction
Hyperlipidemia
Heart failure with reduced ejection fraction

## 2023-07-20 NOTE — DISCHARGE NOTE FOR THE EXPIRED PATIENT - HOSPITAL COURSE
73F with newly diagnosed metastatic lung cancer, AVR (on Jantoven), T2DM, COPD, HTN, hypothyroidism, HFpEF and depression who presents from [place] for respiratory distress and hypoxia, found to have sepsis and acute hypoxemic respiratory failure likely 2/2 from post-obstructive pneumonia for R lung mass. Now goal of comfort.      Acute respiratory failure with hypoxia.   ·  Plan: Likely in the setting of large R hilar mass obstructing the mainstem bronchus seen on CTA Chest, negative for PE   - found to be hypoxic, remains on NC, wean as able  - completing Zosyn for post-obstructive PNA   - aggressive pulmonary toilet: chest PT, duo-neb, hypersal  - Multiple discussions with patient's daughter, pulm team - decision made to pursue comfort measures and no plans for bronchoscopy/intervention, but would like to continue antibiotics for now   - Per collateral from pulm, NO stent was placed at [facility].    - c/w pain control w/ dilaudid, monitor closely.    Sepsis due to pneumonia.   ·  Plan: Patient with tachycardia, tachypnea, leukocytosis, fever, in the setting of likely post obstructive PNA  - s/p vanc and zosyn in the ED  - likely obstructive PNA from large R hilar mass obstructing the mainstem bronchus  - continue with IV zosyn, ID recs appreciated, ending today. Low grade temp o/n. Continue to monitor.   - antibiotics within GO for now per dtr.     Metastatic primary lung cancer.   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   - palliative care consulted and spoke to patient's daughter with Dr. Hernandez, please see [facility] note from 7/13 for full details of discussion.   - Multiple discussions with patient's daughter, pulm team - decision made to pursue comfort measures  - Increase Dilaudid 0.5mg to q3h PRN severe pain and add standing Dilaudid 0.2mg q6h ATC  - Started on ativan but daughter reports that patient has adverse effects and patient sometimes becomes more agitated, states hydroxyzine works well for her.   - follow up regarding possibility of inpatient hospice, appreciate SW.    Acute metabolic encephalopathy.   ·  Plan: Patient reportedly AAOx3 at baseline, now AAOx1-2  - likely from sepsis encephalopathy vs. hypercalcemia   - continue to monitor, goal is comfort   - delirium precautions.    Hypercalcemia.   ·  Plan: s/p IVF (gentle given comorbidities) S/p calcitonin and pamidronate   - Goal is comfort, will not trend labs.     Atrial tachycardia.   ·  Plan: Atrial tachycardia noted to 150s  - C/w IV lopressor 5mg q6h standing   - Can give PRN lopressor 5mg IVP as needed.     History of mechanical aortic valve replacement.   ·  Plan: Patient with hx of mechanical aortic valve replacement  - on JANTOVEN at home, per daughter she CANNOT take coumadin/warfarin. She has her own meds and it was being dispensed at [facility]. Obtained meds from [facility], now at bedside  - INR supratherapeutic at 5.77 on admission, s/p IV Vit K 10mg x 1, now subtherapeutic.   - After discussion with patient's daughter, decision was made to not continue heparin gtt or Jantoven as this requires frequent monitoring and blood draws and not in line with comfort.      Heart failure with reduced ejection fraction.   ·  Plan: Patient without documented history of HFrEF; however on GDMT per Outpt medication review  - on Farxiga, Entresto, vericiguat, metoprolol at home  - repeat TTE showing grossly normal LVSF  - Will hold meds as patient is unable to take PO.     Advanced care planning/counseling discussion.   ·  Plan: - palliative care consulted and spoke to patient's daughter with Dr. Hernandez, please see [facility] note from 7/13 for full details of discussion.   - DNR/DNI  - Multiple discussions with patient's daughter, pulm team - decision made to pursue comfort measures  - Patient accepted for hospice but transportation cancelled secondary to patient condition worsening.        73F with newly diagnosed metastatic lung cancer, AVR (on Jantoven), T2DM, COPD, HTN, hypothyroidism, HFpEF and depression who presents from [place] for respiratory distress and hypoxia, found to have sepsis and acute hypoxemic respiratory failure likely 2/2 from post-obstructive pneumonia for R lung mass. Now goal of comfort.      Acute respiratory failure with hypoxia.   ·  Plan: Likely in the setting of large R hilar mass obstructing the mainstem bronchus seen on CTA Chest, negative for PE   - found to be hypoxic  - completing Zosyn for post-obstructive PNA   - aggressive pulmonary toilet: chest PT, duo-neb, hypersal  - Multiple discussions with patient's daughter, pulm team - decision made to pursue comfort measures and no plans for bronchoscopy/intervention, but would like to continue antibiotics for now   - Per collateral from pulm, NO stent was placed at [facility].    - c/w pain control w/ dilaudid, monitor closely.    Sepsis due to pneumonia.   ·  Plan: Patient with tachycardia, tachypnea, leukocytosis, fever, in the setting of likely post obstructive PNA  - s/p vanc and zosyn in the ED  - likely obstructive PNA from large R hilar mass obstructing the mainstem bronchus  - continue with IV zosyn, ID recs appreciated Low grade temp o/n. Continue to monitor.   - antibiotics within GO for now per dtr.     Metastatic primary lung cancer.   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 Hernandez, please see [facility] note from 7/13 for full details of discussion  - Multiple discussions with patient's daughter, pulm team - decision made to pursue comfort measures. Patient on pain medication regimen per palliative care. Patient passed away in the hospital while awaiting inpatient hospice.     Acute metabolic encephalopathy.   ·  Plan: Patient reportedly AAOx3 at baseline, now AAOx1-2  - likely from sepsis encephalopathy vs. hypercalcemia   - continue to monitor, goal is comfort   - delirium precautions.    Hypercalcemia.   ·  Plan: s/p IVF (gentle given comorbidities) S/p calcitonin and pamidronate   - Goal is comfort, will not trend labs.     Atrial tachycardia.   ·  Plan: Atrial tachycardia noted to 150s  - C/w IV lopressor 5mg q6h standing   - Can give PRN lopressor 5mg IVP as needed.     History of mechanical aortic valve replacement.   ·  Plan: Patient with hx of mechanical aortic valve replacement  - on JANTOVEN at home, per daughter she CANNOT take coumadin/warfarin. She has her own meds and it was being dispensed at [facility]. Obtained meds from [facility], now at bedside  - INR supratherapeutic at 5.77 on admission, s/p IV Vit K 10mg x 1, now subtherapeutic.   - After discussion with patient's daughter, decision was made to not continue heparin gtt or Jantoven as this requires frequent monitoring and blood draws and not in line with comfort.      Heart failure with reduced ejection fraction.   ·  Plan: Patient without documented history of HFrEF; however on GDMT per Outpt medication review  - on Farxiga, Entresto, vericiguat, metoprolol at home  - repeat TTE showing grossly normal LVSF  - Will hold meds as patient is unable to take PO.     Advanced care planning/counseling discussion.   ·  Plan: - palliative care consulted and spoke to patient's daughter with Dr. Hernandez, please see [facility] note from 7/13 for full details of discussion.   - DNR/DNI  - Multiple discussions with patient's daughter, pulm team - decision made to pursue comfort measures  - Patient accepted for hospice but transportation cancelled secondary to patient condition worsening.

## 2023-07-20 NOTE — PROGRESS NOTE ADULT - SUBJECTIVE AND OBJECTIVE BOX
Patient is a 73y old  Female who presents with a chief complaint of Respiratory distress (19 Jul 2023 14:38)      SUBJECTIVE / OVERNIGHT EVENTS: No acute events. Laying in bed, not appearing in any pain or discomfort.     MEDICATIONS  (STANDING):  albuterol/ipratropium for Nebulization 3 milliLiter(s) Nebulizer every 6 hours  glycopyrrolate Injectable 0.4 milliGRAM(s) IV Push every 6 hours  HYDROmorphone  Injectable 0.5 milliGRAM(s) IV Push every 6 hours  levothyroxine Injectable 20 MICROGram(s) IV Push at bedtime  metoprolol tartrate Injectable 5 milliGRAM(s) IV Push every 6 hours  pantoprazole  Injectable 40 milliGRAM(s) IV Push daily  polyethylene glycol 3350 17 Gram(s) Oral daily  sodium chloride 3%  Inhalation 4 milliLiter(s) Inhalation every 6 hours    MEDICATIONS  (PRN):  acetaminophen     Tablet .. 650 milliGRAM(s) Oral every 6 hours PRN Temp greater or equal to 38C (100.4F), Mild Pain (1 - 3)  guaiFENesin Oral Liquid (Sugar-Free) 100 milliGRAM(s) Oral every 6 hours PRN Cough  HYDROmorphone  Injectable 0.5 milliGRAM(s) IV Push every 3 hours PRN Severe Pain (7 - 10)  HYDROmorphone  Injectable 0.5 milliGRAM(s) IV Push every 3 hours PRN dyspnea, respiratory distress  hydrOXYzine hydrochloride Injectable 25 milliGRAM(s) IntraMuscular every 6 hours PRN Anxiety  LORazepam   Injectable 0.25 milliGRAM(s) IV Push every 6 hours PRN Anxiety    CAPILLARY BLOOD GLUCOSE    I&O's Summary    PHYSICAL EXAM:  Vital Signs Last 24 Hrs  T(C): 37.6 (20 Jul 2023 10:37), Max: 38.1 (19 Jul 2023 18:22)  T(F): 99.7 (20 Jul 2023 10:37), Max: 100.6 (19 Jul 2023 18:22)  HR: 118 (20 Jul 2023 10:37) (68 - 130)  BP: 56/30 (20 Jul 2023 10:37) (56/30 - 82/40)  BP(mean): --  RR: 20 (20 Jul 2023 10:37) (20 - 22)  SpO2: 94% (20 Jul 2023 10:37) (90% - 99%)    Parameters below as of 20 Jul 2023 10:37  Patient On (Oxygen Delivery Method): nasal cannula  O2 Flow (L/min): 3    CONSTITUTIONAL: NAD, laying in bed  EYES: conjunctiva and sclera clear  ENMT: moist oral mucosa  RESPIRATORY: course breath sounds, no wheezing  CARDIOVASCULAR: Regular rate and rhythm, normal S1 and S2, No lower extremity edema  ABDOMEN: soft, ND, +BS  PSYCH: calm  SKIN: No rashes    LABS, RADIOLOGY & ADDITIONAL TESTS: Reviewed    COORDINATION OF CARE:  Care Discussed with Consultants/Other Providers [Y- Medicine ACP, CM/SW]

## 2023-07-20 NOTE — PROGRESS NOTE ADULT - PROBLEM SELECTOR PROBLEM 10
Prophylactic measure
Prolonged QT interval
Prophylactic measure
Hyperlipidemia
Prophylactic measure

## 2023-07-20 NOTE — PROGRESS NOTE ADULT - PROBLEM SELECTOR PLAN 10
DVT ppx: No AC, SCDs  Dispo: awaiting inpatient hospice    Updated patient's daughter Zhanna ([phone]) 7/20/23

## 2023-07-20 NOTE — PROGRESS NOTE ADULT - PROBLEM SELECTOR PLAN 8
Patient without documented history of HFrEF; however on GDMT per Outpt medication review  - on Farxiga, Entresto, vericiguat, metoprolol at home  - repeat TTE showing grossly normal LVSF  - Will hold meds as patient is unable to take PO
Patient without documented history of HFrEF; however on GDMT per Outpt medication review  - on Farxiga, Entresto, vericiguat, metoprolol at home  - repeat TTE showing grossly normal LVSF  - Can restart meds once taking PO
Patient without documented history of HFrEF; however on GDMT per Outpt medication review  - on Farxiga, Entresto, vericiguat, metoprolol at home  - repeat TTE showing grossly normal LVSF  - Will hold meds as patient is unable to take PO
Patient without documented history of HFrEF; however on GDMT per Outpt medication review  - on Farxiga, Entresto, vericiguat, metoprolol at home  - repeat TTE showing grossly normal LVSF  - Will hold meds as patient is unable to take PO
Patient on Ezetimibe and rosuvastatin at home    Plan:  > continue with atorvastatin while admitted
Patient without documented history of HFrEF; however on GDMT per Outpt medication review  - on Farxiga, Entresto, vericiguat, metoprolol at home  - repeat TTE showing grossly normal LVSF  - Will hold meds as patient is unable to take PO

## 2023-07-20 NOTE — PROGRESS NOTE ADULT - PROBLEM SELECTOR PROBLEM 3
Metastatic primary lung cancer
Acute respiratory failure with hypoxia
Metastatic primary lung cancer
Acute respiratory failure with hypoxia
Metastatic primary lung cancer
Acute respiratory failure with hypoxia
Metastatic primary lung cancer

## 2023-07-20 NOTE — PROGRESS NOTE ADULT - TIME BILLING
review of laboratory data, radiology results, discussion with primary team\patient, and monitoring for potential decompensation. Interventions were performed as documented above.
Review of laboratory data, radiology results, consultant recommendations, EMR documentation, discussion with patient/advanced care providers and interdisciplinary staff.
Time-based billing (NON-critical care).     70 minutes spent on total encounter; more than 50% of the visit was spent counseling and / or coordinating care by the attending physician.  The necessity of the time spent during the encounter on this date of service was due to:     review of laboratory data, radiology results, consultants' recommendations, documentation in [place], discussion with patient/caregivers/housestaff and interdisciplinary staff (such as , social workers, etc). Interventions were performed as documented above.
Time-based billing (NON-critical care).     50 minutes spent on total encounter; more than 50% of the visit was spent counseling and / or coordinating care by the attending physician.  The necessity of the time spent during the encounter on this date of service was due to:     review of laboratory data, radiology results, consultants' recommendations, documentation in [place], discussion with patient/caregivers/ACP and interdisciplinary staff (such as , social workers, etc). Interventions were performed as documented above.

## 2023-07-20 NOTE — PROGRESS NOTE ADULT - PROBLEM SELECTOR PROBLEM 1
Acute respiratory failure with hypoxia
Sepsis due to pneumonia
Metastatic primary lung cancer
Acute respiratory failure with hypoxia
Metastatic primary lung cancer
Acute respiratory failure with hypoxia
Acute respiratory failure with hypoxia
Metastatic primary lung cancer
Acute respiratory failure with hypoxia

## 2023-07-20 NOTE — PROGRESS NOTE ADULT - ASSESSMENT
73F with newly diagnosed metastatic lung cancer, AVR (on Jantoven), T2DM, COPD, HTN, hypothyroidism, HFpEF and depression who presents from [place] for respiratory distress and hypoxia, found to have sepsis and acute hypoxemic respiratory failure likely 2/2 from post-obstructive pneumonia for R lung mass. Now goal of comfort.

## 2023-07-20 NOTE — PROGRESS NOTE ADULT - NSPROGADDITIONALINFOA_GEN_ALL_CORE
Communication: Spoke with patient's daughter Zhanna ([phone]) with Dr. Hernandez and Vero TIPTON from palliative care. Please see GOC note from for full details of discussion.   > 33 minutes spent on GOC discussion over the phone. Patient's daughter unable to come to the hospital due to medical condition.
Communication: Spoke with patient's daughter Zhanna ([phone]) with Dr. Hernandez and Vero TIPTON from palliative care. Please see GOC note from for full details of discussion.   > 33 minutes spent on GOC discussion over the phone. Patient's daughter unable to come to the hospital due to medical condition.
Awaiting inpt hospice placement
Addendum: Patient seen and examined this morning.   Later, informed patient passed away and was pronounced at 1129. Daughter updated.

## 2023-07-20 NOTE — PROGRESS NOTE ADULT - PROBLEM SELECTOR PLAN 7
Pt able to ambulate to [facility] independently Patient with hx of mechanical aortic valve replacement  - on JANTOVEN at home, per daughter she CANNOT take coumadin/warfarin. She has her own meds and it was being dispensed at \[facility]". Obtained meds from \[facility]\"", now at bedside  - INR supratherapeutic at 5.77 on admission, s/p IV Vit K 10mg x 1, now subtherapeutic.   - After discussion with patient's daughter, decision was made to not continue heparin gtt or Jantoven as this requires frequent monitoring and blood draws and not in line with comfort.

## 2023-07-20 NOTE — PROGRESS NOTE ADULT - PROBLEM SELECTOR PROBLEM 9
Advanced care planning/counseling discussion
Type 2 diabetes mellitus
Advanced care planning/counseling discussion
Hypertension
Advanced care planning/counseling discussion

## 2023-07-20 NOTE — PROGRESS NOTE ADULT - PROVIDER SPECIALTY LIST ADULT
Heme/Onc
Intervent Pulmonology
Heme/Onc
Infectious Disease
Palliative Care
Palliative Care
Hospitalist
Palliative Care
Hospitalist

## 2023-07-20 NOTE — PROGRESS NOTE ADULT - PROBLEM SELECTOR PLAN 9
- palliative care consulted and spoke to patient's daughter with Dr. Hernandez, please see [facility] note from 7/13 for full details of discussion.   - DNR/DNI  - Multiple discussions with patient's daughter, pulm team - decision made to pursue comfort measures  - f/u hospice
- palliative care consulted and spoke to patient's daughter with Dr. Hernandez, please see [facility] note from 7/13 for full details of discussion.   - DNR/DNI  - Multiple discussions with patient's daughter, pulm team - decision made to pursue comfort measures  - f/u hospice
- palliative care consulted and spoke to patient's daughter with Dr. Hernandez, please see [facility] note from 7/13 for full details of discussion.   - DNR/DNI  - Multiple discussions with patient's daughter, pulm team - decision made to pursue comfort measures  - Consider inpatient hospice, will discuss with Pall Care tomorrow
- palliative care consulted and spoke to patient's daughter with Dr. Hernandez, please see [facility] note from 7/13 for full details of discussion.   - DNR/DNI  - Multiple discussions with patient's daughter, pulm team - decision made to pursue comfort measures  - f/u hospice
Patient on Entresto, amlodipine, metoprolol    Plan:  > resume home meds
- palliative care consulted and spoke to patient's daughter with Dr. Hernandez, please see [facility] note from 7/13 for full details of discussion.   - DNR/DNI  - Multiple discussions with patient's daughter, pulm team - decision made to pursue comfort measures  - f/u hospice
Patient on orals at home: metformin 500 mg BID and Farxiga 10 mg qd  - glucose on BMP within range thus far  - monitor FS  - low dose ISS  - Hold farxiga as NPO   - can add basal/bolus if needed
- palliative care consulted and spoke to patient's daughter with Dr. Hernandez, please see [facility] note from 7/13 for full details of discussion.   - DNR/DNI  - Multiple discussions with patient's daughter, pulm team - decision made to pursue comfort measures

## 2023-07-20 NOTE — PROGRESS NOTE ADULT - REASON FOR ADMISSION
Respiratory distress

## 2023-07-20 NOTE — PROGRESS NOTE ADULT - PROBLEM SELECTOR PROBLEM 7
History of mechanical aortic valve replacement
History of mechanical aortic valve replacement
Type 2 diabetes mellitus
Encounter for palliative care
History of mechanical aortic valve replacement
Encounter for palliative care
History of mechanical aortic valve replacement

## 2023-07-20 NOTE — PROGRESS NOTE ADULT - PROBLEM SELECTOR PROBLEM 4
Acute metabolic encephalopathy
Acute pain
Acute pain
Acute metabolic encephalopathy
Acute pain
Acute metabolic encephalopathy

## 2023-07-20 NOTE — PROGRESS NOTE ADULT - PROBLEM SELECTOR PLAN 3
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   - palliative care consulted and spoke to patient's daughter with Dr. Hernandez, please see [facility] note from 7/13 for full details of discussion.   - Multiple discussions with patient's daughter, pulm team - decision made to pursue comfort measures  - Increase Dilaudid 0.5mg to q3h PRN severe pain and add standing Dilaudid 0.2mg q6h ATC  - Started on ativan but daughter reports that patient has adverse effects and patient sometimes becomes more agitated, states hydroxyzine works well for her.   - follow up regarding possibility of inpatient hospice, appreciate DANUTA

## 2023-08-23 RX ORDER — METFORMIN HYDROCHLORIDE 850 MG/1
1 TABLET ORAL
Refills: 0 | DISCHARGE

## 2023-08-23 RX ORDER — FENTANYL CITRATE 50 UG/ML
1 INJECTION INTRAVENOUS
Refills: 0 | DISCHARGE

## 2023-08-23 RX ORDER — WARFARIN SODIUM 2.5 MG/1
1 TABLET ORAL
Refills: 0 | DISCHARGE

## 2023-08-23 RX ORDER — LEVOTHYROXINE SODIUM 125 MCG
1 TABLET ORAL
Refills: 0 | DISCHARGE

## 2023-08-23 RX ORDER — TIZANIDINE 4 MG/1
1 TABLET ORAL
Refills: 0 | DISCHARGE

## 2023-08-23 RX ORDER — MAGNESIUM OXIDE 400 MG ORAL TABLET 241.3 MG
1 TABLET ORAL
Refills: 0 | DISCHARGE

## 2023-08-23 RX ORDER — LOSARTAN POTASSIUM 100 MG/1
1 TABLET, FILM COATED ORAL
Refills: 0 | DISCHARGE

## 2023-08-23 RX ORDER — LEVALBUTEROL 1.25 MG/.5ML
3 SOLUTION, CONCENTRATE RESPIRATORY (INHALATION)
Refills: 0 | DISCHARGE

## 2023-08-23 RX ORDER — GABAPENTIN 400 MG/1
1 CAPSULE ORAL
Refills: 0 | DISCHARGE

## 2023-08-23 RX ORDER — DRONABINOL 2.5 MG
1 CAPSULE ORAL
Refills: 0 | DISCHARGE

## 2023-08-23 RX ORDER — METOPROLOL TARTRATE 50 MG
1 TABLET ORAL
Refills: 0 | DISCHARGE

## 2023-08-23 RX ORDER — SACUBITRIL AND VALSARTAN 24; 26 MG/1; MG/1
1 TABLET, FILM COATED ORAL
Refills: 0 | DISCHARGE

## 2023-08-23 RX ORDER — PANTOPRAZOLE SODIUM 20 MG/1
1 TABLET, DELAYED RELEASE ORAL
Refills: 0 | DISCHARGE

## 2023-08-23 RX ORDER — HYDROCHLOROTHIAZIDE 25 MG
1 TABLET ORAL
Refills: 0 | DISCHARGE

## 2023-08-23 RX ORDER — ROSUVASTATIN CALCIUM 5 MG/1
1 TABLET ORAL
Refills: 0 | DISCHARGE

## 2023-08-23 RX ORDER — AMLODIPINE BESYLATE 2.5 MG/1
1 TABLET ORAL
Refills: 0 | DISCHARGE

## 2023-08-23 RX ORDER — POLYETHYLENE GLYCOL 3350 17 G/17G
17 POWDER, FOR SOLUTION ORAL
Refills: 0 | DISCHARGE

## 2023-08-23 RX ORDER — HYDROMORPHONE HYDROCHLORIDE 2 MG/ML
2 INJECTION INTRAMUSCULAR; INTRAVENOUS; SUBCUTANEOUS
Refills: 0 | DISCHARGE

## 2023-08-23 RX ORDER — DAPAGLIFLOZIN 10 MG/1
1 TABLET, FILM COATED ORAL
Refills: 0 | DISCHARGE

## 2023-08-23 RX ORDER — VERICIGUAT 2.5 MG/1
1 TABLET, FILM COATED ORAL
Refills: 0 | DISCHARGE

## 2023-08-23 RX ORDER — EZETIMIBE 10 MG/1
1 TABLET ORAL
Refills: 0 | DISCHARGE

## 2024-04-12 NOTE — ED ADULT NURSE NOTE - CHIEF COMPLAINT QUOTE
(E4) spontaneous Pt reporting to the ED from [facility] as a notification for AMS and respiratory distress. Pt brought directly back to [facility]

## 2025-01-11 NOTE — PROGRESS NOTE ADULT - PROBLEM SELECTOR PLAN 4
Patient reportedly AAOx3 at baseline, now AAOx1-2  - likely from sepsis encephalopathy vs. hypercalcemia   - continue to monitor, goal is comfort   - delirium precautions Applied

## (undated) DEVICE — GLV 8.5 PROTEXIS (WHITE)

## (undated) DEVICE — VENODYNE/SCD SLEEVE CALF MEDIUM

## (undated) DEVICE — SOL IRR POUR H2O 500ML

## (undated) DEVICE — PACK BRONCHOSCOPY

## (undated) DEVICE — DRAPE TOWEL BLUE 17" X 24"

## (undated) DEVICE — FORCEP BIOPSY BRONCHOSCOPE DISP

## (undated) DEVICE — SOL IRR NS 0.9% 250ML

## (undated) DEVICE — TRAP SPECIMEN SPUTUM 40CC

## (undated) DEVICE — LABELS BLANK W PEN

## (undated) DEVICE — VALVE SUCTION EVIS 160/200/240

## (undated) DEVICE — FORCEP BIOPSY 1.8MM JAW X 100CM DISP

## (undated) DEVICE — VALVE BIOPSY BRONCHOVIDEOSCOPE

## (undated) DEVICE — TUBING CANNULA SALTER LABS NASAL ADULT 7FT

## (undated) DEVICE — SUTURE REMOVAL KIT

## (undated) DEVICE — ADAPTER FIBEROPTIC BRONCHOSCOPE DUAL AXIS SWIVEL

## (undated) DEVICE — SYR LUER SLIP TIP 30CC

## (undated) DEVICE — GLV 7 PROTEXIS (WHITE)

## (undated) DEVICE — DRSG TAPE TRANSPORE 1"

## (undated) DEVICE — SOL ANTI FOG

## (undated) DEVICE — DRAPE 3/4 SHEET 52X76"

## (undated) DEVICE — MASK SURGICAL WITH EYESHIELD ANTIFOG (ORANGE)